# Patient Record
Sex: FEMALE | Employment: UNEMPLOYED | URBAN - METROPOLITAN AREA
[De-identification: names, ages, dates, MRNs, and addresses within clinical notes are randomized per-mention and may not be internally consistent; named-entity substitution may affect disease eponyms.]

---

## 2017-02-06 ENCOUNTER — OFFICE VISIT (OUTPATIENT)
Dept: FAMILY MEDICINE | Facility: CLINIC | Age: 62
End: 2017-02-06
Payer: COMMERCIAL

## 2017-02-06 VITALS
WEIGHT: 168 LBS | HEART RATE: 70 BPM | OXYGEN SATURATION: 98 % | TEMPERATURE: 97.6 F | DIASTOLIC BLOOD PRESSURE: 79 MMHG | BODY MASS INDEX: 29.77 KG/M2 | SYSTOLIC BLOOD PRESSURE: 137 MMHG | HEIGHT: 63 IN

## 2017-02-06 DIAGNOSIS — Z12.11 SCREEN FOR COLON CANCER: ICD-10-CM

## 2017-02-06 DIAGNOSIS — M17.0 PRIMARY OSTEOARTHRITIS OF BOTH KNEES: ICD-10-CM

## 2017-02-06 DIAGNOSIS — I10 ESSENTIAL HYPERTENSION WITH GOAL BLOOD PRESSURE LESS THAN 140/90: Primary | ICD-10-CM

## 2017-02-06 DIAGNOSIS — R53.83 FATIGUE, UNSPECIFIED TYPE: ICD-10-CM

## 2017-02-06 DIAGNOSIS — Z11.59 NEED FOR HEPATITIS C SCREENING TEST: ICD-10-CM

## 2017-02-06 LAB
ALBUMIN SERPL-MCNC: 4 G/DL (ref 3.4–5)
ALP SERPL-CCNC: 68 U/L (ref 40–150)
ALT SERPL W P-5'-P-CCNC: 30 U/L (ref 0–50)
ANION GAP SERPL CALCULATED.3IONS-SCNC: 11 MMOL/L (ref 3–14)
AST SERPL W P-5'-P-CCNC: 29 U/L (ref 0–45)
BASOPHILS # BLD AUTO: 0 10E9/L (ref 0–0.2)
BASOPHILS NFR BLD AUTO: 0.3 %
BILIRUB SERPL-MCNC: 0.5 MG/DL (ref 0.2–1.3)
BUN SERPL-MCNC: 12 MG/DL (ref 7–30)
CALCIUM SERPL-MCNC: 9.6 MG/DL (ref 8.5–10.1)
CHLORIDE SERPL-SCNC: 101 MMOL/L (ref 94–109)
CO2 SERPL-SCNC: 27 MMOL/L (ref 20–32)
CREAT SERPL-MCNC: 0.66 MG/DL (ref 0.52–1.04)
DIFFERENTIAL METHOD BLD: NORMAL
EOSINOPHIL # BLD AUTO: 0.2 10E9/L (ref 0–0.7)
EOSINOPHIL NFR BLD AUTO: 1.6 %
ERYTHROCYTE [DISTWIDTH] IN BLOOD BY AUTOMATED COUNT: 14.9 % (ref 10–15)
GFR SERPL CREATININE-BSD FRML MDRD: NORMAL ML/MIN/1.7M2
GLUCOSE SERPL-MCNC: 88 MG/DL (ref 70–99)
HBA1C MFR BLD: 5.7 % (ref 4.3–6)
HCT VFR BLD AUTO: 42 % (ref 35–47)
HGB BLD-MCNC: 13.8 G/DL (ref 11.7–15.7)
LYMPHOCYTES # BLD AUTO: 3.1 10E9/L (ref 0.8–5.3)
LYMPHOCYTES NFR BLD AUTO: 33.8 %
MCH RBC QN AUTO: 28.3 PG (ref 26.5–33)
MCHC RBC AUTO-ENTMCNC: 32.9 G/DL (ref 31.5–36.5)
MCV RBC AUTO: 86 FL (ref 78–100)
MONOCYTES # BLD AUTO: 0.9 10E9/L (ref 0–1.3)
MONOCYTES NFR BLD AUTO: 9.7 %
NEUTROPHILS # BLD AUTO: 5 10E9/L (ref 1.6–8.3)
NEUTROPHILS NFR BLD AUTO: 54.6 %
PLATELET # BLD AUTO: 360 10E9/L (ref 150–450)
POTASSIUM SERPL-SCNC: 3.5 MMOL/L (ref 3.4–5.3)
PROT SERPL-MCNC: 7.9 G/DL (ref 6.8–8.8)
RBC # BLD AUTO: 4.87 10E12/L (ref 3.8–5.2)
SODIUM SERPL-SCNC: 139 MMOL/L (ref 133–144)
TSH SERPL DL<=0.005 MIU/L-ACNC: 1.35 MU/L (ref 0.4–4)
WBC # BLD AUTO: 9.1 10E9/L (ref 4–11)

## 2017-02-06 PROCEDURE — 83036 HEMOGLOBIN GLYCOSYLATED A1C: CPT | Performed by: FAMILY MEDICINE

## 2017-02-06 PROCEDURE — 82306 VITAMIN D 25 HYDROXY: CPT | Performed by: FAMILY MEDICINE

## 2017-02-06 PROCEDURE — 86803 HEPATITIS C AB TEST: CPT | Performed by: FAMILY MEDICINE

## 2017-02-06 PROCEDURE — 36415 COLL VENOUS BLD VENIPUNCTURE: CPT | Performed by: FAMILY MEDICINE

## 2017-02-06 PROCEDURE — 80050 GENERAL HEALTH PANEL: CPT | Performed by: FAMILY MEDICINE

## 2017-02-06 PROCEDURE — 99214 OFFICE O/P EST MOD 30 MIN: CPT | Performed by: FAMILY MEDICINE

## 2017-02-06 RX ORDER — ACETAMINOPHEN 325 MG/1
325-650 TABLET ORAL EVERY 6 HOURS PRN
Qty: 100 TABLET | Refills: 3 | Status: SHIPPED | OUTPATIENT
Start: 2017-02-06 | End: 2017-11-10

## 2017-02-06 NOTE — Clinical Note
[facility]   [address] [place]  [address]  [phone]                                   February 8, 2017    Sanjuana Salamanca  [address]    [facility] [place] [address]        Dear Sanjuana,    Your recent labs look normal.     Results for orders placed or performed in visit on 02/06/17   Hepatitis C Screen Reflex to HCV RNA Quant and Genotype   Result Value Ref Range    Hepatitis C Antibody  NR     Nonreactive   Assay performance characteristics have not been established for newborns,   infants, and children     CBC with platelets and differential   Result Value Ref Range    WBC 9.1 4.0 - 11.0 10e9/L    RBC Count 4.87 3.8 - 5.2 10e12/L    Hemoglobin 13.8 11.7 - 15.7 g/dL    Hematocrit 42.0 35.0 - 47.0 %    MCV 86 78 - 100 fl    MCH 28.3 26.5 - 33.0 pg    MCHC 32.9 31.5 - 36.5 g/dL    RDW 14.9 10.0 - 15.0 %    Platelet Count 360 150 - 450 10e9/L    Diff Method Automated Method     % Neutrophils 54.6 %    % Lymphocytes 33.8 %    % Monocytes 9.7 %    % Eosinophils 1.6 %    % Basophils 0.3 %    Absolute Neutrophil 5.0 1.6 - 8.3 10e9/L    Absolute Lymphocytes 3.1 0.8 - 5.3 10e9/L    Absolute Monocytes 0.9 0.0 - 1.3 10e9/L    Absolute Eosinophils 0.2 0.0 - 0.7 10e9/L    Absolute Basophils 0.0 0.0 - 0.2 10e9/L   Comprehensive metabolic panel (BMP + Alb, Alk Phos, ALT, AST, Total. Bili, TP)   Result Value Ref Range    Sodium 139 133 - 144 mmol/L    Potassium 3.5 3.4 - 5.3 mmol/L    Chloride 101 94 - 109 mmol/L    Carbon Dioxide 27 20 - 32 mmol/L    Anion Gap 11 3 - 14 mmol/L    Glucose 88 70 - 99 mg/dL    Urea Nitrogen 12 7 - 30 mg/dL    Creatinine 0.66 0.52 - 1.04 mg/dL    GFR Estimate >90  Non  GFR Calc   >60 mL/min/1.7m2    GFR Estimate If Black >90   GFR Calc   >60 mL/min/1.7m2    Calcium 9.6 8.5 - 10.1 mg/dL    Bilirubin Total 0.5 0.2 - 1.3 mg/dL    Albumin 4.0 3.4 - 5.0 g/dL    Protein Total 7.9 6.8 - 8.8 g/dL    Alkaline Phosphatase 68  40 - 150 U/L    ALT 30 0 - 50 U/L    AST 29 0 - 45 U/L   TSH with free T4 reflex   Result Value Ref Range    TSH 1.35 0.40 - 4.00 mU/L   Hemoglobin A1c   Result Value Ref Range    Hemoglobin A1C 5.7 4.3 - 6.0 %   Vitamin D Deficiency   Result Value Ref Range    Vitamin D Deficiency screening 27 20 - 75 ug/L       If you have any questions please call the clinic at [phone]    Sincerely,    Sweetie Gao MD  bmd

## 2017-02-06 NOTE — NURSING NOTE
"Chief Complaint   Patient presents with     Hypertension     and potassium, and very tired        Initial /79 mmHg  Pulse 70  Temp(Src) 97.6  F (36.4  C) (Oral)  Ht 5' 3.25\" (1.607 m)  Wt 168 lb (76.204 kg)  BMI 29.51 kg/m2  SpO2 98% Estimated body mass index is 29.51 kg/(m^2) as calculated from the following:    Height as of this encounter: 5' 3.25\" (1.607 m).    Weight as of this encounter: 168 lb (76.204 kg).  Medication Reconciliation: complete  Fede Castellanos MA    "

## 2017-02-06 NOTE — PROGRESS NOTES
"  SUBJECTIVE:                                                    Sanjuana Salamanca is a 62 year old female who presents to clinic today for the following health issues:    Hypertension Follow-up      Outpatient blood pressures are being checked at home.     Low Salt Diet: no added salt       Amount of exercise or physical activity: None    Problems taking medications regularly: No    Medication side effects: none  Diet: low salt and low fat/cholesterol    Check potassium and patient is very tired.    She could not get her labs done during her last check.     Problem list and histories reviewed & adjusted, as indicated.  Additional history: as documented    BP Readings from Last 3 Encounters:   02/06/17 137/79   10/07/16 141/86   08/26/16 105/74    Wt Readings from Last 3 Encounters:   02/06/17 168 lb (76.204 kg)   10/07/16 169 lb (76.658 kg)   08/26/16 169 lb (76.658 kg)             Problem list, Medication list, Allergies, and Medical/Social/Surgical histories reviewed in EPIC and updated as appropriate.    ROS:  Constitutional, HEENT, cardiovascular, pulmonary, gi and gu systems are negative, except as otherwise noted.    OBJECTIVE:                                                    /79 mmHg  Pulse 70  Temp(Src) 97.6  F (36.4  C) (Oral)  Ht 5' 3.25\" (1.607 m)  Wt 168 lb (76.204 kg)  BMI 29.51 kg/m2  SpO2 98%  Body mass index is 29.51 kg/(m^2).  GENERAL: healthy, alert and no distress  HENT: ear canals and TM's normal, nose and mouth without ulcers or lesions  NECK: no adenopathy, no asymmetry, masses, or scars and thyroid normal to palpation  RESP: lungs clear to auscultation - no rales, rhonchi or wheezes  CV: regular rate and rhythm, normal S1 S2, no S3 or S4, no murmur, click or rub, no peripheral edema and peripheral pulses strong  ABDOMEN: soft, nontender, no hepatosplenomegaly, no masses and bowel sounds normal  MS: no gross musculoskeletal defects noted, no edema  NEURO: Normal strength and tone, " mentation intact and speech normal       ASSESSMENT/PLAN:                                                        ICD-10-CM    1. Essential hypertension with goal blood pressure less than 140/90 I10    2. Screen for colon cancer Z12.11 Fecal colorectal cancer screen (FIT)   3. Need for hepatitis C screening test Z11.59 Hepatitis C Screen Reflex to HCV RNA Quant and Genotype   4. Fatigue, unspecified type R53.83 CBC with platelets and differential     Comprehensive metabolic panel (BMP + Alb, Alk Phos, ALT, AST, Total. Bili, TP)     TSH with free T4 reflex     Hemoglobin A1c     Vitamin D Deficiency   5. Primary osteoarthritis of both knees M17.0 acetaminophen (TYLENOL) 325 MG tablet     Pt's exam is reassuring. Labs ordered for fatigue. Encouraged regular exercise, healthy diet and vitamin D daily.     Pt's BP is at goal today. Continue with same meds for HTN w/p change. F/u in 6 months for hypertension.       Sweetie Gao MD  [facility]

## 2017-02-07 LAB
DEPRECATED CALCIDIOL+CALCIFEROL SERPL-MC: 27 UG/L (ref 20–75)
HCV AB SERPL QL IA: NORMAL

## 2017-02-08 NOTE — PROGRESS NOTES
Quick Note:    Dear Sanjuana Salamanca ,     Your recent labs look normal.     Sweetie Gao MD.   Family Physician.  [facility].         ______

## 2017-02-10 DIAGNOSIS — Z12.11 SCREEN FOR COLON CANCER: ICD-10-CM

## 2017-02-10 PROCEDURE — 82274 ASSAY TEST FOR BLOOD FECAL: CPT | Performed by: FAMILY MEDICINE

## 2017-02-12 LAB — HEMOCCULT STL QL IA: NEGATIVE

## 2017-02-13 NOTE — PROGRESS NOTES
Dear Sanjuana Salamanca,     Your recent stool test is NEGATIVE.     Sweetie Gao MD.   Family Physician.  [facility].

## 2017-04-06 ENCOUNTER — TELEPHONE (OUTPATIENT)
Dept: FAMILY MEDICINE | Facility: CLINIC | Age: 62
End: 2017-04-06

## 2017-04-06 NOTE — TELEPHONE ENCOUNTER
Panel Management Review      Patient has the following on her problem list:     Hypertension   Last three blood pressure readings:  BP Readings from Last 3 Encounters:   02/06/17 137/79   10/07/16 141/86   08/26/16 105/74     Blood pressure: Passed    HTN Guidelines:  Age 18-59 BP range:  Less than 140/90  Age 60-85 with Diabetes:  Less than 140/90  Age 60-85 without Diabetes:  less than 150/90      Composite cancer screening  Chart review shows that this patient is due/due soon for the following Pap Smear and Mammogram  Summary:    Patient is due/failing the following:   MAMMOGRAM and PAP    Action needed:     PLAN:  Due for pap smear and mammogram,but declined   Follow up needed: Do not contact patient    Type of Follow up: none  Per PM on 10/1/15, 8/11/16 PM patient declined to complete pap    Type of outreach:    None    Questions for provider review:    None                                                                                                                                    Mari Allan [facility]        Chart routed to Care Team .

## 2017-04-28 ENCOUNTER — OFFICE VISIT (OUTPATIENT)
Dept: FAMILY MEDICINE | Facility: CLINIC | Age: 62
End: 2017-04-28
Payer: COMMERCIAL

## 2017-04-28 VITALS
DIASTOLIC BLOOD PRESSURE: 84 MMHG | SYSTOLIC BLOOD PRESSURE: 121 MMHG | BODY MASS INDEX: 30.4 KG/M2 | TEMPERATURE: 98.2 F | WEIGHT: 173 LBS | OXYGEN SATURATION: 98 % | HEART RATE: 58 BPM

## 2017-04-28 DIAGNOSIS — R05.9 COUGH: Primary | ICD-10-CM

## 2017-04-28 DIAGNOSIS — I10 ESSENTIAL HYPERTENSION WITH GOAL BLOOD PRESSURE LESS THAN 140/90: ICD-10-CM

## 2017-04-28 DIAGNOSIS — E87.6 HYPOKALEMIA: ICD-10-CM

## 2017-04-28 DIAGNOSIS — I10 HYPERTENSION GOAL BP (BLOOD PRESSURE) < 140/90: ICD-10-CM

## 2017-04-28 PROCEDURE — 99213 OFFICE O/P EST LOW 20 MIN: CPT | Performed by: FAMILY MEDICINE

## 2017-04-28 RX ORDER — METOPROLOL TARTRATE 25 MG/1
12.5 TABLET, FILM COATED ORAL 2 TIMES DAILY
Qty: 90 TABLET | Refills: 1 | Status: SHIPPED | OUTPATIENT
Start: 2017-04-28 | End: 2017-11-10

## 2017-04-28 RX ORDER — GUAIFENESIN/DEXTROMETHORPHAN 100-10MG/5
5 SYRUP ORAL EVERY 4 HOURS PRN
Qty: 560 ML | Refills: 0 | Status: SHIPPED | OUTPATIENT
Start: 2017-04-28 | End: 2017-05-17

## 2017-04-28 NOTE — MR AVS SNAPSHOT
"              After Visit Summary   2017    Sanjuana Salamanca    MRN: [id number]           Patient Information     Date Of Birth          1955        Visit Information        Provider Department      2017 1:20 PM Sweetie Gao MD [facility]        Today's Diagnoses     Cough    -  1    Essential hypertension with goal blood pressure less than 140/90        Hypokalemia        Hypertension goal BP (blood pressure) < 140/90           Follow-ups after your visit        Who to contact     If you have questions or need follow up information about today's clinic visit or your schedule please contact [facility] directly at [phone].  Normal or non-critical lab and imaging results will be communicated to you by MyChart, letter or phone within 4 business days after the clinic has received the results. If you do not hear from us within 7 days, please contact the clinic through MyChart or phone. If you have a critical or abnormal lab result, we will notify you by phone as soon as possible.  Submit refill requests through [employer] or call your pharmacy and they will forward the refill request to us. Please allow 3 business days for your refill to be completed.          Additional Information About Your Visit        MyChart Information     [employer] lets you send messages to your doctor, view your test results, renew your prescriptions, schedule appointments and more. To sign up, go to [email or web address] . Click on \"Log in\" on the left side of the screen, which will take you to the Welcome page. Then click on \"Sign up Now\" on the right side of the page.     You will be asked to enter the access code listed below, as well as some personal information. Please follow the directions to create your username and password.     Your access code is: [email or web address]  Expires: 2017  2:09 PM     Your access code will  in 90 days. If you need help or a new code, " please call your [place] clinic or [phone].        Care EveryWhere ID     This is your Care EveryWhere ID. This could be used by other organizations to access your [place] medical records  [id number]        Your Vitals Were     Pulse Temperature Pulse Oximetry BMI (Body Mass Index)          58 98.2  F (36.8  C) (Oral) 98% 30.4 kg/m2         Blood Pressure from Last 3 Encounters:   04/28/17 121/84   02/06/17 137/79   10/07/16 141/86    Weight from Last 3 Encounters:   04/28/17 173 lb (78.5 kg)   02/06/17 168 lb (76.2 kg)   10/07/16 169 lb (76.7 kg)              Today, you had the following     No orders found for display         Today's Medication Changes          These changes are accurate as of: 4/28/17  2:09 PM.  If you have any questions, ask your nurse or doctor.               Start taking these medicines.        Dose/Directions    guaiFENesin-dextromethorphan 100-10 MG/5ML syrup   Commonly known as:  ROBITUSSIN DM   Used for:  Cough   Started by:  Sweetie Gao MD        Dose:  5 mL   Take 5 mLs by mouth every 4 hours as needed for cough   Quantity:  560 mL   Refills:  0            Where to get your medicines      These medications were sent to [facility] [address] [facility] [address] AT [facility] [address]  [address] [facility] [address]     Phone:  [phone]     guaiFENesin-dextromethorphan 100-10 MG/5ML syrup    metoprolol 25 MG tablet                Primary Care Provider Office Phone # Fax #    Sweetie Gao [phone]       [facility] [address] [facility] [address]        Thank you!     Thank you for choosing [facility]  for your care. Our goal is always to provide you with excellent care. Hearing back from our patients is one way we can continue to improve our services. Please take a few minutes to complete the written survey that you may receive in the mail after  your visit with us. Thank you!             Your Updated Medication List - Protect others around you: Learn how to safely use, store and throw away your medicines at www.disposemymeds.org.          This list is accurate as of: 4/28/17  2:09 PM.  Always use your most recent med list.                   Brand Name Dispense Instructions for use    acetaminophen 325 MG tablet    TYLENOL    100 tablet    Take 1-2 tablets (325-650 mg) by mouth every 6 hours as needed for mild pain       diclofenac 50 MG EC tablet    VOLTAREN    90 tablet    Take 1 tablet (50 mg) by mouth 3 times daily as needed for moderate pain       guaiFENesin-dextromethorphan 100-10 MG/5ML syrup    ROBITUSSIN DM    560 mL    Take 5 mLs by mouth every 4 hours as needed for cough       metoprolol 25 MG tablet    LOPRESSOR    90 tablet    Take 0.5 tablets (12.5 mg) by mouth 2 times daily       omeprazole 20 MG tablet     180 tablet    Take 2 tablets (40 mg) by mouth daily Take 30-60 minutes before a meal.       polyethylene glycol powder    MIRALAX    510 g    Take 17 g (1 capful) by mouth daily

## 2017-04-28 NOTE — NURSING NOTE
"Chief Complaint   Patient presents with     Cough     x 2 weeks      Hypertension       Initial /84  Pulse 58  Temp 98.2  F (36.8  C) (Oral)  Wt 173 lb (78.5 kg)  SpO2 98%  BMI 30.4 kg/m2 Estimated body mass index is 30.4 kg/(m^2) as calculated from the following:    Height as of 2/6/17: 5' 3.25\" (1.607 m).    Weight as of this encounter: 173 lb (78.5 kg).  Medication Reconciliation: complete  Fede Castellanos MA    "

## 2017-04-28 NOTE — PROGRESS NOTES
SUBJECTIVE:                                                    Sanjuana Salamanca is a 62 year old female who presents to clinic today for the following health issues:     Hypertension Follow-up      Outpatient blood pressures are being checked at home     Low Salt Diet: no added salt       Amount of exercise or physical activity: None    Problems taking medications regularly: No    Medication side effects: none    Diet: low salt  Doing well.       ENT Symptoms             Symptoms: cc Present Absent Comment   Fever/Chills   x    Fatigue   x    Muscle Aches   x    Eye Irritation   x    Sneezing   x    Nasal Nicholas/Drg   x    Sinus Pressure/Pain   x    Loss of smell   x    Dental pain   x    Sore Throat   x    Swollen Glands   x    Ear Pain/Fullness   x    Cough  x     Wheeze   x    Chest Pain   x    Shortness of breath   x    Rash   x    Other         Symptom duration:  2 weeks    Symptom severity:  moderate    Treatments tried:  robitussin    Contacts:  none        She was visiting her mother in [place]. 2 weeks ago she developed cough . Had azithromycin prescription from her last office visit that she filled out and completed the antibiotic course. Cough improved. She took her daughter's cough syrup just once thathelped her sleep.     Her cough is much better todat. She wants to know if she needs stronger cough medicine or not.     Problem list and histories reviewed & adjusted, as indicated.  Additional history: as documented    Patient Active Problem List   Diagnosis     Avitaminosis D     Esophageal reflux     OA (osteoarthritis) of knee     Advanced directives, counseling/discussion     Hyperlipidemia LDL goal <160     Cataracts, both eyes     Dry eyes     Hypokalemia     Prediabetes     Essential hypertension with goal blood pressure less than 140/90     History reviewed. No pertinent surgical history.    Social History   Substance Use Topics     Smoking status: Never Smoker     Smokeless tobacco: Never Used      Alcohol use No     Family History   Problem Relation Age of Onset     Hypertension Mother      Thyroid Disease Mother      DIABETES Other      CEREBROVASCULAR DISEASE Other      Glaucoma No family hx of      Macular Degeneration No family hx of      CANCER No family hx of          BP Readings from Last 3 Encounters:   04/28/17 121/84   02/06/17 137/79   10/07/16 141/86    Wt Readings from Last 3 Encounters:   04/28/17 173 lb (78.5 kg)   02/06/17 168 lb (76.2 kg)   10/07/16 169 lb (76.7 kg)                    Reviewed and updated as needed this visit by clinical staff  Tobacco  Allergies  Meds  Med Hx  Surg Hx  Fam Hx  Soc Hx      Reviewed and updated as needed this visit by Provider         ROS:  Constitutional, HEENT, cardiovascular, pulmonary, gi and gu systems are negative, except as otherwise noted.    OBJECTIVE:                                                    /84  Pulse 58  Temp 98.2  F (36.8  C) (Oral)  Wt 173 lb (78.5 kg)  SpO2 98%  BMI 30.4 kg/m2  Body mass index is 30.4 kg/(m^2).  GENERAL: healthy, alert and no distress  HENT: ear canals and TM's normal, nose and mouth without ulcers or lesions  SINUSES: no sinus tenderness.   NECK: no adenopathy, no asymmetry, masses, or scars and thyroid normal to palpation  RESP: lungs clear to auscultation - no rales, rhonchi or wheezes  CV: regular rate and rhythm, normal S1 S2, no S3 or S4, no murmur, click or rub, no peripheral edema and peripheral pulses strong  MS: no gross musculoskeletal defects noted, no edema  NEURO: Normal strength and tone, mentation intact and speech normal     ASSESSMENT/PLAN:                                                        ICD-10-CM    1. Cough R05 guaiFENesin-dextromethorphan (ROBITUSSIN DM) 100-10 MG/5ML syrup   2. Essential hypertension with goal blood pressure less than 140/90 I10    3. Hypokalemia E87.6    4. Hypertension goal BP (blood pressure) < 140/90 I10 metoprolol (LOPRESSOR) 25 MG tablet     Pt had  robitussin with codeine from her daughter's prescription. She wanted cough medicine that she can take every 6 hrs w/o making her sleepy. Rx as above.   F/u as needed.     BP at goal, continue metoprolol.     Sweetie Gao MD  [facility]

## 2017-05-04 ENCOUNTER — TELEPHONE (OUTPATIENT)
Dept: FAMILY MEDICINE | Facility: CLINIC | Age: 62
End: 2017-05-04

## 2017-05-04 NOTE — TELEPHONE ENCOUNTER
Reason for Call:  Form, our goal is to have forms completed with 72 hours, however, some forms may require a visit or additional information.    Type of letter, form or note:  medical    Who is the form from?: Patient    Where did the form come from: Patient or family brought in       What clinic location was the form placed at?: [place] ()    Where the form was placed: 's Box    What number is listed as a contact on the form?: [phone]        Additional comments: Please fax to [phone]    Call taken on 5/4/2017 at 3:49 PM by Bettie Dougherty

## 2017-05-05 NOTE — TELEPHONE ENCOUNTER
Date forms received: 05/05/2017  Form completed as much as possible by Sybil Tong.  Forms placed: in providers folder Date placed: 05/05/2017  Sybil Tong

## 2017-05-11 ENCOUNTER — TELEPHONE (OUTPATIENT)
Dept: FAMILY MEDICINE | Facility: CLINIC | Age: 62
End: 2017-05-11

## 2017-05-11 NOTE — TELEPHONE ENCOUNTER
5/11/2017    Call Regarding Preventive Health Screening Colonoscopy    Attempt 1    Message with female    Comments:             Outreach   LYLA

## 2017-05-17 ENCOUNTER — OFFICE VISIT (OUTPATIENT)
Dept: FAMILY MEDICINE | Facility: CLINIC | Age: 62
End: 2017-05-17
Payer: COMMERCIAL

## 2017-05-17 VITALS
WEIGHT: 171 LBS | HEART RATE: 118 BPM | BODY MASS INDEX: 30.3 KG/M2 | TEMPERATURE: 99.4 F | DIASTOLIC BLOOD PRESSURE: 78 MMHG | HEIGHT: 63 IN | OXYGEN SATURATION: 96 % | SYSTOLIC BLOOD PRESSURE: 138 MMHG

## 2017-05-17 DIAGNOSIS — J98.01 ACUTE BRONCHOSPASM: Primary | ICD-10-CM

## 2017-05-17 PROCEDURE — 99213 OFFICE O/P EST LOW 20 MIN: CPT | Performed by: FAMILY MEDICINE

## 2017-05-17 RX ORDER — ALBUTEROL SULFATE 90 UG/1
AEROSOL, METERED RESPIRATORY (INHALATION)
Qty: 1 INHALER | Refills: 0 | Status: SHIPPED | OUTPATIENT
Start: 2017-05-17 | End: 2017-08-25

## 2017-05-17 NOTE — PATIENT INSTRUCTIONS
[facility]    If you have any questions regarding to your visit please contact your care team:       Team Purple:   Clinic Hours Telephone Number   [address] Dr[address]   7am-7pm  Monday - Thursday   7am-5pm  Fridays  [phone]  (Appointment scheduling available 24/7)    Questions about your Visit?   Team Line:  [phone]   Urgent Care - [place] and [facility] - 11am-9pm Monday-Friday Saturday-Sunday- 9am-5pm   [place] - 5pm-9pm Monday-Friday Saturday-Sunday- 9am-5pm  [phone] - [facility]  [phone] - [place]       What options do I have for visits at the clinic other than the traditional office visit?  To expand how we care for you, many of our providers are utilizing electronic visits (e-visits) and telephone visits, when medically appropriate, for interactions with their patients rather than a visit in the clinic.   We also offer nurse visits for many medical concerns. Just like any other service, we will bill your insurance company for this type of visit based on time spent on the phone with your provider. Not all insurance companies cover these visits. Please check with your medical insurance if this type of visit is covered. You will be responsible for any charges that are not paid by your insurance.      E-visits via [employer]:  generally incur a $35.00 fee.  Telephone visits:  Time spent on the phone: *charged based on time that is spent on the phone in increments of 10 minutes. Estimated cost:   5-10 mins $30.00   11-20 mins. $59.00   21-30 mins. $85.00     Use [employer] (secure email communication and access to your chart) to send your primary care provider a message or make an appointment. Ask someone on your Team how to sign up for [employer].  For a Price Quote for your services, please call our Consumer Price Line at [phone].  As always, Thank you for trusting us with your health care needs!

## 2017-05-17 NOTE — PROGRESS NOTES
SUBJECTIVE:                                                    Sanjuana Salamanca is a 62 year old female who presents to clinic today for the following health issues:      ENT Symptoms             Symptoms: cc Present Absent Comment   Fever/Chills   x    Fatigue  x     Muscle Aches   x    Eye Irritation   x    Sneezing   x    Nasal [place]colas/Drg   x    Sinus Pressure/Pain   x    Loss of smell   x    Dental pain   x    Sore Throat   x    Swollen Glands   x    Ear Pain/Fullness   x    Cough  x     Wheeze   x    Chest Pain   x    Shortness of breath   x    Rash       Other         Symptom duration:  1 month   Symptom severity:  severe   Treatments tried:  robutussin,    Contacts:  no              Problem list and histories reviewed & adjusted, as indicated.  Additional history: as documented    Patient Active Problem List   Diagnosis     Avitaminosis D     Esophageal reflux     OA (osteoarthritis) of knee     Advanced directives, counseling/discussion     Hyperlipidemia LDL goal <160     Cataracts, both eyes     Dry eyes     Hypokalemia     Prediabetes     Essential hypertension with goal blood pressure less than 140/90     History reviewed. No pertinent surgical history.    Social History   Substance Use Topics     Smoking status: Never Smoker     Smokeless tobacco: Never Used     Alcohol use No     Family History   Problem Relation Age of Onset     Hypertension Mother      Thyroid Disease Mother      DIABETES Other      CEREBROVASCULAR DISEASE Other      Glaucoma No family hx of      Macular Degeneration No family hx of      CANCER No family hx of          Current Outpatient Prescriptions   Medication Sig Dispense Refill     albuterol (PROAIR HFA/PROVENTIL HFA/VENTOLIN HFA) 108 (90 BASE) MCG/ACT Inhaler Inhale 2 puffs every 6 hours as needed for cough 1 Inhaler 0     metoprolol (LOPRESSOR) 25 MG tablet Take 0.5 tablets (12.5 mg) by mouth 2 times daily 90 tablet 1     acetaminophen (TYLENOL) 325 MG tablet Take 1-2 tablets  "(325-650 mg) by mouth every 6 hours as needed for mild pain 100 tablet 3     diclofenac (VOLTAREN) 50 MG EC tablet Take 1 tablet (50 mg) by mouth 3 times daily as needed for moderate pain 90 tablet 1     omeprazole 20 MG tablet Take 2 tablets (40 mg) by mouth daily Take 30-60 minutes before a meal. 180 tablet 1     polyethylene glycol (MIRALAX) powder Take 17 g (1 capful) by mouth daily 510 g 1     BP Readings from Last 3 Encounters:   05/17/17 138/78   04/28/17 121/84   02/06/17 137/79    Wt Readings from Last 3 Encounters:   05/17/17 171 lb (77.6 kg)   04/28/17 173 lb (78.5 kg)   02/06/17 168 lb (76.2 kg)                  Labs reviewed in EPIC    Reviewed and updated as needed this visit by clinical staff  Tobacco  Allergies  Meds  Med Hx  Surg Hx  Fam Hx  Soc Hx      Reviewed and updated as needed this visit by Provider         ROS:  This 62 year old female is here today because she has had a cough for over a month. She was visiting in [place] and had a left over z-pack so she took that mid April. She was seen in clinic 2/28/17 for the persistae cough. She had used her daughter's robitussin AC and it seemed to help so she was given her own prescription at that time. Now the robitussin AC is gone and she is still coughing. She has never smoked, but she admits to breathing in a lot of incense on a daily basis. She says her culture believes that is healthy for her body, skin and hair. All other review of systems are negative  Personal, family, and social history reviewed with patient and revised.         OBJECTIVE:                                                    /78 (BP Location: Right arm, Patient Position: Chair, Cuff Size: Adult Large)  Pulse 118  Temp 99.4  F (37.4  C)  Ht 5' 3.25\" (1.607 m)  Wt 171 lb (77.6 kg)  SpO2 96%  BMI 30.05 kg/m2  Body mass index is 30.05 kg/(m^2).  GENERAL: healthy, alert and no distress  NECK: no adenopathy, no asymmetry, masses, or scars and thyroid normal to " palpation  RESP: lungs clear to auscultation - no rales or rhonchi, but she does have end expiratory wheezes and an occasional dry wheezy cough.   CV: regular rate and rhythm, normal S1 S2, no S3 or S4, no murmur, click or rub, no peripheral edema   MS: no gross musculoskeletal defects noted, no edema    Diagnostic Test Results:  none      ASSESSMENT/PLAN:                                                             1. Acute bronchospasm  As above, she must stop exposing herself to the incense which is irritating to her lungs   - albuterol (PROAIR HFA/PROVENTIL HFA/VENTOLIN HFA) 108 (90 BASE) MCG/ACT Inhaler; Inhale 2 puffs every 6 hours as needed for cough  Dispense: 1 Inhaler; Refill: 0  No need for refill of robitussin AC  Return to clinic if no improvement     GINNY GRECO MD  [facility]

## 2017-05-17 NOTE — MR AVS SNAPSHOT
After Visit Summary   5/17/2017    Sanjuana Salamanca    MRN: [id number]           Patient Information     Date Of Birth          1955        Visit Information        Provider Department      5/17/2017 2:00 PM Ladonna Cohen MD [facility]        Today's Diagnoses     Acute bronchospasm    -  1      Care Instructions    [facility] ([facility]    If you have any questions regarding to your visit please contact your care team:       Team Purple:   Clinic Hours Telephone Number   [address] Dr.   7am-7pm  Monday - Thursday   7am-5pm  Fridays  [phone]  (Appointment scheduling available 24/7)    Questions about your Visit?   Team Line:  [phone]   Urgent Care - [place] [facility] - 11am-9pm Monday-Friday Saturday-Sunday- 9am-5pm   [place] - 5pm-9pm Monday-Friday Saturday-Sunday- 9am-5pm  [phone] - [place]   [phone] - [place]       What options do I have for visits at the clinic other than the traditional office visit?  To expand how we care for you, many of our providers are utilizing electronic visits (e-visits) and telephone visits, when medically appropriate, for interactions with their patients rather than a visit in the clinic.   We also offer nurse visits for many medical concerns. Just like any other service, we will bill your insurance company for this type of visit based on time spent on the phone with your provider. Not all insurance companies cover these visits. Please check with your medical insurance if this type of visit is covered. You will be responsible for any charges that are not paid by your insurance.      E-visits via [employer]:  generally incur a $35.00 fee.  Telephone visits:  Time spent on the phone: *charged based on time that is spent on the phone in increments of 10 minutes. Estimated cost:   5-10 mins $30.00   11-20 mins. $59.00   21-30 mins. $85.00  "    Use [employer] (secure email communication and access to your chart) to send your primary care provider a message or make an appointment. Ask someone on your Team how to sign up for [employer].  For a Price Quote for your services, please call our Consumer Price Line at [phone].  As always, Thank you for trusting us with your health care needs!            Follow-ups after your visit        Who to contact     If you have questions or need follow up information about today's clinic visit or your schedule please contact [facility] directly at [phone].  Normal or non-critical lab and imaging results will be communicated to you by MyChart, letter or phone within 4 business days after the clinic has received the results. If you do not hear from us within 7 days, please contact the clinic through [employer] or phone. If you have a critical or abnormal lab result, we will notify you by phone as soon as possible.  Submit refill requests through [employer] or call your pharmacy and they will forward the refill request to us. Please allow 3 business days for your refill to be completed.          Additional Information About Your Visit        MyChart Information     [employer] lets you send messages to your doctor, view your test results, renew your prescriptions, schedule appointments and more. To sign up, go to [email or web address] . Click on \"Log in\" on the left side of the screen, which will take you to the Welcome page. Then click on \"Sign up Now\" on the right side of the page.     You will be asked to enter the access code listed below, as well as some personal information. Please follow the directions to create your username and password.     Your access code is: [email or web address]  Expires: 2017  2:09 PM     Your access code will  in 90 days. If you need help or a new code, please call your [facility] or [phone].        Care EveryWhere ID     This is your Care EveryWhere ID. This could be " "used by other organizations to access your [facility] medical records  [id number]        Your Vitals Were     Pulse Temperature Height Pulse Oximetry BMI (Body Mass Index)       118 99.4  F (37.4  C) 5' 3.25\" (1.607 m) 96% 30.05 kg/m2        Blood Pressure from Last 3 Encounters:   05/17/17 138/78   04/28/17 121/84   02/06/17 137/79    Weight from Last 3 Encounters:   05/17/17 171 lb (77.6 kg)   04/28/17 173 lb (78.5 kg)   02/06/17 168 lb (76.2 kg)              Today, you had the following     No orders found for display         Today's Medication Changes          These changes are accurate as of: 5/17/17  2:19 PM.  If you have any questions, ask your nurse or doctor.               Start taking these medicines.        Dose/Directions    albuterol 108 (90 BASE) MCG/ACT Inhaler   Commonly known as:  PROAIR HFA/PROVENTIL HFA/VENTOLIN HFA   Used for:  Acute bronchospasm   Started by:  Ladonna Cohen MD        Inhale 2 puffs every 6 hours as needed for cough   Quantity:  1 Inhaler   Refills:  0            Where to get your medicines      These medications were sent to [facility] [address] [facility] [address] AT [facility] [address] [facility]  [address] [facility] [address]     Phone:  [phone]     albuterol 108 (90 BASE) MCG/ACT Inhaler                Primary Care Provider Office Phone # Fax #    Sweetie Brayden Gao [phone]       [facility] [address] [facility] [address]        Thank you!     Thank you for choosing [facility]  for your care. Our goal is always to provide you with excellent care. Hearing back from our patients is one way we can continue to improve our services. Please take a few minutes to complete the written survey that you may receive in the mail after your visit with us. Thank you!             Your Updated Medication List - Protect others around you: Learn how to safely use, store and throw away " your medicines at www.disposemymeds.org.          This list is accurate as of: 5/17/17  2:19 PM.  Always use your most recent med list.                   Brand Name Dispense Instructions for use    acetaminophen 325 MG tablet    TYLENOL    100 tablet    Take 1-2 tablets (325-650 mg) by mouth every 6 hours as needed for mild pain       albuterol 108 (90 BASE) MCG/ACT Inhaler    PROAIR HFA/PROVENTIL HFA/VENTOLIN HFA    1 Inhaler    Inhale 2 puffs every 6 hours as needed for cough       diclofenac 50 MG EC tablet    VOLTAREN    90 tablet    Take 1 tablet (50 mg) by mouth 3 times daily as needed for moderate pain       metoprolol 25 MG tablet    LOPRESSOR    90 tablet    Take 0.5 tablets (12.5 mg) by mouth 2 times daily       omeprazole 20 MG tablet     180 tablet    Take 2 tablets (40 mg) by mouth daily Take 30-60 minutes before a meal.       polyethylene glycol powder    MIRALAX    510 g    Take 17 g (1 capful) by mouth daily

## 2017-05-17 NOTE — NURSING NOTE
"Chief Complaint   Patient presents with     URI       Initial /78 (BP Location: Right arm, Patient Position: Chair, Cuff Size: Adult Large)  Pulse 118  Temp 99.4  F (37.4  C)  Ht 5' 3.25\" (1.607 m)  Wt 171 lb (77.6 kg)  SpO2 96%  BMI 30.05 kg/m2 Estimated body mass index is 30.05 kg/(m^2) as calculated from the following:    Height as of this encounter: 5' 3.25\" (1.607 m).    Weight as of this encounter: 171 lb (77.6 kg).  Medication Reconciliation: complete   Hyacinth Beyer MA      "

## 2017-07-01 DIAGNOSIS — E55.9 AVITAMINOSIS D: ICD-10-CM

## 2017-07-03 NOTE — TELEPHONE ENCOUNTER
Vitamin D (cholecalciferol) removed from med list at 4/28/17 office visit w/reason: therapy completed.  Phone call to patient to check if she is/is not taking this.  Left message for patient ([phone]) to call back to nurse line. Phone number provided.    Sunny Heard RN

## 2017-07-03 NOTE — TELEPHONE ENCOUNTER
cholecalciferol (VITAMIN D) 1000 UNIT tablet (Discontinued)      Last Written Prescription Date:  1/18/16  Last Fill Quantity: 100,   # refills: 3  Last Office Visit with [facility], [facility] or [facility] prescribing provider: 5/17/17  Future Office visit:       Routing refill request to provider for review/approval because:  Drug not active on patient's medication list

## 2017-07-06 NOTE — TELEPHONE ENCOUNTER
Called patient at [phone] (home) .Left message on voicemail to return phone call to triage.  Taylor Michaud RN C[facility] Triage.

## 2017-07-07 NOTE — TELEPHONE ENCOUNTER
Called patient at [phone] (home) .Left message to return phone call to triage.  Taylor Michaud RN CPC Triage.

## 2017-07-10 RX ORDER — CHOLECALCIFEROL (VITAMIN D3) 25 MCG
TABLET ORAL
Qty: 100 TABLET | Refills: 0 | Status: SHIPPED | OUTPATIENT
Start: 2017-07-10 | End: 2017-08-25

## 2017-07-10 NOTE — TELEPHONE ENCOUNTER
Recent vitamin D labs reviewed.   1000 IU of vitamin D daily is approved.     Sweetie Gao MD.   Family Physician.  [facility].

## 2017-07-10 NOTE — TELEPHONE ENCOUNTER
Routing refill request to provider for review/approval because:  Drug not active on patient's medication list  Has not responded to 3 attempts to reach her to ask if she is still taking this.     Eri Eden RN

## 2017-07-19 NOTE — TELEPHONE ENCOUNTER
7/19/2017    Call Regarding Preventive Health Screening Cervical/PAP    Attempt 2    Message on voicemail     Comments:       Outreach   wesley

## 2017-07-25 NOTE — TELEPHONE ENCOUNTER
7/25/2017    Call Regarding Preventive Health Screening Cervical/PAP    Attempt 3    Message on voicemail     Comments:       Outreach   TAINA

## 2017-08-01 DIAGNOSIS — M17.9 OSTEOARTHRITIS OF KNEE, UNSPECIFIED LATERALITY, UNSPECIFIED OSTEOARTHRITIS TYPE: ICD-10-CM

## 2017-08-01 NOTE — TELEPHONE ENCOUNTER
diclofenac (VOLTAREN) 50 MG EC tablet      Last Written Prescription Date: 12-13-16  Last Quantity: 90, # refills: 1  Last Office Visit with G, P or [facility] prescribing provider: 5-17-17       Creatinine   Date Value Ref Range Status   02/06/2017 0.66 0.52 - 1.04 mg/dL Final     Lab Results   Component Value Date    AST 29 02/06/2017     Lab Results   Component Value Date    ALT 30 02/06/2017     BP Readings from Last 3 Encounters:   05/17/17 138/78   04/28/17 121/84   02/06/17 137/79

## 2017-08-02 NOTE — TELEPHONE ENCOUNTER
Prescription approved per [facility] Refill Protocol.  Taylor Michaud, RN CPC Triage.

## 2017-08-14 ENCOUNTER — OFFICE VISIT (OUTPATIENT)
Dept: FAMILY MEDICINE | Facility: CLINIC | Age: 62
End: 2017-08-14
Payer: COMMERCIAL

## 2017-08-14 VITALS
DIASTOLIC BLOOD PRESSURE: 83 MMHG | WEIGHT: 169 LBS | BODY MASS INDEX: 29.7 KG/M2 | TEMPERATURE: 97.8 F | HEART RATE: 76 BPM | SYSTOLIC BLOOD PRESSURE: 137 MMHG

## 2017-08-14 DIAGNOSIS — I10 ESSENTIAL HYPERTENSION WITH GOAL BLOOD PRESSURE LESS THAN 140/90: Primary | ICD-10-CM

## 2017-08-14 DIAGNOSIS — K21.9 GASTROESOPHAGEAL REFLUX DISEASE, ESOPHAGITIS PRESENCE NOT SPECIFIED: ICD-10-CM

## 2017-08-14 DIAGNOSIS — E87.6 HYPOKALEMIA: ICD-10-CM

## 2017-08-14 DIAGNOSIS — R53.83 TIREDNESS: ICD-10-CM

## 2017-08-14 DIAGNOSIS — H54.7 REDUCED VISUAL ACUITY: ICD-10-CM

## 2017-08-14 DIAGNOSIS — E55.9 AVITAMINOSIS D: ICD-10-CM

## 2017-08-14 DIAGNOSIS — K59.09 OTHER CONSTIPATION: ICD-10-CM

## 2017-08-14 LAB
POTASSIUM SERPL-SCNC: 3.9 MMOL/L (ref 3.4–5.3)
VIT B12 SERPL-MCNC: 486 PG/ML (ref 193–986)

## 2017-08-14 PROCEDURE — 84132 ASSAY OF SERUM POTASSIUM: CPT | Performed by: FAMILY MEDICINE

## 2017-08-14 PROCEDURE — 99214 OFFICE O/P EST MOD 30 MIN: CPT | Performed by: FAMILY MEDICINE

## 2017-08-14 PROCEDURE — 82306 VITAMIN D 25 HYDROXY: CPT | Performed by: FAMILY MEDICINE

## 2017-08-14 PROCEDURE — 82607 VITAMIN B-12: CPT | Performed by: FAMILY MEDICINE

## 2017-08-14 PROCEDURE — 36415 COLL VENOUS BLD VENIPUNCTURE: CPT | Performed by: FAMILY MEDICINE

## 2017-08-14 RX ORDER — POLYETHYLENE GLYCOL 3350 17 G/17G
1 POWDER, FOR SOLUTION ORAL DAILY
Qty: 510 G | Refills: 1 | Status: SHIPPED | OUTPATIENT
Start: 2017-08-14 | End: 2017-08-25

## 2017-08-14 NOTE — PROGRESS NOTES
"  SUBJECTIVE:                                                    Sanjuana Salamanca is a 62 year old female who presents to clinic today for the following health issues:    Hypertension Follow-up    Outpatient blood pressures checking sometimes     Low Salt Diet: no added salt    Amount of exercise or physical activity: bike     Problems taking medications regularly: No    Medication side effects: none  Diet: low salt    She is not taking vitamin D for last few weeks.   Her diet is not as good, healthy.   Feels tired \" could not describe it well \"   \" I like to stay in bed, do not like to move.\"   Sometimes has HA.   Frontal HA. No double vision. No tingling, numbness or weakness in her hands and legs.     No chest pain, no cough, no SOB.   Has constipation. No n/v. No stomach pain.   Normal urination.   No joint pains, swelling , stiffness.     Exercise: very bad.   Mood symptoms: denies depression, feeling down.     Problem list and histories reviewed & adjusted, as indicated.  Additional history: as documented    Patient Active Problem List   Diagnosis     Avitaminosis D     Esophageal reflux     OA (osteoarthritis) of knee     Advanced directives, counseling/discussion     Hyperlipidemia LDL goal <160     Cataracts, both eyes     Dry eyes     Hypokalemia     Prediabetes     Essential hypertension with goal blood pressure less than 140/90     History reviewed. No pertinent surgical history.    Social History   Substance Use Topics     Smoking status: Never Smoker     Smokeless tobacco: Never Used     Alcohol use No     Family History   Problem Relation Age of Onset     Hypertension Mother      Thyroid Disease Mother      DIABETES Other      CEREBROVASCULAR DISEASE Other      Glaucoma No family hx of      Macular Degeneration No family hx of      CANCER No family hx of          Current Outpatient Prescriptions   Medication Sig Dispense Refill     diclofenac (VOLTAREN) 50 MG EC tablet TAKE 1 TABLET(50 MG) BY MOUTH THREE " TIMES DAILY AS NEEDED FOR MODERATE PAIN 90 tablet 0     albuterol (PROAIR HFA/PROVENTIL HFA/VENTOLIN HFA) 108 (90 BASE) MCG/ACT Inhaler Inhale 2 puffs every 6 hours as needed for cough 1 Inhaler 0     metoprolol (LOPRESSOR) 25 MG tablet Take 0.5 tablets (12.5 mg) by mouth 2 times daily 90 tablet 1     acetaminophen (TYLENOL) 325 MG tablet Take 1-2 tablets (325-650 mg) by mouth every 6 hours as needed for mild pain 100 tablet 3     omeprazole 20 MG tablet Take 2 tablets (40 mg) by mouth daily Take 30-60 minutes before a meal. 180 tablet 1     VITAMIN D3 1000 UNITS tablet TAKE 1 TABLET BY MOUTH EVERY DAY (Patient not taking: Reported on 8/14/2017) 100 tablet 0     polyethylene glycol (MIRALAX) powder Take 17 g (1 capful) by mouth daily (Patient not taking: Reported on 8/14/2017) 510 g 1     Recent Labs   Lab Test  02/06/17   1524  06/15/16   1447   01/15/16   1220   07/29/15   1231  09/19/14   1533  04/14/14   1555   A1C  5.7   --    --   6.0   --   6.2*   --    --    LDL   --    --    --    --    --   135*  138*  165*   HDL   --    --    --    --    --   32*  42*  34*   TRIG   --    --    --    --    --   220*  181*  137   ALT  30   --    --   32   --   28   --    --    CR  0.66   --    --   0.57   < >  0.48*  0.61   --    GFRESTIMATED  >90  Non  GFR Calc     --    --   >90  Non  GFR Calc     < >  >90  Non  GFR Calc    >90  Non  GFR Calc     --    GFRESTBLACK  >90   GFR Calc     --    --   >90   GFR Calc     < >  >90   GFR Calc    >90   GFR Calc     --    POTASSIUM  3.5  3.7   < >  3.7   < >  3.1*  3.7  3.6   TSH  1.35   --    --    --    --   1.72   --    --     < > = values in this interval not displayed.      BP Readings from Last 3 Encounters:   08/14/17 137/83   05/17/17 138/78   04/28/17 121/84    Wt Readings from Last 3 Encounters:   08/14/17 169 lb (76.7 kg)   05/17/17 171 lb (77.6  kg)   04/28/17 173 lb (78.5 kg)            Reviewed and updated as needed this visit by clinical staffTobacco  Allergies  Meds  Med Hx  Surg Hx  Fam Hx  Soc Hx      Reviewed and updated as needed this visit by Provider         ROS:  Constitutional, HEENT, cardiovascular, pulmonary, gi and gu systems are negative, except as otherwise noted.      OBJECTIVE:   /83  Pulse 76  Temp 97.8  F (36.6  C) (Oral)  Wt 169 lb (76.7 kg)  BMI 29.7 kg/m2  Body mass index is 29.7 kg/(m^2).  GENERAL: healthy, alert and no distress  NECK: no adenopathy, no asymmetry, masses, or scars and thyroid normal to palpation  RESP: lungs clear to auscultation - no rales, rhonchi or wheezes  CV: regular rate and rhythm, normal S1 S2, no S3 or S4, no murmur, click or rub, no peripheral edema and peripheral pulses strong  ABDOMEN: soft, nontender, no hepatosplenomegaly, no masses and bowel sounds normal  MS: no gross musculoskeletal defects noted, no edema      ASSESSMENT/PLAN:       ICD-10-CM    1. Essential hypertension with goal blood pressure less than 140/90 I10 order for DME   2. Hypokalemia E87.6 Potassium   3. Avitaminosis D E55.9 Vitamin D Deficiency   4. Tiredness R53.83 Vitamin B12   5. Gastroesophageal reflux disease, esophagitis presence not specified K21.9    6. Other constipation K59.09 polyethylene glycol (MIRALAX) powder   7. Reduced visual acuity H54.7 OPTOMETRY REFERRAL     BP at goal. Continue with the same medication. F/u in 6 months for recheck.   miralax for constipation, encouraged high fibre diet.     Unsure the exact etiology of her tiredness.  Her physical exam is reassuring. She denies depression / mood symptoms . Vitamin levels ordered. She was worried about low potassium level. explained several times that she is currently not on medication that can alter her potassium level, pt insisted to get it done.  Deconditioning is very likely. Encouraged regular exercise and healthy diet.         Sweetie Owen  MD [place]  [facility]

## 2017-08-14 NOTE — NURSING NOTE
"Chief Complaint   Patient presents with     Hypertension       Initial /83  Pulse 76  Temp 97.8  F (36.6  C) (Oral)  Wt 169 lb (76.7 kg)  BMI 29.7 kg/m2 Estimated body mass index is 29.7 kg/(m^2) as calculated from the following:    Height as of 5/17/17: 5' 3.25\" (1.607 m).    Weight as of this encounter: 169 lb (76.7 kg).  Medication Reconciliation: complete  Fede Castellanos MA    "

## 2017-08-14 NOTE — MR AVS SNAPSHOT
After Visit Summary   8/14/2017    Sanjuana Salamanca    MRN: [id number]           Patient Information     Date Of Birth          1955        Visit Information        Provider Department      8/14/2017 3:20 PM Sweetie Gao MD [facility]        Today's Diagnoses     Essential hypertension with goal blood pressure less than 140/90    -  1    Hypokalemia        Avitaminosis D        Tiredness        Gastroesophageal reflux disease, esophagitis presence not specified        Other constipation        Reduced visual acuity           Follow-ups after your visit        Additional Services     OPTOMETRY REFERRAL       Your provider has referred you to: [facility]: [facility] [phone]    [email or web address]    Please be aware that coverage of these services is subject to the terms and limitations of your health insurance plan.  Call member services at your health plan with any benefit or coverage questions.      Please bring the following with you to your appointment:    (1) Any X-Rays, CTs or MRIs which have been performed.  Contact the facility where they were done to arrange for  prior to your scheduled appointment.    (2) List of current medications  (3) This referral request   (4) Any documents/labs given to you for this referral                  Who to contact     If you have questions or need follow up information about today's clinic visit or your schedule please contact [facility] directly at [phone].  Normal or non-critical lab and imaging results will be communicated to you by MyChart, letter or phone within 4 business days after the clinic has received the results. If you do not hear from us within 7 days, please contact the clinic through MyChart or phone. If you have a critical or abnormal lab result, we will notify you by phone as soon as possible.  Submit refill requests through [employer]  "or call your pharmacy and they will forward the refill request to us. Please allow 3 business days for your refill to be completed.          Additional Information About Your Visit        MyChart Information     [employer] lets you send messages to your doctor, view your test results, renew your prescriptions, schedule appointments and more. To sign up, go to [email or web address] . Click on \"Log in\" on the left side of the screen, which will take you to the Welcome page. Then click on \"Sign up Now\" on the right side of the page.     You will be asked to enter the access code listed below, as well as some personal information. Please follow the directions to create your username and password.     Your access code is: [email or web address]  Expires: 2017  4:05 PM     Your access code will  in 90 days. If you need help or a new code, please call your [place] clinic or [phone].        Care EveryWhere ID     This is your Care EveryWhere ID. This could be used by other organizations to access your [place] medical records  [id number]        Your Vitals Were     Pulse Temperature BMI (Body Mass Index)             76 97.8  F (36.6  C) (Oral) 29.7 kg/m2          Blood Pressure from Last 3 Encounters:   17 137/83   17 138/78   17 121/84    Weight from Last 3 Encounters:   17 169 lb (76.7 kg)   17 171 lb (77.6 kg)   17 173 lb (78.5 kg)              We Performed the Following     OPTOMETRY REFERRAL     Potassium     Vitamin B12     Vitamin D Deficiency          Today's Medication Changes          These changes are accurate as of: 17  4:08 PM.  If you have any questions, ask your nurse or doctor.               Start taking these medicines.        Dose/Directions    order for DME   Used for:  Essential hypertension with goal blood pressure less than 140/90   Started by:  Sweetie Gao MD        Use as directed.   Quantity:  1 kit   Refills:  0         These medicines " have changed or have updated prescriptions.        Dose/Directions    * polyethylene glycol powder   Commonly known as:  MIRALAX   This may have changed:  Another medication with the same name was added. Make sure you understand how and when to take each.   Used for:  Constipation, unspecified constipation type   Changed by:  Sweetie Gao MD        Dose:  1 capful   Take 17 g (1 capful) by mouth daily   Quantity:  510 g   Refills:  1       * polyethylene glycol powder   Commonly known as:  MIRALAX   This may have changed:  You were already taking a medication with the same name, and this prescription was added. Make sure you understand how and when to take each.   Used for:  Other constipation   Changed by:  Sweetie Gao MD        Dose:  1 capful   Take 17 g (1 capful) by mouth daily   Quantity:  510 g   Refills:  1       * Notice:  This list has 2 medication(s) that are the same as other medications prescribed for you. Read the directions carefully, and ask your doctor or other care provider to review them with you.         Where to get your medicines      These medications were sent to [facility] [address] [place], [place] - [address] AT [facility] [address]  [address] [facility] [address]     Phone:  [phone]     polyethylene glycol powder         Some of these will need a paper prescription and others can be bought over the counter.  Ask your nurse if you have questions.     Bring a paper prescription for each of these medications     order for DME                Primary Care Provider Office Phone # Fax #    Sweetie Gao [phone] [address] [facility] [address]        Equal Access to Services     [facility]: [place], waaxda luqadaha, qaybta talisha vieyra. So [facility] [phone].    ATENCIÓN: susie Lawson  disposición servicios gratuitos de asistencia lingüística. Jeremiah mendoza [phone].    We comply with applicable federal civil rights laws and [place] laws. We do not discriminate on the basis of race, color, national origin, age, disability sex, sexual orientation or gender identity.            Thank you!     Thank you for choosing [facility]  for your care. Our goal is always to provide you with excellent care. Hearing back from our patients is one way we can continue to improve our services. Please take a few minutes to complete the written survey that you may receive in the mail after your visit with us. Thank you!             Your Updated Medication List - Protect others around you: Learn how to safely use, store and throw away your medicines at www.disposemymeds.org.          This list is accurate as of: 8/14/17  4:08 PM.  Always use your most recent med list.                   Brand Name Dispense Instructions for use Diagnosis    acetaminophen 325 MG tablet    TYLENOL    100 tablet    Take 1-2 tablets (325-650 mg) by mouth every 6 hours as needed for mild pain    Primary osteoarthritis of both knees       albuterol 108 (90 BASE) MCG/ACT Inhaler    PROAIR HFA/PROVENTIL HFA/VENTOLIN HFA    1 Inhaler    Inhale 2 puffs every 6 hours as needed for cough    Acute bronchospasm       cholecalciferol 1000 UNIT tablet    vitamin D    100 tablet    TAKE 1 TABLET BY MOUTH EVERY DAY    Avitaminosis D       diclofenac 50 MG EC tablet    VOLTAREN    90 tablet    TAKE 1 TABLET(50 MG) BY MOUTH THREE TIMES DAILY AS NEEDED FOR MODERATE PAIN    Osteoarthritis of knee, unspecified laterality, unspecified osteoarthritis type       metoprolol 25 MG tablet    LOPRESSOR    90 tablet    Take 0.5 tablets (12.5 mg) by mouth 2 times daily    Hypertension goal BP (blood pressure) < 140/90       omeprazole 20 MG tablet     180 tablet    Take 2 tablets (40 mg) by mouth daily Take 30-60 minutes before a meal.     Gastroesophageal reflux disease, esophagitis presence not specified       order for DME     1 kit    Use as directed.    Essential hypertension with goal blood pressure less than 140/90       * polyethylene glycol powder    MIRALAX    510 g    Take 17 g (1 capful) by mouth daily    Constipation, unspecified constipation type       * polyethylene glycol powder    MIRALAX    510 g    Take 17 g (1 capful) by mouth daily    Other constipation       * Notice:  This list has 2 medication(s) that are the same as other medications prescribed for you. Read the directions carefully, and ask your doctor or other care provider to review them with you.

## 2017-08-14 NOTE — LETTER
[facility]   [address] [place]  [address]  [phone]                                   August 21, 2017    Sanjuana Salamanca  [address]    [place] [address]        Dear Sanjuana,    Your recent vitamin D level is NORMAL.     Continue with vitamin D 1000 IU daily.     Results for orders placed or performed in visit on 08/14/17   Potassium   Result Value Ref Range    Potassium 3.9 3.4 - 5.3 mmol/L   Vitamin D Deficiency   Result Value Ref Range    Vitamin D Deficiency screening 32 20 - 75 ug/L   Vitamin B12   Result Value Ref Range    Vitamin B12 486 193 - 986 pg/mL       If you have any questions please call the clinic at [phone]    Sincerely,    Sweetie Gao MD  bmd

## 2017-08-14 NOTE — LETTER
[facility]   [address] [place]  [address]  [phone]                                   August 21, 2017    Sanjuana Salamanca  [address]    [place] [address]        Dear Sanjuana,    Your recent labs look good. Potassium is normal.     Results for orders placed or performed in visit on 08/14/17   Potassium   Result Value Ref Range    Potassium 3.9 3.4 - 5.3 mmol/L   Vitamin B12   Result Value Ref Range    Vitamin B12 486 193 - 986 pg/mL       If you have any questions please call the clinic at [phone]    Sincerely,    Sweetie Gao MD  bmd

## 2017-08-21 LAB — DEPRECATED CALCIDIOL+CALCIFEROL SERPL-MC: 32 UG/L (ref 20–75)

## 2017-08-21 NOTE — PROGRESS NOTES
Dear Sanjuana Salamanca,     Your recent vitamin D level is NORMAL.     Continue with vitamin D 1000 IU daily.     Sweetie Gao MD.   Family Physician.  [facility].

## 2017-08-21 NOTE — PROGRESS NOTES
Dear Sanjuana Salamanca,     Your recent labs look good. Potassium is normal.     Sweetie Gao MD.   Family Physician.  [facility].

## 2017-08-22 ENCOUNTER — TELEPHONE (OUTPATIENT)
Dept: FAMILY MEDICINE | Facility: CLINIC | Age: 62
End: 2017-08-22

## 2017-08-22 NOTE — LETTER
August 22, 2017    Sanjuana Salamanca  [address]    [place] [address]    Dear Sanjuana    We care about your health and have reviewed your health plan. We have reviewed your medical conditions, medication list, and lab results and are making recommendations based on this review, to better manage your health.    You are in particular need of attention regarding:  - Scheduling a Physical with a Cervical Cancer Screening (Pap Smear) age 64 and younger [phone]      Here is a list of Health Maintenance topics that are due now or due soon:  Health Maintenance Due   Topic Date Due     EYE EXAM Q1 YEAR  03/19/2016     PAP SCREENING Q3 YR (SYSTEM ASSIGNED)  05/15/2017     We will be calling you in the next couple of weeks to help you schedule any appointments that are needed.  Please call us at [phone] (or use [employer]) to address the above recommendations.     Thank you for trusting [facility] and we appreciate the opportunity to serve you.  We look forward to supporting your healthcare needs in the future.    Healthy Regards,    Dr. Gao/shital

## 2017-08-22 NOTE — LETTER
September 12, 2017    Sanjuana Salamanca  [address]    [place] [address]      Dear Sanjuana Salamanca,     We have tried to contact you about your health, but have been unable to reach you.  Please call us as soon as possible so we can provide you with the best care possible.  We will continue to check in with you throughout the year to complete these items of care, if you are not able to complete these items at this time.  If you would like to complete the missing items for your care, please contact us at [phone].    We recommend the following:  -schedule a PAP SMEAR EXAM which is due.  Please disregard this reminder if you have had this exam elsewhere within the last year.  It would be helpful for us to have a copy of your recent pap smear report in our file so that we can best coordinate your care.  -schedule a PHYSICAL with your provider.    Sincerely,     Your Care Team at [facility]

## 2017-08-22 NOTE — TELEPHONE ENCOUNTER
Panel Management Review      Patient has the following on her problem list:     Hypertension   Last three blood pressure readings:  BP Readings from Last 3 Encounters:   08/14/17 137/83   05/17/17 138/78   04/28/17 121/84     Blood pressure: Passed    HTN Guidelines:  Age 18-59 BP range:  Less than 140/90  Age 60-85 with Diabetes:  Less than 140/90  Age 60-85 without Diabetes:  less than 150/90        Composite cancer screening  Chart review shows that this patient is due/due soon for the following Pap Smear  Summary:    Patient is due/failing the following:   PAP and PHYSICAL    Action needed:   Patient needs office visit for physical, pap.    Type of outreach:    Sent letter. 8/22/17    Questions for provider review:    None                                                                                                                                    Mari Allan [facility]        Chart routed to Care Team .

## 2017-08-25 ENCOUNTER — OFFICE VISIT (OUTPATIENT)
Dept: FAMILY MEDICINE | Facility: CLINIC | Age: 62
End: 2017-08-25
Payer: COMMERCIAL

## 2017-08-25 VITALS
BODY MASS INDEX: 29.88 KG/M2 | WEIGHT: 170 LBS | OXYGEN SATURATION: 97 % | DIASTOLIC BLOOD PRESSURE: 77 MMHG | TEMPERATURE: 98 F | HEART RATE: 91 BPM | SYSTOLIC BLOOD PRESSURE: 130 MMHG

## 2017-08-25 DIAGNOSIS — J20.8 ACUTE BRONCHITIS DUE TO OTHER SPECIFIED ORGANISMS: Primary | ICD-10-CM

## 2017-08-25 DIAGNOSIS — E55.9 AVITAMINOSIS D: ICD-10-CM

## 2017-08-25 PROCEDURE — 99213 OFFICE O/P EST LOW 20 MIN: CPT | Performed by: FAMILY MEDICINE

## 2017-08-25 RX ORDER — ALBUTEROL SULFATE 90 UG/1
2 AEROSOL, METERED RESPIRATORY (INHALATION) EVERY 6 HOURS PRN
Qty: 1 INHALER | Refills: 0 | Status: SHIPPED | OUTPATIENT
Start: 2017-08-25 | End: 2017-10-16

## 2017-08-25 RX ORDER — AZITHROMYCIN 250 MG/1
TABLET, FILM COATED ORAL
Qty: 6 TABLET | Refills: 0 | Status: SHIPPED | OUTPATIENT
Start: 2017-08-25 | End: 2017-09-08

## 2017-08-25 RX ORDER — GUAIFENESIN/DEXTROMETHORPHAN 100-10MG/5
5 SYRUP ORAL EVERY 4 HOURS PRN
Qty: 560 ML | Refills: 0 | Status: SHIPPED | OUTPATIENT
Start: 2017-08-25 | End: 2017-10-11

## 2017-08-25 NOTE — PROGRESS NOTES
SUBJECTIVE:   Sanjuana Salamanca is a 62 year old female who presents to clinic today for the following health issues:    ENT Symptoms             Symptoms: cc Present Absent Comment   Fever/Chills  x  Chills    Fatigue  x     Muscle Aches   x    Eye Irritation   x    Sneezing  x     Nasal Nicholas/Drg  x     Sinus Pressure/Pain   x    Loss of smell   x    Dental pain   x    Sore Throat  x  Mild    Swollen Glands   x    Ear Pain/Fullness   x    Cough  x  Dry cough.    Wheeze  x  Sometimes, she had similar wheezing in the past and albuterol inhaler helped.    Chest Pain   x    Shortness of breath   x    Rash   x    Other         Symptom duration:  1 week - 10 days.    Symptom severity:  moderate   Treatments tried:  cold med OTC    Contacts:  NONE        Problem list and histories reviewed & adjusted, as indicated.  Additional history: as documented    Patient Active Problem List   Diagnosis     Avitaminosis D     Esophageal reflux     OA (osteoarthritis) of knee     Advanced directives, counseling/discussion     Hyperlipidemia LDL goal <160     Cataracts, both eyes     Dry eyes     Hypokalemia     Prediabetes     Essential hypertension with goal blood pressure less than 140/90     History reviewed. No pertinent surgical history.    Social History   Substance Use Topics     Smoking status: Never Smoker     Smokeless tobacco: Never Used     Alcohol use No     Family History   Problem Relation Age of Onset     Hypertension Mother      Thyroid Disease Mother      DIABETES Other      CEREBROVASCULAR DISEASE Other      Glaucoma No family hx of      Macular Degeneration No family hx of      CANCER No family hx of          Current Outpatient Prescriptions   Medication Sig Dispense Refill     order for DME Use as directed. 1 kit 0     diclofenac (VOLTAREN) 50 MG EC tablet TAKE 1 TABLET(50 MG) BY MOUTH THREE TIMES DAILY AS NEEDED FOR MODERATE PAIN 90 tablet 0     metoprolol (LOPRESSOR) 25 MG tablet Take 0.5 tablets (12.5 mg) by mouth  2 times daily 90 tablet 1     acetaminophen (TYLENOL) 325 MG tablet Take 1-2 tablets (325-650 mg) by mouth every 6 hours as needed for mild pain 100 tablet 3     omeprazole 20 MG tablet Take 2 tablets (40 mg) by mouth daily Take 30-60 minutes before a meal. 180 tablet 1     polyethylene glycol (MIRALAX) powder Take 17 g (1 capful) by mouth daily 510 g 1     BP Readings from Last 3 Encounters:   08/25/17 130/77   08/14/17 137/83   05/17/17 138/78    Wt Readings from Last 3 Encounters:   08/25/17 170 lb (77.1 kg)   08/14/17 169 lb (76.7 kg)   05/17/17 171 lb (77.6 kg)            Reviewed and updated as needed this visit by clinical staffTobacco  Allergies  Meds  Med Hx  Surg Hx  Fam Hx  Soc Hx      Reviewed and updated as needed this visit by Provider         ROS:  Constitutional, HEENT, cardiovascular, pulmonary, gi and gu systems are negative, except as otherwise noted.      OBJECTIVE:   /77  Pulse 91  Temp 98  F (36.7  C) (Oral)  Wt 170 lb (77.1 kg)  SpO2 97%  BMI 29.88 kg/m2  Body mass index is 29.88 kg/(m^2).  GENERAL: healthy, alert and no distress  HENT: ear canals and TM's normal, nose: clear nasal discharge and mouth without ulcers or lesions  NECK: no adenopathy, no asymmetry, masses, or scars and thyroid normal to palpation  RESP: lungs : bilateral minimal expiratory wheezing.   CV: regular rate and rhythm, normal S1 S2, no S3 or S4, no murmur, click or rub, no peripheral edema and peripheral pulses strong  MS: no gross musculoskeletal defects noted, no edema      ASSESSMENT/PLAN:       ICD-10-CM    1. Acute bronchitis due to other specified organisms J20.8 azithromycin (ZITHROMAX) 250 MG tablet     guaiFENesin-dextromethorphan (ROBITUSSIN DM) 100-10 MG/5ML syrup     albuterol (PROAIR HFA/PROVENTIL HFA/VENTOLIN HFA) 108 (90 BASE) MCG/ACT Inhaler   2. Avitaminosis D E55.9 cholecalciferol (VITAMIN D3) 1000 UNIT tablet     F/u if symptoms do not improve.       Sweetie Gao,  MD  [facility]

## 2017-08-25 NOTE — MR AVS SNAPSHOT
"              After Visit Summary   8/25/2017    Sanjuana Salamanca    MRN: [id number]           Patient Information     Date Of Birth          1955        Visit Information        Provider Department      8/25/2017 8:20 AM Sweetie Gao MD [facility]        Today's Diagnoses     Acute bronchitis due to other specified organisms    -  1    Avitaminosis D           Follow-ups after your visit        Your next 10 appointments already scheduled     Sep 14, 2017 11:00 AM CDT   New Visit with Monique Dale OD   [facility] ([facility]    [address] [place] [address] [phone]              Who to contact     If you have questions or need follow up information about today's clinic visit or your schedule please contact [facility] directly at [phone].  Normal or non-critical lab and imaging results will be communicated to you by MyChart, letter or phone within 4 business days after the clinic has received the results. If you do not hear from us within 7 days, please contact the clinic through MyChart or phone. If you have a critical or abnormal lab result, we will notify you by phone as soon as possible.  Submit refill requests through Erydel or call your pharmacy and they will forward the refill request to us. Please allow 3 business days for your refill to be completed.          Additional Information About Your Visit        MyChart Information     Erydel lets you send messages to your doctor, view your test results, renew your prescriptions, schedule appointments and more. To sign up, go to [email or web address] . Click on \"Log in\" on the left side of the screen, which will take you to the Welcome page. Then click on \"Sign up Now\" on the right side of the page.     You will be asked to enter the access code listed below, as well as some personal information. Please follow the directions to create your " username and password.     Your access code is: [email or web address]  Expires: 2017  4:05 PM     Your access code will  in 90 days. If you need help or a new code, please call your [facility] clinic or [phone].        Care EveryWhere ID     This is your Care EveryWhere ID. This could be used by other organizations to access your [facility] medical records  [id number]        Your Vitals Were     Pulse Temperature Pulse Oximetry BMI (Body Mass Index)          91 98  F (36.7  C) (Oral) 97% 29.88 kg/m2         Blood Pressure from Last 3 Encounters:   17 130/77   17 137/83   17 138/78    Weight from Last 3 Encounters:   17 170 lb (77.1 kg)   17 169 lb (76.7 kg)   17 171 lb (77.6 kg)              Today, you had the following     No orders found for display         Today's Medication Changes          These changes are accurate as of: 17  9:06 AM.  If you have any questions, ask your nurse or doctor.               Start taking these medicines.        Dose/Directions    albuterol 108 (90 BASE) MCG/ACT Inhaler   Commonly known as:  PROAIR HFA/PROVENTIL HFA/VENTOLIN HFA   Used for:  Acute bronchitis due to other specified organisms   Started by:  Sweetie Gao MD        Dose:  2 puff   Inhale 2 puffs into the lungs every 6 hours as needed for shortness of breath / dyspnea or wheezing   Quantity:  1 Inhaler   Refills:  0       azithromycin 250 MG tablet   Commonly known as:  ZITHROMAX   Used for:  Acute bronchitis due to other specified organisms   Started by:  Sweetie Gao MD        Two tablets first day, then one tablet daily for four days.   Quantity:  6 tablet   Refills:  0       guaiFENesin-dextromethorphan 100-10 MG/5ML syrup   Commonly known as:  ROBITUSSIN DM   Used for:  Acute bronchitis due to other specified organisms   Started by:  Sweetie Goa MD        Dose:  5 mL   Take 5 mLs by mouth every 4 hours as needed for cough   Quantity:   560 mL   Refills:  0         These medicines have changed or have updated prescriptions.        Dose/Directions    cholecalciferol 1000 UNIT tablet   Commonly known as:  vitamin D   This may have changed:  See the new instructions.   Used for:  Avitaminosis D   Changed by:  Sweetie Gao MD        Dose:  1000 Units   Take 1 tablet (1,000 Units) by mouth daily   Quantity:  100 tablet   Refills:  3            Where to get your medicines      These medications were sent to [facility] [address] [address] [place] [address] AT [facility] [address]  [address] [facility] [address]     Phone:  [phone]     albuterol 108 (90 BASE) MCG/ACT Inhaler    azithromycin 250 MG tablet    cholecalciferol 1000 UNIT tablet    guaiFENesin-dextromethorphan 100-10 MG/5ML syrup                Primary Care Provider Office Phone # Fax #    Sweetie Gao [phone] [address] [facility] [address]        Equal Access to Services     ED [facility] HospitalCECIL[facility] AH: Hadii aad ku hadasho Soomaali, waaxda luqadaha, qaybta kaalmada adeegyada, waxay idiin haynicoletten charan ley . So [facility] [phone].    ATENCIÓN: Si habla español, tiene a ross disposición servicios gratuitos de asistencia lingüística. Llame al [phone].    We comply with applicable federal civil rights laws and [place] laws. We do not discriminate on the basis of race, color, national origin, age, disability sex, sexual orientation or gender identity.            Thank you!     Thank you for choosing [facility]  for your care. Our goal is always to provide you with excellent care. Hearing back from our patients is one way we can continue to improve our services. Please take a few minutes to complete the written survey that you may receive in the mail after your visit with us. Thank you!             Your Updated Medication List - Protect others around you: Learn how to  safely use, store and throw away your medicines at www.disposemymeds.org.          This list is accurate as of: 8/25/17  9:06 AM.  Always use your most recent med list.                   Brand Name Dispense Instructions for use Diagnosis    acetaminophen 325 MG tablet    TYLENOL    100 tablet    Take 1-2 tablets (325-650 mg) by mouth every 6 hours as needed for mild pain    Primary osteoarthritis of both knees       albuterol 108 (90 BASE) MCG/ACT Inhaler    PROAIR HFA/PROVENTIL HFA/VENTOLIN HFA    1 Inhaler    Inhale 2 puffs into the lungs every 6 hours as needed for shortness of breath / dyspnea or wheezing    Acute bronchitis due to other specified organisms       azithromycin 250 MG tablet    ZITHROMAX    6 tablet    Two tablets first day, then one tablet daily for four days.    Acute bronchitis due to other specified organisms       cholecalciferol 1000 UNIT tablet    vitamin D    100 tablet    Take 1 tablet (1,000 Units) by mouth daily    Avitaminosis D       diclofenac 50 MG EC tablet    VOLTAREN    90 tablet    TAKE 1 TABLET(50 MG) BY MOUTH THREE TIMES DAILY AS NEEDED FOR MODERATE PAIN    Osteoarthritis of knee, unspecified laterality, unspecified osteoarthritis type       guaiFENesin-dextromethorphan 100-10 MG/5ML syrup    ROBITUSSIN DM    560 mL    Take 5 mLs by mouth every 4 hours as needed for cough    Acute bronchitis due to other specified organisms       metoprolol 25 MG tablet    LOPRESSOR    90 tablet    Take 0.5 tablets (12.5 mg) by mouth 2 times daily    Hypertension goal BP (blood pressure) < 140/90       omeprazole 20 MG tablet     180 tablet    Take 2 tablets (40 mg) by mouth daily Take 30-60 minutes before a meal.    Gastroesophageal reflux disease, esophagitis presence not specified       order for DME     1 kit    Use as directed.    Essential hypertension with goal blood pressure less than 140/90       polyethylene glycol powder    MIRALAX    510 g    Take 17 g (1 capful) by mouth daily     Constipation, unspecified constipation type

## 2017-08-25 NOTE — NURSING NOTE
"Chief Complaint   Patient presents with     Cough       Initial /77  Pulse 91  Temp 98  F (36.7  C) (Oral)  Wt 170 lb (77.1 kg)  SpO2 97%  BMI 29.88 kg/m2 Estimated body mass index is 29.88 kg/(m^2) as calculated from the following:    Height as of 5/17/17: 5' 3.25\" (1.607 m).    Weight as of this encounter: 170 lb (77.1 kg).  Medication Reconciliation: complete  Fede Castellanos MA    "

## 2017-09-05 NOTE — TELEPHONE ENCOUNTER
Called patient and left a message to return call and schedule an appointment for health maintenance items that are due.  Mari Allan CMA

## 2017-09-08 ENCOUNTER — RADIANT APPOINTMENT (OUTPATIENT)
Dept: GENERAL RADIOLOGY | Facility: CLINIC | Age: 62
End: 2017-09-08
Attending: FAMILY MEDICINE
Payer: COMMERCIAL

## 2017-09-08 ENCOUNTER — OFFICE VISIT (OUTPATIENT)
Dept: FAMILY MEDICINE | Facility: CLINIC | Age: 62
End: 2017-09-08
Payer: COMMERCIAL

## 2017-09-08 VITALS
DIASTOLIC BLOOD PRESSURE: 84 MMHG | TEMPERATURE: 98 F | SYSTOLIC BLOOD PRESSURE: 125 MMHG | OXYGEN SATURATION: 97 % | HEART RATE: 93 BPM | BODY MASS INDEX: 29.53 KG/M2 | WEIGHT: 168 LBS

## 2017-09-08 DIAGNOSIS — L81.9 HYPERPIGMENTED SKIN LESION: ICD-10-CM

## 2017-09-08 DIAGNOSIS — B36.0 TINEA VERSICOLOR: ICD-10-CM

## 2017-09-08 DIAGNOSIS — R05.9 COUGH: Primary | ICD-10-CM

## 2017-09-08 DIAGNOSIS — R05.9 COUGH: ICD-10-CM

## 2017-09-08 LAB
ERYTHROCYTE [DISTWIDTH] IN BLOOD BY AUTOMATED COUNT: 15 % (ref 10–15)
HCT VFR BLD AUTO: 41.9 % (ref 35–47)
HGB BLD-MCNC: 13.8 G/DL (ref 11.7–15.7)
KOH PREP SPEC: ABNORMAL
MCH RBC QN AUTO: 28.5 PG (ref 26.5–33)
MCHC RBC AUTO-ENTMCNC: 32.9 G/DL (ref 31.5–36.5)
MCV RBC AUTO: 86 FL (ref 78–100)
PLATELET # BLD AUTO: 361 10E9/L (ref 150–450)
RBC # BLD AUTO: 4.85 10E12/L (ref 3.8–5.2)
SPECIMEN SOURCE: ABNORMAL
WBC # BLD AUTO: 9.6 10E9/L (ref 4–11)

## 2017-09-08 PROCEDURE — 99214 OFFICE O/P EST MOD 30 MIN: CPT | Performed by: FAMILY MEDICINE

## 2017-09-08 PROCEDURE — 87220 TISSUE EXAM FOR FUNGI: CPT | Performed by: FAMILY MEDICINE

## 2017-09-08 PROCEDURE — 85027 COMPLETE CBC AUTOMATED: CPT | Performed by: FAMILY MEDICINE

## 2017-09-08 PROCEDURE — 36415 COLL VENOUS BLD VENIPUNCTURE: CPT | Performed by: FAMILY MEDICINE

## 2017-09-08 PROCEDURE — 71020 XR CHEST 2 VW: CPT

## 2017-09-08 RX ORDER — SELENIUM SULFIDE 2.5 MG/100ML
LOTION TOPICAL
Qty: 118 ML | Refills: 1 | Status: SHIPPED | OUTPATIENT
Start: 2017-09-08 | End: 2018-03-30

## 2017-09-08 RX ORDER — BENZONATATE 100 MG/1
100 CAPSULE ORAL 2 TIMES DAILY PRN
Qty: 42 CAPSULE | Refills: 0 | Status: SHIPPED | OUTPATIENT
Start: 2017-09-08 | End: 2017-10-11

## 2017-09-08 NOTE — NURSING NOTE
"Chief Complaint   Patient presents with     Cough     x 2 weeks        Initial /84  Pulse 93  Temp 98  F (36.7  C) (Oral)  Wt 168 lb (76.2 kg)  SpO2 97%  BMI 29.53 kg/m2 Estimated body mass index is 29.53 kg/(m^2) as calculated from the following:    Height as of 5/17/17: 5' 3.25\" (1.607 m).    Weight as of this encounter: 168 lb (76.2 kg).  Medication Reconciliation: complete  Fede Castellanos MA    "

## 2017-09-08 NOTE — MR AVS SNAPSHOT
After Visit Summary   9/8/2017    Sanjuana Salamanca    MRN: [id number]           Patient Information     Date Of Birth          1955        Visit Information        Provider Department      9/8/2017 1:40 PM Sweetie Gao MD [facility]        Today's Diagnoses     Cough    -  1    Hyperpigmented skin lesion        Tinea versicolor           Follow-ups after your visit        Additional Services     ALLERGY/ASTHMA ADULT REFERRAL       Your provider has referred you to: [facility]: [facility] [phone]  [email or web address]    Please be aware that coverage of these services is subject to the terms and limitations of your health insurance plan.  Call member services at your health plan with any benefit or coverage questions.      Please bring the following with you to your appointment:    (1) Any X-Rays, CTs or MRIs which have been performed.  Contact the facility where they were done to arrange for  prior to your scheduled appointment.    (2) List of current medications  (3) This referral request   (4) Any documents/labs given to you for this referral            OTOLARYNGOLOGY REFERRAL       Your provider has referred you to: [facility]: [facility] [phone]   [email or web address]    Please be aware that coverage of these services is subject to the terms and limitations of your health insurance plan.  Call member services at your health plan with any benefit or coverage questions.      Please bring the following with you to your appointment:    (1) Any X-Rays, CTs or MRIs which have been performed.  Contact the facility where they were done to arrange for  prior to your scheduled appointment.   (2) List of current medications  (3) This referral request   (4) Any documents/labs given to you for this referral                  Your next 10 appointments already scheduled     Sep 14,  " 11:20 AM CDT   New Visit with Monique Dale OD   [facility]    [address] [facility] [address] [phone]              Who to contact     If you have questions or need follow up information about today's clinic visit or your schedule please contact [facility] directly at [phone].  Normal or non-critical lab and imaging results will be communicated to you by SmartHabitathart, letter or phone within 4 business days after the clinic has received the results. If you do not hear from us within 7 days, please contact the clinic through SmartHabitathart or phone. If you have a critical or abnormal lab result, we will notify you by phone as soon as possible.  Submit refill requests through S[employer]X or call your pharmacy and they will forward the refill request to us. Please allow 3 business days for your refill to be completed.          Additional Information About Your Visit        SmartHabitatharDoorman Information     SustainX lets you send messages to your doctor, view your test results, renew your prescriptions, schedule appointments and more. To sign up, go to [email or web address] . Click on \"Log in\" on the left side of the screen, which will take you to the Welcome page. Then click on \"Sign up Now\" on the right side of the page.     You will be asked to enter the access code listed below, as well as some personal information. Please follow the directions to create your username and password.     Your access code is: [email or web address]  Expires: 2017  4:05 PM     Your access code will  in 90 days. If you need help or a new code, please call your [facility] or [phone].        Care EveryWhere ID     This is your Care EveryWhere ID. This could be used by other organizations to access your [facility] medical records  [id number]        Your Vitals Were     Pulse Temperature Pulse Oximetry BMI (Body Mass Index)          93 98  F (36.7  C) " (Oral) 97% 29.53 kg/m2         Blood Pressure from Last 3 Encounters:   09/08/17 125/84   08/25/17 130/77   08/14/17 137/83    Weight from Last 3 Encounters:   09/08/17 168 lb (76.2 kg)   08/25/17 170 lb (77.1 kg)   08/14/17 169 lb (76.7 kg)              We Performed the Following     ALLERGY/ASTHMA ADULT REFERRAL     CBC with platelets     KOH prep (skin, hair or nails only)     OTOLARYNGOLOGY REFERRAL          Today's Medication Changes          These changes are accurate as of: 9/8/17  2:54 PM.  If you have any questions, ask your nurse or doctor.               Start taking these medicines.        Dose/Directions    benzonatate 100 MG capsule   Commonly known as:  TESSALON   Used for:  Cough   Started by:  Sweetie Gao MD        Dose:  100 mg   Take 1 capsule (100 mg) by mouth 2 times daily as needed for cough   Quantity:  42 capsule   Refills:  0       selenium sulfide 2.5 % lotion   Commonly known as:  SELSUN   Used for:  Tinea versicolor   Started by:  Sweetie Gao MD        Apply to affected area and lather with small amounts of water; leave on skin for 10 minutes, then rinse thoroughly; repeat once every day for 7 days for 8 weeks.   Quantity:  118 mL   Refills:  1            Where to get your medicines      These medications were sent to [facility] [address] [place], [place] - [address] AT [facility] [address]  [address] [facility] [address]     Phone:  [phone]     benzonatate 100 MG capsule         Some of these will need a paper prescription and others can be bought over the counter.  Ask your nurse if you have questions.     Bring a paper prescription for each of these medications     selenium sulfide 2.5 % lotion                Primary Care Provider Office Phone # Fax #    Sweetie Gao [phone] [address] [facility] [address]        Equal Access to Services     ROSA SINCLAIR AH: [place]  yovana Lea, waorlandoda luqadaha, qaybta kaalmada ana maria, talisha boucher daríodenver garcia lamikaylalogan trini. So [facility] [phone].    ATENCIÓN: Si cynthiala lupe, tiene a ross disposición servicios gratuitos de asistencia lingüística. Jeremiah al [phone].    We comply with applicable federal civil rights laws and [place] laws. We do not discriminate on the basis of race, color, national origin, age, disability sex, sexual orientation or gender identity.            Thank you!     Thank you for choosing [facility]  for your care. Our goal is always to provide you with excellent care. Hearing back from our patients is one way we can continue to improve our services. Please take a few minutes to complete the written survey that you may receive in the mail after your visit with us. Thank you!             Your Updated Medication List - Protect others around you: Learn how to safely use, store and throw away your medicines at www.disposemymeds.org.          This list is accurate as of: 9/8/17  2:54 PM.  Always use your most recent med list.                   Brand Name Dispense Instructions for use Diagnosis    acetaminophen 325 MG tablet    TYLENOL    100 tablet    Take 1-2 tablets (325-650 mg) by mouth every 6 hours as needed for mild pain    Primary osteoarthritis of both knees       albuterol 108 (90 BASE) MCG/ACT Inhaler    PROAIR HFA/PROVENTIL HFA/VENTOLIN HFA    1 Inhaler    Inhale 2 puffs into the lungs every 6 hours as needed for shortness of breath / dyspnea or wheezing    Acute bronchitis due to other specified organisms       benzonatate 100 MG capsule    TESSALON    42 capsule    Take 1 capsule (100 mg) by mouth 2 times daily as needed for cough    Cough       cholecalciferol 1000 UNIT tablet    vitamin D    100 tablet    Take 1 tablet (1,000 Units) by mouth daily    Avitaminosis D       diclofenac 50 MG EC tablet    VOLTAREN    90 tablet    TAKE 1 TABLET(50 MG) BY MOUTH THREE TIMES DAILY  AS NEEDED FOR MODERATE PAIN    Osteoarthritis of knee, unspecified laterality, unspecified osteoarthritis type       guaiFENesin-dextromethorphan 100-10 MG/5ML syrup    ROBITUSSIN DM    560 mL    Take 5 mLs by mouth every 4 hours as needed for cough    Acute bronchitis due to other specified organisms       metoprolol 25 MG tablet    LOPRESSOR    90 tablet    Take 0.5 tablets (12.5 mg) by mouth 2 times daily    Hypertension goal BP (blood pressure) < 140/90       omeprazole 20 MG tablet     180 tablet    Take 2 tablets (40 mg) by mouth daily Take 30-60 minutes before a meal.    Gastroesophageal reflux disease, esophagitis presence not specified       order for DME     1 kit    Use as directed.    Essential hypertension with goal blood pressure less than 140/90       polyethylene glycol powder    MIRALAX    510 g    Take 17 g (1 capful) by mouth daily    Constipation, unspecified constipation type       selenium sulfide 2.5 % lotion    SELSUN    118 mL    Apply to affected area and lather with small amounts of water; leave on skin for 10 minutes, then rinse thoroughly; repeat once every day for 7 days for 8 weeks.    Tinea versicolor

## 2017-09-08 NOTE — LETTER
[facility]   [address] [place]  [address]  [phone]                                   September 11, 2017    Sanjuana Salamanca  [address]    [facility] [address]        Dear Sanjuana,    Your recent chest x ray is read by Radiologist and it is normal.     Results for orders placed or performed in visit on 09/08/17   XR Chest 2 Views    Narrative    XR CHEST 2 VW 9/8/2017 2:38 PM    COMPARISON: 3/16/2016    HISTORY: Cough      Impression    IMPRESSION: Cardiac silhouette and pulmonary vasculature are within  normal limits. No focal airspace disease, pleural effusion or  pneumothorax.    BATOOL CHRISTIANSON MD       If you have any questions please call the clinic at [phone]    Sincerely,    Sweetie Gao MD.   Family Physician.  [facility].   bmd

## 2017-09-08 NOTE — PROGRESS NOTES
Dear Sanjuana Salamanca,     Your recent chest x ray is read by Radiologist and it is normal.     Sweetie Gao MD.   Family Physician.  [facility].

## 2017-09-08 NOTE — PROGRESS NOTES
SUBJECTIVE:   Sanjuana Salamanca is a 62 year old female who presents to clinic today for the following health issues:    ENT Symptoms             Symptoms: cc Present Absent Comment   Fever/Chills   x    Fatigue   x    Muscle Aches   x    Eye Irritation   x    Sneezing   x    Nasal Nicholas/Drg   x    Sinus Pressure/Pain   x    Loss of smell   x    Dental pain   x    Sore Throat   x    Swollen Glands   x    Ear Pain/Fullness   x    Cough  x     Wheeze   x    Chest Pain   x    Shortness of breath   x    Rash   x    Other         Symptom duration:  2 weeks    Symptom severity:  moderate   Treatments tried:  cough syrup and finished Zithromax    Contacts:  none      She was prescribed z pack and albuterol.   She has completed antibiotic course.   Albuterol use: using it every 6 hrs.   She has dry cough. Cough is not worse at night. No fever. No body ache.   She used to have cough in the winter only, since last few yrs it has been more often.   She has some discomfort in her lower throat , upper chest area. Clears her throat often.   Her GERD is under good control.     # dark colored rash on her upper back, little itching is present.     Problem list and histories reviewed & adjusted, as indicated.  Additional history: as documented    Patient Active Problem List   Diagnosis     Avitaminosis D     Esophageal reflux     OA (osteoarthritis) of knee     Advanced directives, counseling/discussion     Hyperlipidemia LDL goal <160     Cataracts, both eyes     Dry eyes     Hypokalemia     Prediabetes     Essential hypertension with goal blood pressure less than 140/90     History reviewed. No pertinent surgical history.    Social History   Substance Use Topics     Smoking status: Never Smoker     Smokeless tobacco: Never Used     Alcohol use No     Family History   Problem Relation Age of Onset     Hypertension Mother      Thyroid Disease Mother      DIABETES Other      CEREBROVASCULAR DISEASE Other      Glaucoma No family hx of       Macular Degeneration No family hx of      CANCER No family hx of          Current Outpatient Prescriptions   Medication Sig Dispense Refill     guaiFENesin-dextromethorphan (ROBITUSSIN DM) 100-10 MG/5ML syrup Take 5 mLs by mouth every 4 hours as needed for cough 560 mL 0     albuterol (PROAIR HFA/PROVENTIL HFA/VENTOLIN HFA) 108 (90 BASE) MCG/ACT Inhaler Inhale 2 puffs into the lungs every 6 hours as needed for shortness of breath / dyspnea or wheezing 1 Inhaler 0     cholecalciferol (VITAMIN D3) 1000 UNIT tablet Take 1 tablet (1,000 Units) by mouth daily 100 tablet 3     order for DME Use as directed. 1 kit 0     diclofenac (VOLTAREN) 50 MG EC tablet TAKE 1 TABLET(50 MG) BY MOUTH THREE TIMES DAILY AS NEEDED FOR MODERATE PAIN 90 tablet 0     metoprolol (LOPRESSOR) 25 MG tablet Take 0.5 tablets (12.5 mg) by mouth 2 times daily 90 tablet 1     acetaminophen (TYLENOL) 325 MG tablet Take 1-2 tablets (325-650 mg) by mouth every 6 hours as needed for mild pain 100 tablet 3     omeprazole 20 MG tablet Take 2 tablets (40 mg) by mouth daily Take 30-60 minutes before a meal. 180 tablet 1     polyethylene glycol (MIRALAX) powder Take 17 g (1 capful) by mouth daily 510 g 1     BP Readings from Last 3 Encounters:   09/08/17 125/84   08/25/17 130/77   08/14/17 137/83    Wt Readings from Last 3 Encounters:   09/08/17 168 lb (76.2 kg)   08/25/17 170 lb (77.1 kg)   08/14/17 169 lb (76.7 kg)             Labs reviewed in EPIC    Reviewed and updated as needed this visit by clinical staffTobacco  Allergies  Meds  Med Hx  Surg Hx  Fam Hx  Soc Hx      Reviewed and updated as needed this visit by Provider         ROS:  Constitutional, HEENT, cardiovascular, pulmonary, gi and gu systems are negative, except as otherwise noted.      OBJECTIVE:   /84  Pulse 93  Temp 98  F (36.7  C) (Oral)  Wt 168 lb (76.2 kg)  SpO2 97%  BMI 29.53 kg/m2  Body mass index is 29.53 kg/(m^2).  GENERAL: healthy, alert and no distress  HENT: ear  canals and TM's : left side: scarred. , nose and mouth without ulcers or lesions  NECK: no adenopathy  RESP: lungs clear to auscultation - no rales, rhonchi or wheezes  CV: regular rate and rhythm, normal S1 S2, no S3 or S4, no murmur, click or rub, no peripheral edema and peripheral pulses strong  ABDOMEN: soft, nontender, no hepatosplenomegaly, no masses and bowel sounds normal  SKIN: hyperpigmented macules, flat patch on the upper mid back area.   She has hyperpigmented macules on her face as well. Facial lesions do not itch though.     Results for orders placed or performed in visit on 09/08/17   CBC with platelets   Result Value Ref Range    WBC 9.6 4.0 - 11.0 10e9/L    RBC Count 4.85 3.8 - 5.2 10e12/L    Hemoglobin 13.8 11.7 - 15.7 g/dL    Hematocrit 41.9 35.0 - 47.0 %    MCV 86 78 - 100 fl    MCH 28.5 26.5 - 33.0 pg    MCHC 32.9 31.5 - 36.5 g/dL    RDW 15.0 10.0 - 15.0 %    Platelet Count 361 150 - 450 10e9/L   KOH prep (skin, hair or nails only)   Result Value Ref Range    Specimen Description Skin     KOH Skin Hair Nails Test Fungal elements seen (A)        ASSESSMENT/PLAN:       ICD-10-CM    1. Cough R05 XR Chest 2 Views     CBC with platelets     OTOLARYNGOLOGY REFERRAL     ALLERGY/ASTHMA ADULT REFERRAL     benzonatate (TESSALON) 100 MG capsule   2. Hyperpigmented skin lesion L81.9 KOH prep (skin, hair or nails only)   3. Tinea versicolor B36.0 selenium sulfide (SELSUN) 2.5 % lotion     This pt has bene having on and off cough for a while now.   Her lungs were clear on today's exam, she used albuterol approx 3-4 hrs ago.   Cbc reassuring, chest x ray reviewed and not concerning, official read by Radiologist is still pending.     As she has discomfort in her lower throat area with change in voice and throat clearing, ENT referral for laryngeal exam.   She needs to see allergy and asthma specialist as well as her symptoms could very well likely from allergies or asthma.   Tessalon pearls for symptomatic Rx.      ALTA result discussed. Rx as above.     F/u prn.     Sweetie Gao MD  [facility]   yes

## 2017-09-08 NOTE — PROGRESS NOTES
Results discussed with patient during the clinic visit.     .Sweetie Gao MD.   Family Physician.  [facility].

## 2017-09-09 DIAGNOSIS — M17.9 OSTEOARTHRITIS OF KNEE, UNSPECIFIED LATERALITY, UNSPECIFIED OSTEOARTHRITIS TYPE: ICD-10-CM

## 2017-09-11 NOTE — TELEPHONE ENCOUNTER
diclofenac (VOLTAREN) 50 MG EC tablet      Last Written Prescription Date: 8/2/17  Last Quantity: 90, # refills: 0  Last Office Visit with [facility] prescribing provider: 9/8/17       Creatinine   Date Value Ref Range Status   02/06/2017 0.66 0.52 - 1.04 mg/dL Final     Lab Results   Component Value Date    AST 29 02/06/2017     Lab Results   Component Value Date    ALT 30 02/06/2017     BP Readings from Last 3 Encounters:   09/08/17 125/84   08/25/17 130/77   08/14/17 137/83

## 2017-09-12 NOTE — TELEPHONE ENCOUNTER
Called patient and left a message to return call and schedule an appointment for health maintenance items that are due. Final PM letter mailed.  Mari Allan CMA

## 2017-09-14 ENCOUNTER — APPOINTMENT (OUTPATIENT)
Dept: OPTOMETRY | Facility: CLINIC | Age: 62
End: 2017-09-14
Payer: COMMERCIAL

## 2017-09-14 ENCOUNTER — OFFICE VISIT (OUTPATIENT)
Dept: OPTOMETRY | Facility: CLINIC | Age: 62
End: 2017-09-14
Payer: COMMERCIAL

## 2017-09-14 DIAGNOSIS — H04.123 DRY EYES: ICD-10-CM

## 2017-09-14 DIAGNOSIS — H52.03 HYPEROPIA OF BOTH EYES WITH ASTIGMATISM AND PRESBYOPIA: ICD-10-CM

## 2017-09-14 DIAGNOSIS — H25.813 COMBINED FORMS OF AGE-RELATED CATARACT OF BOTH EYES: ICD-10-CM

## 2017-09-14 DIAGNOSIS — H52.4 HYPEROPIA OF BOTH EYES WITH ASTIGMATISM AND PRESBYOPIA: ICD-10-CM

## 2017-09-14 DIAGNOSIS — Z01.00 EXAMINATION OF EYES AND VISION: Primary | ICD-10-CM

## 2017-09-14 DIAGNOSIS — H52.203 HYPEROPIA OF BOTH EYES WITH ASTIGMATISM AND PRESBYOPIA: ICD-10-CM

## 2017-09-14 PROCEDURE — 92015 DETERMINE REFRACTIVE STATE: CPT | Performed by: OPTOMETRIST

## 2017-09-14 PROCEDURE — 92014 COMPRE OPH EXAM EST PT 1/>: CPT | Performed by: OPTOMETRIST

## 2017-09-14 PROCEDURE — 92341 FIT SPECTACLES BIFOCAL: CPT | Performed by: OPTOMETRIST

## 2017-09-14 ASSESSMENT — VISUAL ACUITY
CORRECTION_TYPE: GLASSES
OD_SC: 20/40
OS_CC: 20/40
OD_CC+: -1
OD_CC: 20/50
METHOD: SNELLEN - LINEAR
OS_SC: 20/25
OD_SC: 20/80
OD_CC: 20/40
OS_CC: 20/40
OS_SC: 20/80 -1

## 2017-09-14 ASSESSMENT — REFRACTION_MANIFEST
OD_ADD: +2.00
OS_ADD: +2.00
OS_CYLINDER: +0.25
OS_AXIS: 050
OD_CYLINDER: +0.25
OS_SPHERE: +1.00
OD_AXIS: 005
OD_SPHERE: +0.50

## 2017-09-14 ASSESSMENT — CONF VISUAL FIELD
OD_NORMAL: 1
OS_NORMAL: 1

## 2017-09-14 ASSESSMENT — REFRACTION_WEARINGRX
OD_AXIS: 170
SPECS_TYPE: SVL
OD_CYLINDER: +0.50
OS_CYLINDER: +0.75
OS_AXIS: 170
OS_SPHERE: +2.00
OD_SPHERE: +2.75

## 2017-09-14 ASSESSMENT — TONOMETRY
OS_IOP_MMHG: 16
IOP_METHOD: APPLANATION
OD_IOP_MMHG: 16

## 2017-09-14 ASSESSMENT — CUP TO DISC RATIO
OS_RATIO: 0.2
OD_RATIO: 0.2

## 2017-09-14 ASSESSMENT — EXTERNAL EXAM - RIGHT EYE: OD_EXAM: NORMAL

## 2017-09-14 ASSESSMENT — EXTERNAL EXAM - LEFT EYE: OS_EXAM: NORMAL

## 2017-09-14 ASSESSMENT — SLIT LAMP EXAM - LIDS
COMMENTS: NORMAL
COMMENTS: NORMAL

## 2017-09-14 NOTE — PROGRESS NOTES
Chief Complaint   Patient presents with     COMPREHENSIVE EYE EXAM      Accompanied by self  Last Eye Exam: 2.5 years ago  Dilated Previously: Yes    What are you currently using to see?  Glasses, lost newest pair        Distance Vision Acuity: Noticed gradual change in both eyes    Near Vision Acuity: Satisfied with vision while reading      Eye Comfort: good  Do you use eye drops? : No  Occupation or Hobbies: unemployed    Alisha Medrano, Optometric Tech          Medical, surgical and family histories reviewed and updated 9/14/2017.       OBJECTIVE: See Ophthalmology exam    ASSESSMENT:    ICD-10-CM    1. Examination of eyes and vision Z01.00 EYE EXAM (SIMPLE-NONBILLABLE)     REFRACTION   2. Hyperopia of both eyes with astigmatism and presbyopia H52.03 EYE EXAM (SIMPLE-NONBILLABLE)    H52.203 REFRACTION    H52.4    3. Combined forms of age-related cataract of both eyes H25.813 EYE EXAM (SIMPLE-NONBILLABLE)   4. Dry eyes H04.123 EYE EXAM (SIMPLE-NONBILLABLE)     polyethylene glycol 0.4%- propylene glycol 0.3% (SYSTANE ULTRA) 0.4-0.3 % SOLN ophthalmic solution      PLAN:   A final glasses prescription was given.  Allow time for adaptation to lenses.  You have the option of wearing reading glasses that would allow you to see up close (anytime you look far away, you have to take them off) or you can get a progressive lens or a lined bifocal, these would allow you to see clearly in the distance and close up.    Systane Ultra Lubricating Drops, 1 drop in each eye 2-4 times daily    Monitor cataracts by having yearly exams.  Wear sunglasses when outside.    Return to clinic in 1 year for your next eye exam.      Monique Dale O.D  [address]. [facility] [place]  [address]    [phone]

## 2017-09-14 NOTE — MR AVS SNAPSHOT
After Visit Summary   9/14/2017    Sanjuana Salamanca    MRN: [id number]           Patient Information     Date Of Birth          1955        Visit Information        Provider Department      9/14/2017 11:20 AM Monique Dale OD [facility]        Today's Diagnoses     Examination of eyes and vision    -  1    Hyperopia of both eyes with astigmatism and presbyopia        Combined forms of age-related cataract of both eyes        Dry eyes          Care Instructions      A final glasses prescription was given.  Allow time for adaptation to lenses.  You have the option of wearing reading glasses that would allow you to see up close (anytime you look far away, you have to take them off) or you can get a progressive lens or a lined bifocal, these would allow you to see clearly in the distance and close up.    Systane Ultra Lubricating Drops, 1 drop in each eye 2-4 times daily    Monitor cataracts by having yearly exams.  Wear sunglasses when outside.    Return to clinic in 1 year for your next eye exam.      Monique Dale O.D  [address] [place]  [address]    [phone]                               Follow-ups after your visit        Follow-up notes from your care team     Return in about 1 year (around 9/14/2018) for Eye Exam.      Who to contact     If you have questions or need follow up information about today's clinic visit or your schedule please contact [facility] directly at [phone].  Normal or non-critical lab and imaging results will be communicated to you by MyChart, letter or phone within 4 business days after the clinic has received the results. If you do not hear from us within 7 days, please contact the clinic through MyChart or phone. If you have a critical or abnormal lab result, we will notify you by phone as soon as possible.  Submit refill requests through [employer] or call your pharmacy and they will forward  "the refill request to us. Please allow 3 business days for your refill to be completed.          Additional Information About Your Visit        [employer] Information     [employer] lets you send messages to your doctor, view your test results, renew your prescriptions, schedule appointments and more. To sign up, go to [email or web address] . Click on \"Log in\" on the left side of the screen, which will take you to the Welcome page. Then click on \"Sign up Now\" on the right side of the page.     You will be asked to enter the access code listed below, as well as some personal information. Please follow the directions to create your username and password.     Your access code is: [email or web address]  Expires: 2017  4:05 PM     Your access code will  in 90 days. If you need help or a new code, please call your [facility] clinic or [phone].        Care EveryWhere ID     This is your Care EveryWhere ID. This could be used by other organizations to access your [facility] medical records  [id number]         Blood Pressure from Last 3 Encounters:   17 125/84   17 130/77   17 137/83    Weight from Last 3 Encounters:   17 76.2 kg (168 lb)   17 77.1 kg (170 lb)   17 76.7 kg (169 lb)              We Performed the Following     EYE EXAM (SIMPLE-NONBILLABLE)     REFRACTION          Today's Medication Changes          These changes are accurate as of: 17 12:35 PM.  If you have any questions, ask your nurse or doctor.               Start taking these medicines.        Dose/Directions    polyethylene glycol 0.4%- propylene glycol 0.3% 0.4-0.3 % Soln ophthalmic solution   Commonly known as:  SYSTANE ULTRA   Used for:  Dry eyes   Started by:  Monique Dale, OD        Dose:  1 drop   Place 1 drop into both eyes 4 times daily   Quantity:  1 Bottle   Refills:  12            Where to get your medicines      These medications were sent to [email or web address] [address] - [place], [place] - [address] AT [facility] [address]  [address], [facility] [address]     Phone:  [phone]     polyethylene glycol 0.4%- propylene glycol 0.3% 0.4-0.3 % Soln ophthalmic solution                Primary Care Provider Office Phone # Fax #    Sweetie Brayden Gao [phone] [address] [place], [place]. \[facility] Outlook.\"" [id number]        Equal Access to Services     ROSA SINCLAIR : Hadii aad ku hadasho Soomaali, waaxda luqadaha, qaybta kaalmada adeegyada, waxay idiin hayaan adeeg coltenaralalitha lajoon feliz. So [facility] [place] [facility] [phone].    ATENCIÓN: Si habla español, tiene a ross disposición servicios gratuitos de asistencia lingüística. [facility] [phone].    We comply with applicable federal civil rights laws and [place] laws. We do not discriminate on the basis of race, color, national origin, age, disability sex, sexual orientation or gender identity.            Thank you!     Thank you for choosing [facility]  for your care. Our goal is always to provide you with excellent care. Hearing back from our patients is one way we can continue to improve our services. Please take a few minutes to complete the written survey that you may receive in the mail after your visit with us. Thank you!             Your Updated Medication List - Protect others around you: Learn how to safely use, store and throw away your medicines at www.disposemymeds.org.          This list is accurate as of: 9/14/17 12:35 PM.  Always use your most recent med list.                   Brand Name Dispense Instructions for use Diagnosis    acetaminophen 325 MG tablet    TYLENOL    100 tablet    Take 1-2 tablets (325-650 mg) by mouth every 6 hours as needed for mild pain    Primary osteoarthritis of both knees       albuterol 108 (90 BASE) MCG/ACT Inhaler    PROAIR HFA/PROVENTIL HFA/VENTOLIN HFA    1 Inhaler    Inhale 2 puffs into the lungs every 6 hours as needed for shortness of breath / dyspnea or wheezing    Acute  bronchitis due to other specified organisms       benzonatate 100 MG capsule    TESSALON    42 capsule    Take 1 capsule (100 mg) by mouth 2 times daily as needed for cough    Cough       cholecalciferol 1000 UNIT tablet    vitamin D    100 tablet    Take 1 tablet (1,000 Units) by mouth daily    Avitaminosis D       diclofenac 50 MG EC tablet    VOLTAREN    90 tablet    TAKE 1 TABLET(50 MG) BY MOUTH THREE TIMES DAILY AS NEEDED FOR MODERATE PAIN    Osteoarthritis of knee, unspecified laterality, unspecified osteoarthritis type       guaiFENesin-dextromethorphan 100-10 MG/5ML syrup    ROBITUSSIN DM    560 mL    Take 5 mLs by mouth every 4 hours as needed for cough    Acute bronchitis due to other specified organisms       metoprolol 25 MG tablet    LOPRESSOR    90 tablet    Take 0.5 tablets (12.5 mg) by mouth 2 times daily    Hypertension goal BP (blood pressure) < 140/90       omeprazole 20 MG tablet     180 tablet    Take 2 tablets (40 mg) by mouth daily Take 30-60 minutes before a meal.    Gastroesophageal reflux disease, esophagitis presence not specified       order for DME     1 kit    Use as directed.    Essential hypertension with goal blood pressure less than 140/90       polyethylene glycol 0.4%- propylene glycol 0.3% 0.4-0.3 % Soln ophthalmic solution    SYSTANE ULTRA    1 Bottle    Place 1 drop into both eyes 4 times daily    Dry eyes       polyethylene glycol powder    MIRALAX    510 g    Take 17 g (1 capful) by mouth daily    Constipation, unspecified constipation type       selenium sulfide 2.5 % lotion    SELSUN    118 mL    Apply to affected area and lather with small amounts of water; leave on skin for 10 minutes, then rinse thoroughly; repeat once every day for 7 days for 8 weeks.    Tinea versicolor

## 2017-09-14 NOTE — PATIENT INSTRUCTIONS
A final glasses prescription was given.  Allow time for adaptation to lenses.  You have the option of wearing reading glasses that would allow you to see up close (anytime you look far away, you have to take them off) or you can get a progressive lens or a lined bifocal, these would allow you to see clearly in the distance and close up.    Systane Ultra Lubricating Drops, 1 drop in each eye 2-4 times daily    Monitor cataracts by having yearly exams.  Wear sunglasses when outside.    Return to clinic in 1 year for your next eye exam.      Monique Dale O.D  [address]  [place]  [address]    [phone]

## 2017-10-10 DIAGNOSIS — M17.9 OSTEOARTHRITIS OF KNEE, UNSPECIFIED LATERALITY, UNSPECIFIED OSTEOARTHRITIS TYPE: ICD-10-CM

## 2017-10-11 ENCOUNTER — OFFICE VISIT (OUTPATIENT)
Dept: FAMILY MEDICINE | Facility: CLINIC | Age: 62
End: 2017-10-11
Payer: COMMERCIAL

## 2017-10-11 VITALS
TEMPERATURE: 98.1 F | WEIGHT: 169 LBS | DIASTOLIC BLOOD PRESSURE: 79 MMHG | HEART RATE: 83 BPM | BODY MASS INDEX: 29.7 KG/M2 | OXYGEN SATURATION: 99 % | SYSTOLIC BLOOD PRESSURE: 124 MMHG

## 2017-10-11 DIAGNOSIS — R05.9 COUGH: Primary | ICD-10-CM

## 2017-10-11 PROCEDURE — 99213 OFFICE O/P EST LOW 20 MIN: CPT | Performed by: FAMILY MEDICINE

## 2017-10-11 RX ORDER — MONTELUKAST SODIUM 10 MG/1
10 TABLET ORAL AT BEDTIME
Qty: 90 TABLET | Refills: 0 | Status: SHIPPED | OUTPATIENT
Start: 2017-10-11 | End: 2017-12-15

## 2017-10-11 RX ORDER — CODEINE PHOSPHATE AND GUAIFENESIN 10; 100 MG/5ML; MG/5ML
1 SOLUTION ORAL EVERY 8 HOURS PRN
Qty: 120 ML | Refills: 0 | Status: SHIPPED | OUTPATIENT
Start: 2017-10-11 | End: 2017-11-10

## 2017-10-11 NOTE — MR AVS SNAPSHOT
After Visit Summary   10/11/2017    Sanjuana Salamanca    MRN: [id number]           Patient Information     Date Of Birth          1955        Visit Information        Provider Department      10/11/2017 3:00 PM Sweetie Gao MD [facility]        Today's Diagnoses     Cough    -  1       Follow-ups after your visit        Additional Services     ALLERGY/ASTHMA ADULT REFERRAL       Your provider has referred you to: [facility]: [facility] [phone]  [email or web address]    Please be aware that coverage of these services is subject to the terms and limitations of your health insurance plan.  Call member services at your health plan with any benefit or coverage questions.      Please bring the following with you to your appointment:    (1) Any X-Rays, CTs or MRIs which have been performed.  Contact the facility where they were done to arrange for  prior to your scheduled appointment.    (2) List of current medications  (3) This referral request   (4) Any documents/labs given to you for this referral            OTOLARYNGOLOGY REFERRAL       Your provider has referred you to: [facility]: [facility] [phone]   [email or web address]    Please be aware that coverage of these services is subject to the terms and limitations of your health insurance plan.  Call member services at your health plan with any benefit or coverage questions.      Please bring the following with you to your appointment:    (1) Any X-Rays, CTs or MRIs which have been performed.  Contact the facility where they were done to arrange for  prior to your scheduled appointment.   (2) List of current medications  (3) This referral request   (4) Any documents/labs given to you for this referral                  Who to contact     If you have questions or need follow up information about today's clinic visit or your  "schedule please contact [facility] directly at [phone].  Normal or non-critical lab and imaging results will be communicated to you by MyChart, letter or phone within 4 business days after the clinic has received the results. If you do not hear from us within 7 days, please contact the clinic through MyChart or phone. If you have a critical or abnormal lab result, we will notify you by phone as soon as possible.  Submit refill requests through avox or call your pharmacy and they will forward the refill request to us. Please allow 3 business days for your refill to be completed.          Additional Information About Your Visit        [employer] Information     avox lets you send messages to your doctor, view your test results, renew your prescriptions, schedule appointments and more. To sign up, go to [email or web address] . Click on \"Log in\" on the left side of the screen, which will take you to the Welcome page. Then click on \"Sign up Now\" on the right side of the page.     You will be asked to enter the access code listed below, as well as some personal information. Please follow the directions to create your username and password.     Your access code is: [email or web address]  Expires: 2017  4:05 PM     Your access code will  in 90 days. If you need help or a new code, please call your [place] clinic or [phone].        Care EveryWhere ID     This is your Care EveryWhere ID. This could be used by other organizations to access your [place] medical records  [id number]        Your Vitals Were     Pulse Temperature Pulse Oximetry BMI (Body Mass Index)          83 98.1  F (36.7  C) (Oral) 99% 29.7 kg/m2         Blood Pressure from Last 3 Encounters:   10/11/17 124/79   17 125/84   17 130/77    Weight from Last 3 Encounters:   10/11/17 169 lb (76.7 kg)   17 168 lb (76.2 kg)   17 170 lb (77.1 kg)              We Performed the Following     " ALLERGY/ASTHMA ADULT REFERRAL     OTOLARYNGOLOGY REFERRAL          Today's Medication Changes          These changes are accurate as of: 10/11/17  3:42 PM.  If you have any questions, ask your nurse or doctor.               Start taking these medicines.        Dose/Directions    guaiFENesin-codeine 100-10 MG/5ML Soln solution   Commonly known as:  ROBITUSSIN AC   Used for:  Cough   Started by:  Sweetie Gao MD        Dose:  1 tsp.   Take 5 mLs by mouth every 8 hours as needed for cough   Quantity:  120 mL   Refills:  0       montelukast 10 MG tablet   Commonly known as:  SINGULAIR   Used for:  Cough   Started by:  Sweetie Gao MD        Dose:  10 mg   Take 1 tablet (10 mg) by mouth At Bedtime   Quantity:  90 tablet   Refills:  0            Where to get your medicines      These medications were sent to [facility] [address] [place], [place] - [address] [facility] [address] [facility]  [address] [facility] [address]     Phone:  [phone]     montelukast 10 MG tablet         Some of these will need a paper prescription and others can be bought over the counter.  Ask your nurse if you have questions.     Bring a paper prescription for each of these medications     guaiFENesin-codeine 100-10 MG/5ML Soln solution                Primary Care Provider Office Phone # Fax #    Sweetie aGo [phone] [address] [facility] [address]        Equal Access to Services     ROSA SINCLAIR AH: Hadii sravanthi ku hadasho Soomaali, waaxda luqadaha, qaybta kaalmada adeegyada, waxay abida boucher adedenver feliz. So [facility] [phone].    ATENCIÓN: Si habla español, tiene a ross disposición servicios gratuitos de asistencia lingüística. Llame al [phone].    We comply with applicable federal civil rights laws and [place] laws. We do not discriminate on the basis of race, color, national origin, age, disability, sex, sexual orientation,  or gender identity.            Thank you!     Thank you for choosing [facility]  for your care. Our goal is always to provide you with excellent care. Hearing back from our patients is one way we can continue to improve our services. Please take a few minutes to complete the written survey that you may receive in the mail after your visit with us. Thank you!             Your Updated Medication List - Protect others around you: Learn how to safely use, store and throw away your medicines at www.disposemymeds.org.          This list is accurate as of: 10/11/17  3:42 PM.  Always use your most recent med list.                   Brand Name Dispense Instructions for use Diagnosis    acetaminophen 325 MG tablet    TYLENOL    100 tablet    Take 1-2 tablets (325-650 mg) by mouth every 6 hours as needed for mild pain    Primary osteoarthritis of both knees       albuterol 108 (90 BASE) MCG/ACT Inhaler    PROAIR HFA/PROVENTIL HFA/VENTOLIN HFA    1 Inhaler    Inhale 2 puffs into the lungs every 6 hours as needed for shortness of breath / dyspnea or wheezing    Acute bronchitis due to other specified organisms       cholecalciferol 1000 UNIT tablet    vitamin D    100 tablet    Take 1 tablet (1,000 Units) by mouth daily    Avitaminosis D       diclofenac 50 MG EC tablet    VOLTAREN    90 tablet    TAKE 1 TABLET(50 MG) BY MOUTH THREE TIMES DAILY AS NEEDED FOR MODERATE PAIN    Osteoarthritis of knee, unspecified laterality, unspecified osteoarthritis type       guaiFENesin-codeine 100-10 MG/5ML Soln solution    ROBITUSSIN AC    120 mL    Take 5 mLs by mouth every 8 hours as needed for cough    Cough       metoprolol 25 MG tablet    LOPRESSOR    90 tablet    Take 0.5 tablets (12.5 mg) by mouth 2 times daily    Hypertension goal BP (blood pressure) < 140/90       montelukast 10 MG tablet    SINGULAIR    90 tablet    Take 1 tablet (10 mg) by mouth At Bedtime    Cough       MULTI COMPLETE PO           omeprazole  20 MG tablet     180 tablet    Take 2 tablets (40 mg) by mouth daily Take 30-60 minutes before a meal.    Gastroesophageal reflux disease, esophagitis presence not specified       order for DME     1 kit    Use as directed.    Essential hypertension with goal blood pressure less than 140/90       polyethylene glycol powder    MIRALAX    510 g    Take 17 g (1 capful) by mouth daily    Constipation, unspecified constipation type       selenium sulfide 2.5 % lotion    SELSUN    118 mL    Apply to affected area and lather with small amounts of water; leave on skin for 10 minutes, then rinse thoroughly; repeat once every day for 7 days for 8 weeks.    Tinea versicolor

## 2017-10-11 NOTE — PROGRESS NOTES
SUBJECTIVE:   Sanjuana Salamanca is a 62 year old female who presents to clinic today for the following health issues:    ENT Symptoms             Symptoms: cc Present Absent Comment   Fever/Chills   x    Fatigue   x    Muscle Aches   x    Eye Irritation   x    Sneezing  x     Nasal Nicholas/Drg   x    Sinus Pressure/Pain   x    Loss of smell   x    Dental pain   x    Sore Throat  x     Swollen Glands   x    Ear Pain/Fullness   x    Cough   x    Wheeze   x    Chest Pain   x    Shortness of breath   x    Rash   x    Other         Symptom duration:   X long time    Symptom severity:  moderate   Treatments tried:  none   Contacts:  none     She was referred to ENT and allergy specialist. She lost the [employer] papers.   She took tessalon pearls that did not help.   She was on [place] for 6 days, she was doing better however when she came back to [place] her cough started again.     At this point she is not taking any OTC medications for cough.     Problem list and histories reviewed & adjusted, as indicated.  Additional history: as documented    Patient Active Problem List   Diagnosis     Avitaminosis D     Esophageal reflux     OA (osteoarthritis) of knee     Advanced directives, counseling/discussion     Hyperlipidemia LDL goal <160     Cataracts, both eyes     Dry eyes     Hypokalemia     Prediabetes     Essential hypertension with goal blood pressure less than 140/90     History reviewed. No pertinent surgical history.    Social History   Substance Use Topics     Smoking status: Never Smoker     Smokeless tobacco: Never Used     Alcohol use No     Family History   Problem Relation Age of Onset     Hypertension Mother      Thyroid Disease Mother      DIABETES Other      CEREBROVASCULAR DISEASE Other      Glaucoma No family hx of      Macular Degeneration No family hx of      CANCER No family hx of          Current Outpatient Prescriptions   Medication Sig Dispense Refill     Multiple Vitamins-Minerals (MULTI COMPLETE PO)         diclofenac (VOLTAREN) 50 MG EC tablet TAKE 1 TABLET(50 MG) BY MOUTH THREE TIMES DAILY AS NEEDED FOR MODERATE PAIN 90 tablet 0     selenium sulfide (SELSUN) 2.5 % lotion Apply to affected area and lather with small amounts of water; leave on skin for 10 minutes, then rinse thoroughly; repeat once every day for 7 days for 8 weeks. 118 mL 1     albuterol (PROAIR HFA/PROVENTIL HFA/VENTOLIN HFA) 108 (90 BASE) MCG/ACT Inhaler Inhale 2 puffs into the lungs every 6 hours as needed for shortness of breath / dyspnea or wheezing 1 Inhaler 0     cholecalciferol (VITAMIN D3) 1000 UNIT tablet Take 1 tablet (1,000 Units) by mouth daily 100 tablet 3     order for DME Use as directed. 1 kit 0     metoprolol (LOPRESSOR) 25 MG tablet Take 0.5 tablets (12.5 mg) by mouth 2 times daily 90 tablet 1     acetaminophen (TYLENOL) 325 MG tablet Take 1-2 tablets (325-650 mg) by mouth every 6 hours as needed for mild pain 100 tablet 3     omeprazole 20 MG tablet Take 2 tablets (40 mg) by mouth daily Take 30-60 minutes before a meal. 180 tablet 1     polyethylene glycol (MIRALAX) powder Take 17 g (1 capful) by mouth daily 510 g 1     BP Readings from Last 3 Encounters:   10/11/17 124/79   09/08/17 125/84   08/25/17 130/77    Wt Readings from Last 3 Encounters:   10/11/17 169 lb (76.7 kg)   09/08/17 168 lb (76.2 kg)   08/25/17 170 lb (77.1 kg)                  Labs reviewed in EPIC        Reviewed and updated as needed this visit by clinical staffTobacco  Allergies  Meds  Med Hx  Surg Hx  Fam Hx  Soc Hx      Reviewed and updated as needed this visit by Provider         ROS:  Constitutional, HEENT, cardiovascular, pulmonary, gi and gu systems are negative, except as otherwise noted.      OBJECTIVE:   /79 (BP Location: Right arm, Patient Position: Sitting, Cuff Size: Adult Regular)  Pulse 83  Temp 98.1  F (36.7  C) (Oral)  Wt 169 lb (76.7 kg)  SpO2 99%  BMI 29.7 kg/m2  Body mass index is 29.7 kg/(m^2).  GENERAL: healthy, alert and  no distress  HENT: ear canals and TM's normal, nose and mouth without ulcers or lesions  NECK: no adenopathy, no asymmetry, masses, or scars and thyroid normal to palpation  RESP: lungs clear to auscultation - no rales, rhonchi or wheezes  CV: regular rate and rhythm, normal S1 S2, no S3 or S4, no murmur, click or rub, no peripheral edema and peripheral pulses strong    ASSESSMENT/PLAN:       ICD-10-CM    1. Cough R05 montelukast (SINGULAIR) 10 MG tablet     guaiFENesin-codeine (ROBITUSSIN AC) 100-10 MG/5ML SOLN solution     ALLERGY/ASTHMA ADULT REFERRAL     OTOLARYNGOLOGY REFERRAL     Her ENT exam with mild nasal congestion. Lungs are clear.   As above, strongly encouraged to get seen by ENT and allergy medicine as referred.       Sweetie Gao MD  [facility]

## 2017-10-11 NOTE — NURSING NOTE
"Chief Complaint   Patient presents with     Cough     x long time        Initial /79 (BP Location: Right arm, Patient Position: Sitting, Cuff Size: Adult Regular)  Pulse 83  Temp 98.1  F (36.7  C) (Oral)  Wt 169 lb (76.7 kg)  SpO2 99%  BMI 29.7 kg/m2 Estimated body mass index is 29.7 kg/(m^2) as calculated from the following:    Height as of 5/17/17: 5' 3.25\" (1.607 m).    Weight as of this encounter: 169 lb (76.7 kg).  Medication Reconciliation: complete  Fede Castellanos MA    "

## 2017-10-11 NOTE — TELEPHONE ENCOUNTER
diclofenac (VOLTAREN) 50 MG EC tablet      Last Written Prescription Date: 9-11-17  Last Quantity: 90, # refills: 0  Last Office Visit with G, P or [facility] prescribing provider: 9-8-17  Next 5 appointments (look out 90 days)     Oct 11, 2017  3:00 PM CDT   SHORT with Sweetie Gao MD   [facility] ([facility])    [address] [place] [id number] [phone]                   Creatinine   Date Value Ref Range Status   02/06/2017 0.66 0.52 - 1.04 mg/dL Final     Lab Results   Component Value Date    AST 29 02/06/2017     Lab Results   Component Value Date    ALT 30 02/06/2017     BP Readings from Last 3 Encounters:   09/08/17 125/84   08/25/17 130/77   08/14/17 137/83

## 2017-10-12 NOTE — TELEPHONE ENCOUNTER
Prescription approved per [facility] Refill Protocol.  Taylor Michaud, RN CPC Triage.

## 2017-10-16 ENCOUNTER — OFFICE VISIT (OUTPATIENT)
Dept: ALLERGY | Facility: CLINIC | Age: 62
End: 2017-10-16
Payer: COMMERCIAL

## 2017-10-16 VITALS
SYSTOLIC BLOOD PRESSURE: 179 MMHG | HEART RATE: 92 BPM | WEIGHT: 170.2 LBS | BODY MASS INDEX: 29.91 KG/M2 | OXYGEN SATURATION: 96 % | DIASTOLIC BLOOD PRESSURE: 100 MMHG

## 2017-10-16 DIAGNOSIS — R05.9 COUGH: Primary | ICD-10-CM

## 2017-10-16 LAB
FEF 25/75: NORMAL
FEV-1: NORMAL
FEV1/FVC: NORMAL
FVC: NORMAL

## 2017-10-16 PROCEDURE — 94060 EVALUATION OF WHEEZING: CPT | Performed by: ALLERGY & IMMUNOLOGY

## 2017-10-16 PROCEDURE — 99244 OFF/OP CNSLTJ NEW/EST MOD 40: CPT | Mod: 25 | Performed by: ALLERGY & IMMUNOLOGY

## 2017-10-16 RX ORDER — ALBUTEROL SULFATE 90 UG/1
2 AEROSOL, METERED RESPIRATORY (INHALATION) EVERY 4 HOURS PRN
Qty: 1 INHALER | Refills: 1 | Status: SHIPPED | OUTPATIENT
Start: 2017-10-16 | End: 2018-01-05

## 2017-10-16 RX ORDER — FLUTICASONE PROPIONATE 110 UG/1
2 AEROSOL, METERED RESPIRATORY (INHALATION) 2 TIMES DAILY
Qty: 1 INHALER | Refills: 1 | Status: SHIPPED | OUTPATIENT
Start: 2017-10-16 | End: 2017-12-21

## 2017-10-16 RX ORDER — PREDNISONE 20 MG/1
20 TABLET ORAL 2 TIMES DAILY
Qty: 10 TABLET | Refills: 0 | Status: SHIPPED | OUTPATIENT
Start: 2017-10-16 | End: 2017-11-10

## 2017-10-16 NOTE — PROGRESS NOTES
"Dear Sweetie Gao MD,    Thank you for referring your patient Sanjuana Salamanca to the Allergy/Immunology Clinic. Sanjuana Salamanca was seen in the Allergy Clinic at [facility]. The following are my recommendations regarding her Cough    1. Begin prednisone 20mg twice daily x 5 days  2. Begin flovent 110mcg 2 puffs twice daily  3. Continue albuterol HFA, 2-4 puffs every 4 hours as needed  4. Optichamber given in clinic, appropriate inhaler and spacer technique reviewed  5. Follow-up in 1 month      Sanjuana Salamanca is a 62 year old female being seen today in consultation for cough. She states she has had the cough for several years. Her symptoms wax and wane in severity. Sanjuana reports that this year she has had 2 prolonged periods of time with frequent coughing. Typically she states she gets a \"bad episode\" once per year but it does not consistently occur in the same season. Sanjuana has sought evaluation for her symptoms and has been treated with various medications which often help. Her current symptoms have been ongoing for the past 3 months and some days are better than others. Sanjuana does occasionally wake up from sleep due to the cough but generally her symptoms occur during the day. The cough is dry and she does not have any sputum production. Sanjuana denies fevers, weight loss, or change in appetite. When the cough began she did have a cold with associated symptoms of sneezing and rhinorrhea but these symptoms have since resolved. Sanjuana reports the cough began 2 or 3 days after she got the cold. She has associated wheezing at times but denies shortness of breath. Sanjuana feels the cough may be triggered by something being stuck or irritating her upper airway. Over the last few months Sanjuana has taken azithromycin, albuterol, cough syrup and has been taking montelukast for the past week. She has not been prescribed prednisone for her current symptoms.    Sanjuana reports having a headache today. She " did not take her blood pressure medications before leaving home today and attributes her headache to her elevated blood pressure. She denies chest elvie/pressure or shortness of breath.      Past Medical History:   Diagnosis Date     Arthritis      Cataracts, both eyes 3/19/2015     Hypertension      Family History   Problem Relation Age of Onset     Hypertension Mother      Thyroid Disease Mother      DIABETES Other      CEREBROVASCULAR DISEASE Other      Glaucoma No family hx of      Macular Degeneration No family hx of      CANCER No family hx of      History reviewed. No pertinent surgical history.    ENVIRONMENTAL HISTORY: [place] The home is heated with a . They does have central air conditioning. The patient's bedroom is furnished with carpeting in bedroom and fabric window coverings.  Pets inside the house include None. There is not history of cockroach or mice infestation. There is/are 0 smokers in the house.  The house does not have a damp basement.     SOCIAL HISTORY:   Sanjuana is not employed right now. She lives with her daughter.     REVIEW OF SYSTEMS:  General: negative for weight gain. negative for weight loss. negative for changes in sleep.   Eyes: positive  for itching. positive  for redness. positive  for tearing/watering.  Ears: negative for fullness. negative for hearing loss. negative for dizziness.   Nose: negative for snoring.negative for changes in smell. negative for drainage.   Throat: negative for hoarseness. negative for sore throat. negative for trouble swallowing.   Lungs: negative for shortness of breath.negative for wheezing. negative for sputum production.   Cardiovascular: negative for chest pain. negative for swelling of ankles. negative for fast or irregular heartbeat.   Gastrointestinal: negative for nausea. negative for heartburn. negative for acid reflux.   Musculoskeletal: negative for joint pain. negative for joint stiffness.  negative for joint swelling.   Neurologic: negative for seizures. negative for fainting. negative for weakness.   Psychiatric: negative for changes in mood. negative for anxiety.   Endocrine: negative for cold intolerance. negative for heat intolerance. negative for tremors.   Hematologic: negative for easy bruising. negative for easy bleeding.  Integumentary: negative for rash. negative for scaling. negative for nail changes.       Current Outpatient Prescriptions:      diclofenac (VOLTAREN) 50 MG EC tablet, TAKE 1 TABLET(50 MG) BY MOUTH THREE TIMES DAILY AS NEEDED FOR MODERATE PAIN, Disp: 90 tablet, Rfl: 0     Multiple Vitamins-Minerals (MULTI COMPLETE PO), , Disp: , Rfl:      montelukast (SINGULAIR) 10 MG tablet, Take 1 tablet (10 mg) by mouth At Bedtime, Disp: 90 tablet, Rfl: 0     guaiFENesin-codeine (ROBITUSSIN AC) 100-10 MG/5ML SOLN solution, Take 5 mLs by mouth every 8 hours as needed for cough, Disp: 120 mL, Rfl: 0     selenium sulfide (SELSUN) 2.5 % lotion, Apply to affected area and lather with small amounts of water; leave on skin for 10 minutes, then rinse thoroughly; repeat once every day for 7 days for 8 weeks., Disp: 118 mL, Rfl: 1     albuterol (PROAIR HFA/PROVENTIL HFA/VENTOLIN HFA) 108 (90 BASE) MCG/ACT Inhaler, Inhale 2 puffs into the lungs every 6 hours as needed for shortness of breath / dyspnea or wheezing, Disp: 1 Inhaler, Rfl: 0     cholecalciferol (VITAMIN D3) 1000 UNIT tablet, Take 1 tablet (1,000 Units) by mouth daily, Disp: 100 tablet, Rfl: 3     order for DME, Use as directed., Disp: 1 kit, Rfl: 0     metoprolol (LOPRESSOR) 25 MG tablet, Take 0.5 tablets (12.5 mg) by mouth 2 times daily, Disp: 90 tablet, Rfl: 1     acetaminophen (TYLENOL) 325 MG tablet, Take 1-2 tablets (325-650 mg) by mouth every 6 hours as needed for mild pain, Disp: 100 tablet, Rfl: 3     omeprazole 20 MG tablet, Take 2 tablets (40 mg) by mouth daily Take 30-60 minutes before a meal., Disp: 180 tablet, Rfl: 1      polyethylene glycol (MIRALAX) powder, Take 17 g (1 capful) by mouth daily, Disp: 510 g, Rfl: 1    There is no immunization history on file for this patient.  Allergies   Allergen Reactions     Valsartan Cough         EXAM:   BP (!) 179/100  Pulse 92  Wt 77.2 kg (170 lb 3.2 oz)  SpO2 96%  BMI 29.91 kg/m2  GENERAL APPEARANCE: alert, cooperative and not in distress  SKIN: no rashes, no lesions  HEAD: atraumatic, normocephalic  EYES: lids and lashes normal, conjunctivae and sclerae clear, pupils equal, round, reactive to light, EOM full and intact  ENT: no scars or lesions, nasal exam showed no discharge, swelling or lesions noted, otoscopy showed external auditory canals clear, tympanic membranes normal, tongue midline and normal, soft palate, uvula, and tonsils normal  NECK: no asymmetry, masses, or scars, supple without significant adenopathy  LUNGS: unlabored respirations, no intercostal retractions or accessory muscle use, few wheezes heard throughout all lung fields, coughing during exam  HEART: regular rate and rhythm without murmurs and normal S1 and S2  MUSCULOSKELETAL: no musculoskeletal defects are noted  NEURO: no focal deficits noted  PSYCH: does not appear depressed or anxious    WORKUP: Spirometry  SPIROMETRY  initial FVC 1.51L (60% of predicted); after bronchodilator 1.80L (20% change)   initial FEV1 1.36L (69% of predicted); after bronchodilator 1.63L (20% change)  initial FEV1/FVC 90%; after bronchodilator 91%  initial FEF 25%-75% 2.36L/s (122% of predicted); after bronchodilator 2.66L/s (13% change)    These values are consistent with mixed airflow obstruction and restriction. There was significant improvement in FVC and FEV1 after bronchodilator administration.    ASSESSMENT/PLAN:  Sanjuana Salamanca is a 62 year old female here for evaluation of chronic cough. Her current symptoms have been ongoing for the past 3 months but she has had recurrent cough symptoms over the past several years. Her symptoms  and spirometry improved after bronchodilator administration in clinic. Given the pattern and type of symptoms we discussed that she may have asthma and will need long-term management to prevent recurrent symptoms.    1. Begin prednisone 20mg twice daily x 5 days  2. Begin flovent 110mcg 2 puffs twice daily  3. Continue albuterol HFA, 2-4 puffs every 4 hours as needed  4. Optichamber given in clinic, appropriate inhaler and spacer technique reviewed  5. Follow-up in 1 month      Juani Guerrero MD  Allergy/Immunology  [facility] and [place], [place]      Chart documentation done in part with Dragon Voice Recognition Software. Although reviewed after completion, some word and grammatical errors may remain.

## 2017-10-16 NOTE — PATIENT INSTRUCTIONS
If you have any questions regarding your allergies, asthma, or what we discussed during your visit today please call the allergy clinic or contact us via [employer].    [facility]: [phone]      Start taking the flovent (orange) inhaler every day - 2 puffs in the morning and 2 puffs again in the evening. Use this inhaler with the spacer.    Use the albuterol/ventolin (blue) inhaler every 4 hours as needed for cough, shortness of breath, or wheezing    Take the prednisone tablets twice a day for 5 days    Follow-up in 1 month        Using an Inhaler with a Spacer  To control asthma, you need to use your medicines the right way. Some medicines are inhaled using a device called a metered-dose inhaler (MDI). Metered-dose inhalers deliver medicine with a fine spray. You may be asked to use a spacer (holding tube) with your inhaler. The spacer helps make sure all the medicine you need goes into your lungs.   Steps for using an inhaler with a spacer  Step 1:    Remove the caps from the inhaler and spacer.    Shake the inhaler well and attach the spacer. If the inhaler is being used for the first time or has not been used for a while, prime it as directed by the product maker.  Step 2:    Breathe out normally.    Put the spacer between your teeth. Close your lips tightly around it.    Keep your chin up.  Step 3:    Spray 1 puff into the spacer by pressing down on the inhaler.    Then breathe in through your mouth as slowly and deeply as you can. This should take about 5-10 seconds. If you breathe too quickly, you may hear a whistling sound in certain spacers.  Step 4:    Take the spacer out of your mouth.    Hold your breath for a count of 10.    Then hold your lips together and slowly breathe out through your mouth.          If you re prescribed more than 1 puff of medicine at a time, wait at least 30 seconds between puffs. This number may be different for different medicines. Shake the inhaler again. Then  repeat steps 2 to 4.   Date Last Reviewed: 10/1/2016    2882-1444 [employer]. [address] [place] [address], [place] [address]. All rights reserved. This information is not intended as a substitute for professional medical care. Always follow your healthcare professional's instructions.

## 2017-10-16 NOTE — MR AVS SNAPSHOT
After Visit Summary   10/16/2017    Sanjuana Salamanca    MRN: [id number]           Patient Information     Date Of Birth          1955        Visit Information        Provider Department      10/16/2017 1:00 PM Juani Guerrero MD [facility]        Today's Diagnoses     Cough    -  1      Care Instructions    If you have any questions regarding your allergies, asthma, or what we discussed during your visit today please call the allergy clinic or contact us via [employer].    [facility]: [phone]      Start taking the flovent (orange) inhaler every day - 2 puffs in the morning and 2 puffs again in the evening. Use this inhaler with the spacer.    Use the albuterol/ventolin (blue) inhaler every 4 hours as needed for cough, shortness of breath, or wheezing    Take the prednisone tablets twice a day for 5 days    Follow-up in 1 month        Using an Inhaler with a Spacer  To control asthma, you need to use your medicines the right way. Some medicines are inhaled using a device called a metered-dose inhaler (MDI). Metered-dose inhalers deliver medicine with a fine spray. You may be asked to use a spacer (holding tube) with your inhaler. The spacer helps make sure all the medicine you need goes into your lungs.   Steps for using an inhaler with a spacer  Step 1:    Remove the caps from the inhaler and spacer.    Shake the inhaler well and attach the spacer. If the inhaler is being used for the first time or has not been used for a while, prime it as directed by the product maker.  Step 2:    Breathe out normally.    Put the spacer between your teeth. Close your lips tightly around it.    Keep your chin up.  Step 3:    Spray 1 puff into the spacer by pressing down on the inhaler.    Then breathe in through your mouth as slowly and deeply as you can. This should take about 5-10 seconds. If you breathe too quickly, you may hear a whistling sound in certain spacers.  Step 4:    Take the  "spacer out of your mouth.    Hold your breath for a count of 10.    Then hold your lips together and slowly breathe out through your mouth.          If you re prescribed more than 1 puff of medicine at a time, wait at least 30 seconds between puffs. This number may be different for different medicines. Shake the inhaler again. Then repeat steps 2 to 4.   Date Last Reviewed: 10/1/2016    9511-0652 [employer]. [address] [place] [address], [place] [address]. All rights reserved. This information is not intended as a substitute for professional medical care. Always follow your healthcare professional's instructions.          Follow-ups after your visit        Who to contact     If you have questions or need follow up information about today's clinic visit or your schedule please contact [facility] directly at [phone].  Normal or non-critical lab and imaging results will be communicated to you by [employer] Inchart, letter or phone within 4 business days after the clinic has received the results. If you do not hear from us within 7 days, please contact the clinic through [employer] Inchart or phone. If you have a critical or abnormal lab result, we will notify you by phone as soon as possible.  Submit refill requests through [employer] or call your pharmacy and they will forward the refill request to us. Please allow 3 business days for your refill to be completed.          Additional Information About Your Visit        [employer] Information     [employer] lets you send messages to your doctor, view your test results, renew your prescriptions, schedule appointments and more. To sign up, go to [email or web address] . Click on \"Log in\" on the left side of the screen, which will take you to the Welcome page. Then click on \"Sign up Now\" on the right side of the page.     You will be asked to enter the access code listed below, as well as some personal information. Please follow the directions to create your username and " password.     Your access code is: [email or web address]  Expires: 2017  4:05 PM     Your access code will  in 90 days. If you need help or a new code, please call your [place] clinic or [phone].        Care EveryWhere ID     This is your Care EveryWhere ID. This could be used by other organizations to access your [place] medical records  [id number]        Your Vitals Were     Pulse Pulse Oximetry BMI (Body Mass Index)             92 96% 29.91 kg/m2          Blood Pressure from Last 3 Encounters:   10/16/17 (!) 179/100   10/11/17 124/79   17 125/84    Weight from Last 3 Encounters:   10/16/17 77.2 kg (170 lb 3.2 oz)   10/11/17 76.7 kg (169 lb)   17 76.2 kg (168 lb)              We Performed the Following     Spirometry, Breathing Capacity          Today's Medication Changes          These changes are accurate as of: 10/16/17  2:47 PM.  If you have any questions, ask your nurse or doctor.               Start taking these medicines.        Dose/Directions    fluticasone 110 MCG/ACT Inhaler   Commonly known as:  FLOVENT HFA   Used for:  Cough   Started by:  Juani Guerrero MD        Dose:  2 puff   Inhale 2 puffs into the lungs 2 times daily   Quantity:  1 Inhaler   Refills:  1       predniSONE 20 MG tablet   Commonly known as:  DELTASONE   Used for:  Cough   Started by:  Juani Guerrero MD        Dose:  20 mg   Take 1 tablet (20 mg) by mouth 2 times daily   Quantity:  10 tablet   Refills:  0         These medicines have changed or have updated prescriptions.        Dose/Directions    albuterol 108 (90 BASE) MCG/ACT Inhaler   Commonly known as:  PROAIR HFA/PROVENTIL HFA/VENTOLIN HFA   This may have changed:    - when to take this  - reasons to take this   Used for:  Cough   Changed by:  Juani Guerrero MD        Dose:  2 puff   Inhale 2 puffs into the lungs every 4 hours as needed   Quantity:  1 Inhaler   Refills:  1            Where to get your medicines      These medications were sent to [facility]  D[facility] [address] [facility] [address] [facility] [address]  [address] [facility] [address]     Phone:  [phone]     albuterol 108 (90 BASE) MCG/ACT Inhaler    fluticasone 110 MCG/ACT Inhaler    predniSONE 20 MG tablet                Primary Care Provider Office Phone # Fax #    Sweetie rBayden Gao [phone] [address] [facility] [address]        Equal Access to Services     ED [facility] : Hadii aad ku hadasho Soomaali, waaxda luqadaha, qaybta kaalmada adeegyada, waxay joãoin haysammy ley . So [facility] [phone].    ATENCIÓN: Si habla español, tiene a ross disposición servicios gratuitos de asistencia lingüística. [facility] [phone].    We comply with applicable federal civil rights laws and [place] laws. We do not discriminate on the basis of race, color, national origin, age, disability, sex, sexual orientation, or gender identity.            Thank you!     Thank you for choosing [facility]  for your care. Our goal is always to provide you with excellent care. Hearing back from our patients is one way we can continue to improve our services. Please take a few minutes to complete the written survey that you may receive in the mail after your visit with us. Thank you!             Your Updated Medication List - Protect others around you: Learn how to safely use, store and throw away your medicines at www.disposemymeds.org.          This list is accurate as of: 10/16/17  2:47 PM.  Always use your most recent med list.                   Brand Name Dispense Instructions for use Diagnosis    acetaminophen 325 MG tablet    TYLENOL    100 tablet    Take 1-2 tablets (325-650 mg) by mouth every 6 hours as needed for mild pain    Primary osteoarthritis of both knees       albuterol 108 (90 BASE) MCG/ACT Inhaler    PROAIR HFA/PROVENTIL HFA/VENTOLIN HFA    1 Inhaler    Inhale 2 puffs into the lungs every 4 hours  as needed    Cough       cholecalciferol 1000 UNIT tablet    vitamin D    100 tablet    Take 1 tablet (1,000 Units) by mouth daily    Avitaminosis D       diclofenac 50 MG EC tablet    VOLTAREN    90 tablet    TAKE 1 TABLET(50 MG) BY MOUTH THREE TIMES DAILY AS NEEDED FOR MODERATE PAIN    Osteoarthritis of knee, unspecified laterality, unspecified osteoarthritis type       fluticasone 110 MCG/ACT Inhaler    FLOVENT HFA    1 Inhaler    Inhale 2 puffs into the lungs 2 times daily    Cough       guaiFENesin-codeine 100-10 MG/5ML Soln solution    ROBITUSSIN AC    120 mL    Take 5 mLs by mouth every 8 hours as needed for cough    Cough       metoprolol 25 MG tablet    LOPRESSOR    90 tablet    Take 0.5 tablets (12.5 mg) by mouth 2 times daily    Hypertension goal BP (blood pressure) < 140/90       montelukast 10 MG tablet    SINGULAIR    90 tablet    Take 1 tablet (10 mg) by mouth At Bedtime    Cough       MULTI COMPLETE PO           omeprazole 20 MG tablet     180 tablet    Take 2 tablets (40 mg) by mouth daily Take 30-60 minutes before a meal.    Gastroesophageal reflux disease, esophagitis presence not specified       order for DME     1 kit    Use as directed.    Essential hypertension with goal blood pressure less than 140/90       polyethylene glycol powder    MIRALAX    510 g    Take 17 g (1 capful) by mouth daily    Constipation, unspecified constipation type       predniSONE 20 MG tablet    DELTASONE    10 tablet    Take 1 tablet (20 mg) by mouth 2 times daily    Cough       selenium sulfide 2.5 % lotion    SELSUN    118 mL    Apply to affected area and lather with small amounts of water; leave on skin for 10 minutes, then rinse thoroughly; repeat once every day for 7 days for 8 weeks.    Tinea versicolor

## 2017-10-16 NOTE — NURSING NOTE
The following nebulizer treatment was given:     MEDICATION: Albuterol Sulfate 2.5 mg  : [place]  LOT #: [id number]  EXPIRATION DATE:  03/31/2019  NDC # 0550-1666-34    Ambreen Tan MA.... 2:07 PM....10/16/2017

## 2017-10-16 NOTE — NURSING NOTE
"Chief Complaint   Patient presents with     Consult     coughing       Initial BP (!) 179/100  Pulse 92  Wt 77.2 kg (170 lb 3.2 oz)  SpO2 96%  BMI 29.91 kg/m2 Estimated body mass index is 29.91 kg/(m^2) as calculated from the following:    Height as of 5/17/17: 1.607 m (5' 3.25\").    Weight as of this encounter: 77.2 kg (170 lb 3.2 oz).  Medication Reconciliation: complete   Ambreen Tan MA.... 1:10 PM....10/16/2017      "

## 2017-10-22 ENCOUNTER — HEALTH MAINTENANCE LETTER (OUTPATIENT)
Age: 62
End: 2017-10-22

## 2017-11-08 ENCOUNTER — TELEPHONE (OUTPATIENT)
Dept: FAMILY MEDICINE | Facility: CLINIC | Age: 62
End: 2017-11-08

## 2017-11-08 NOTE — TELEPHONE ENCOUNTER
Reason for Call:  Other appointment    Detailed comments: Patient calling in stating she would like to see Dr. Gao before she leaves the country on Saturday.   She states she has a bad cough.  Please call her to let her know if Dr. Gao can squeeze her in on Friday.    Phone Number Patient can be reached at: Cell number on file:    Telephone Information:   Mobile [phone]       Best Time: anytime    Can we leave a detailed message on this number? YES    Call taken on 11/8/2017 at 3:01 PM by Indira Roach

## 2017-11-08 NOTE — TELEPHONE ENCOUNTER
"Provider's schedule is 100% full and she is out of office.  She has an allergy appt on 11/16?   She is leaving the country on Saturday?    I called patient and strongly encouraged her to schedule with someone else as Dr. Gao is full on Friday and I am not sure if she will check messages before she is back.   I also advised her of her appt on 11/16.   She asked me to cancel that as she will be out of the country for 2 months, leaving this Saturday.      She is talking easily, mild language barrier but notes indicate no  needed.    She indicates her cough is chronic and that Dr. Gao has told her in the past that her BP is elevated due to her chronic cough.   She wants to see Dr. Gao only so she can get cough medicine to last her through her trip.   She says Dr. Gao has told her she would \"always\" be able to add her on to be seen if needed so is confident Dr. Gao will see her Friday and she will wait for us to call her back to tell her what time.    Routed to Dr. Gao to advise on adding patient on to schedule for chronic cough on Friday, 11/10.    Kim Reardon RN  [facility]      "

## 2017-11-09 NOTE — TELEPHONE ENCOUNTER
Okay, I will see her at 2:40 pm.     Sweetie Gao MD.   Family Physician.  [facility].

## 2017-11-09 NOTE — TELEPHONE ENCOUNTER
Ok to work in at 240 on Friday with PCP.      Called patient at [phone] (home) .Left message on voicemail to return phone call to triage.  Taylor Michaud RN [facility] Triage.

## 2017-11-10 ENCOUNTER — OFFICE VISIT (OUTPATIENT)
Dept: FAMILY MEDICINE | Facility: CLINIC | Age: 62
End: 2017-11-10
Payer: COMMERCIAL

## 2017-11-10 VITALS
SYSTOLIC BLOOD PRESSURE: 156 MMHG | WEIGHT: 171 LBS | DIASTOLIC BLOOD PRESSURE: 96 MMHG | BODY MASS INDEX: 30.05 KG/M2 | OXYGEN SATURATION: 98 % | TEMPERATURE: 97.7 F | HEART RATE: 71 BPM

## 2017-11-10 DIAGNOSIS — E63.9 NUTRITIONAL DEFICIENCY: ICD-10-CM

## 2017-11-10 DIAGNOSIS — I10 HYPERTENSION GOAL BP (BLOOD PRESSURE) < 140/90: Primary | ICD-10-CM

## 2017-11-10 DIAGNOSIS — M17.9 OSTEOARTHRITIS OF KNEE, UNSPECIFIED LATERALITY, UNSPECIFIED OSTEOARTHRITIS TYPE: ICD-10-CM

## 2017-11-10 DIAGNOSIS — K21.9 GASTROESOPHAGEAL REFLUX DISEASE, ESOPHAGITIS PRESENCE NOT SPECIFIED: ICD-10-CM

## 2017-11-10 DIAGNOSIS — M17.0 PRIMARY OSTEOARTHRITIS OF BOTH KNEES: ICD-10-CM

## 2017-11-10 PROCEDURE — 99214 OFFICE O/P EST MOD 30 MIN: CPT | Performed by: FAMILY MEDICINE

## 2017-11-10 RX ORDER — ACETAMINOPHEN 325 MG/1
325-650 TABLET ORAL EVERY 6 HOURS PRN
Qty: 100 TABLET | Refills: 3 | Status: SHIPPED | OUTPATIENT
Start: 2017-11-10 | End: 2018-08-27

## 2017-11-10 RX ORDER — METOPROLOL TARTRATE 25 MG/1
25 TABLET, FILM COATED ORAL 2 TIMES DAILY
Qty: 180 TABLET | Refills: 0 | Status: SHIPPED | OUTPATIENT
Start: 2017-11-10 | End: 2018-05-23

## 2017-11-10 ASSESSMENT — ANXIETY QUESTIONNAIRES
7. FEELING AFRAID AS IF SOMETHING AWFUL MIGHT HAPPEN: NOT AT ALL
3. WORRYING TOO MUCH ABOUT DIFFERENT THINGS: NOT AT ALL
5. BEING SO RESTLESS THAT IT IS HARD TO SIT STILL: NOT AT ALL
IF YOU CHECKED OFF ANY PROBLEMS ON THIS QUESTIONNAIRE, HOW DIFFICULT HAVE THESE PROBLEMS MADE IT FOR YOU TO DO YOUR WORK, TAKE CARE OF THINGS AT HOME, OR GET ALONG WITH OTHER PEOPLE: NOT DIFFICULT AT ALL
GAD7 TOTAL SCORE: 0
2. NOT BEING ABLE TO STOP OR CONTROL WORRYING: NOT AT ALL
1. FEELING NERVOUS, ANXIOUS, OR ON EDGE: NOT AT ALL
6. BECOMING EASILY ANNOYED OR IRRITABLE: NOT AT ALL

## 2017-11-10 ASSESSMENT — PATIENT HEALTH QUESTIONNAIRE - PHQ9: 5. POOR APPETITE OR OVEREATING: NOT AT ALL

## 2017-11-10 NOTE — MR AVS SNAPSHOT
"              After Visit Summary   11/10/2017    Sanjuana Salamanca    MRN: [id number]           Patient Information     Date Of Birth          1955        Visit Information        Provider Department      11/10/2017 2:40 PM Sweetie Goa MD [facility]        Today's Diagnoses     Gastroesophageal reflux disease, esophagitis presence not specified    -  1    Primary osteoarthritis of both knees        Osteoarthritis of knee, unspecified laterality, unspecified osteoarthritis type        Nutritional deficiency        Hypertension goal BP (blood pressure) < 140/90          Care Instructions    - take 1 pill of metoprolol 25 mg twice daily.   - follow up on Monday before you travel to [place].           Follow-ups after your visit        Who to contact     If you have questions or need follow up information about today's clinic visit or your schedule please contact [facility] directly at [phone].  Normal or non-critical lab and imaging results will be communicated to you by Be[employer], letter or phone within 4 business days after the clinic has received the results. If you do not hear from us within 7 days, please contact the clinic through [employer] or phone. If you have a critical or abnormal lab result, we will notify you by phone as soon as possible.  Submit refill requests through [employer] or call your pharmacy and they will forward the refill request to us. Please allow 3 business days for your refill to be completed.          Additional Information About Your Visit        MyChart Information     [employer] lets you send messages to your doctor, view your test results, renew your prescriptions, schedule appointments and more. To sign up, go to [email or web address] . Click on \"Log in\" on the left side of the screen, which will take you to the Welcome page. Then click on \"Sign up Now\" on the right side of the page.     You will be asked to enter the access code " listed below, as well as some personal information. Please follow the directions to create your username and password.     Your access code is: [email or web address]  Expires: 2017  3:05 PM     Your access code will  in 90 days. If you need help or a new code, please call your [place] clinic or [phone].        Care EveryWhere ID     This is your Care EveryWhere ID. This could be used by other organizations to access your [place] medical records  [id number]        Your Vitals Were     Pulse Temperature Pulse Oximetry BMI (Body Mass Index)          71 97.7  F (36.5  C) (Oral) 98% 30.05 kg/m2         Blood Pressure from Last 3 Encounters:   11/10/17 (!) 156/96   10/16/17 (!) 179/100   10/11/17 124/79    Weight from Last 3 Encounters:   11/10/17 171 lb (77.6 kg)   10/16/17 170 lb 3.2 oz (77.2 kg)   10/11/17 169 lb (76.7 kg)              Today, you had the following     No orders found for display         Today's Medication Changes          These changes are accurate as of: 11/10/17  3:53 PM.  If you have any questions, ask your nurse or doctor.               Start taking these medicines.        Dose/Directions    ranitidine 150 MG tablet   Commonly known as:  ZANTAC   Used for:  Gastroesophageal reflux disease, esophagitis presence not specified   Started by:  Sweetie Gao MD        Dose:  150 mg   Take 1 tablet (150 mg) by mouth 2 times daily   Quantity:  180 tablet   Refills:  1         These medicines have changed or have updated prescriptions.        Dose/Directions    diclofenac 50 MG EC tablet   Commonly known as:  VOLTAREN   This may have changed:  See the new instructions.   Used for:  Osteoarthritis of knee, unspecified laterality, unspecified osteoarthritis type   Changed by:  Sweetie Gao MD        TAKE 1 TABLET(50 MG) BY MOUTH THREE TIMES DAILY AS NEEDED FOR MODERATE PAIN   Quantity:  90 tablet   Refills:  0       metoprolol 25 MG tablet   Commonly known as:  LOPRESSOR    This may have changed:  how much to take   Used for:  Hypertension goal BP (blood pressure) < 140/90   Changed by:  Sweetie Gao MD        Dose:  25 mg   Take 1 tablet (25 mg) by mouth 2 times daily   Quantity:  180 tablet   Refills:  0       MULTI COMPLETE Caps   This may have changed:    - how much to take  - when to take this   Used for:  Nutritional deficiency   Changed by:  Sweetie Gao MD        Dose:  1 capsule   Take 1 capsule by mouth daily   Quantity:  100 capsule   Refills:  3         Stop taking these medicines if you haven't already. Please contact your care team if you have questions.     omeprazole 20 MG tablet   Stopped by:  Sweetie Gao MD                Where to get your medicines      These medications were sent to [facility] [address] [facility] [address] [facility] [address]  [address] [facility] [address]     Phone:  [phone]     acetaminophen 325 MG tablet    diclofenac 50 MG EC tablet    metoprolol 25 MG tablet    MULTI COMPLETE Caps    ranitidine 150 MG tablet                Primary Care Provider Office Phone # Fax #    Sweetie Gao [phone] [address] [facility] [address]        Equal Access to Services     ROSA SINCLAIR AH: [place] Soomaali, waaxda luqadaha, qaybta kaalmada adeegyada, waxay joãoin sander feliz. So [facility] [phone].    ATENCIÓN: Si habla español, tiene a ross disposición servicios gratuitos de asistencia lingüística. Llame al [phone].    We comply with applicable federal civil rights laws and [place] laws. We do not discriminate on the basis of race, color, national origin, age, disability, sex, sexual orientation, or gender identity.            Thank you!     Thank you for choosing [facility]  for your care. Our goal is always to provide you with excellent care. Hearing back from  our patients is one way we can continue to improve our services. Please take a few minutes to complete the written survey that you may receive in the mail after your visit with us. Thank you!             Your Updated Medication List - Protect others around you: Learn how to safely use, store and throw away your medicines at www.disposemymeds.org.          This list is accurate as of: 11/10/17  3:53 PM.  Always use your most recent med list.                   Brand Name Dispense Instructions for use Diagnosis    acetaminophen 325 MG tablet    TYLENOL    100 tablet    Take 1-2 tablets (325-650 mg) by mouth every 6 hours as needed for mild pain    Primary osteoarthritis of both knees       albuterol 108 (90 BASE) MCG/ACT Inhaler    PROAIR HFA/PROVENTIL HFA/VENTOLIN HFA    1 Inhaler    Inhale 2 puffs into the lungs every 4 hours as needed    Cough       cholecalciferol 1000 UNIT tablet    vitamin D3    100 tablet    Take 1 tablet (1,000 Units) by mouth daily    Avitaminosis D       diclofenac 50 MG EC tablet    VOLTAREN    90 tablet    TAKE 1 TABLET(50 MG) BY MOUTH THREE TIMES DAILY AS NEEDED FOR MODERATE PAIN    Osteoarthritis of knee, unspecified laterality, unspecified osteoarthritis type       fluticasone 110 MCG/ACT Inhaler    FLOVENT HFA    1 Inhaler    Inhale 2 puffs into the lungs 2 times daily    Cough       metoprolol 25 MG tablet    LOPRESSOR    180 tablet    Take 1 tablet (25 mg) by mouth 2 times daily    Hypertension goal BP (blood pressure) < 140/90       montelukast 10 MG tablet    SINGULAIR    90 tablet    Take 1 tablet (10 mg) by mouth At Bedtime    Cough       MULTI COMPLETE Caps     100 capsule    Take 1 capsule by mouth daily    Nutritional deficiency       order for DME     1 kit    Use as directed.    Essential hypertension with goal blood pressure less than 140/90       polyethylene glycol powder    MIRALAX    510 g    Take 17 g (1 capful) by mouth daily    Constipation, unspecified constipation  type       ranitidine 150 MG tablet    ZANTAC    180 tablet    Take 1 tablet (150 mg) by mouth 2 times daily    Gastroesophageal reflux disease, esophagitis presence not specified       selenium sulfide 2.5 % lotion    SELSUN    118 mL    Apply to affected area and lather with small amounts of water; leave on skin for 10 minutes, then rinse thoroughly; repeat once every day for 7 days for 8 weeks.    Tinea versicolor

## 2017-11-10 NOTE — PROGRESS NOTES
SUBJECTIVE:   Sanjunaa Salamanca is a 62 year old female who presents to clinic today for the following health issues:    Hypertension Follow-up    Outpatient blood pressures are being checked at home.      Low Salt Diet: no added salt    Amount of exercise or physical activity: None    Problems taking medications regularly: No    Medication side effects: none    Diet: low salt    Cough follow up     She is traveling to [place] for 2-3 months to visit her mother. She needs refill on all her medications.   She has seen allergy specialist and was prescribed inhalers. It helped with her cough.     Joint pains and body aches under good control with diclofenac and tylenol as needed.     Problem list and histories reviewed & adjusted, as indicated.  Additional history: as documented    Patient Active Problem List   Diagnosis     Avitaminosis D     Esophageal reflux     OA (osteoarthritis) of knee     Advanced directives, counseling/discussion     Hyperlipidemia LDL goal <160     Cataracts, both eyes     Dry eyes     Hypokalemia     Prediabetes     Essential hypertension with goal blood pressure less than 140/90     History reviewed. No pertinent surgical history.    Social History   Substance Use Topics     Smoking status: Never Smoker     Smokeless tobacco: Never Used     Alcohol use No     Family History   Problem Relation Age of Onset     Hypertension Mother      Thyroid Disease Mother      DIABETES Other      CEREBROVASCULAR DISEASE Other      Glaucoma No family hx of      Macular Degeneration No family hx of      CANCER No family hx of          Current Outpatient Prescriptions   Medication Sig Dispense Refill     ranitidine (ZANTAC) 150 MG tablet Take 1 tablet (150 mg) by mouth 2 times daily 180 tablet 1     acetaminophen (TYLENOL) 325 MG tablet Take 1-2 tablets (325-650 mg) by mouth every 6 hours as needed for mild pain 100 tablet 3     diclofenac (VOLTAREN) 50 MG EC tablet TAKE 1 TABLET(50 MG) BY MOUTH THREE TIMES DAILY  AS NEEDED FOR MODERATE PAIN 90 tablet 0     Multiple Vitamins-Minerals (MULTI COMPLETE) CAPS Take 1 capsule by mouth daily 100 capsule 3     metoprolol (LOPRESSOR) 25 MG tablet Take 1 tablet (25 mg) by mouth 2 times daily 180 tablet 0     fluticasone (FLOVENT HFA) 110 MCG/ACT Inhaler Inhale 2 puffs into the lungs 2 times daily 1 Inhaler 1     albuterol (PROAIR HFA/PROVENTIL HFA/VENTOLIN HFA) 108 (90 BASE) MCG/ACT Inhaler Inhale 2 puffs into the lungs every 4 hours as needed 1 Inhaler 1     montelukast (SINGULAIR) 10 MG tablet Take 1 tablet (10 mg) by mouth At Bedtime 90 tablet 0     selenium sulfide (SELSUN) 2.5 % lotion Apply to affected area and lather with small amounts of water; leave on skin for 10 minutes, then rinse thoroughly; repeat once every day for 7 days for 8 weeks. 118 mL 1     cholecalciferol (VITAMIN D3) 1000 UNIT tablet Take 1 tablet (1,000 Units) by mouth daily 100 tablet 3     order for DME Use as directed. 1 kit 0     polyethylene glycol (MIRALAX) powder Take 17 g (1 capful) by mouth daily 510 g 1     BP Readings from Last 3 Encounters:   11/10/17 (!) 156/96   10/16/17 (!) 179/100   10/11/17 124/79    Wt Readings from Last 3 Encounters:   11/10/17 171 lb (77.6 kg)   10/16/17 170 lb 3.2 oz (77.2 kg)   10/11/17 169 lb (76.7 kg)                  Labs reviewed in EPIC        Reviewed and updated as needed this visit by clinical staffTobacco  Allergies  Meds  Med Hx  Surg Hx  Fam Hx  Soc Hx      Reviewed and updated as needed this visit by Provider         ROS:  Constitutional, HEENT, cardiovascular, pulmonary, gi and gu systems are negative, except as otherwise noted.      OBJECTIVE:   BP (!) 156/96 (BP Location: Right arm, Patient Position: Sitting, Cuff Size: Adult Regular)  Pulse 71  Temp 97.7  F (36.5  C) (Oral)  Wt 171 lb (77.6 kg)  SpO2 98%  BMI 30.05 kg/m2  Body mass index is 30.05 kg/(m^2).  GENERAL: healthy, alert and no distress  NECK: no adenopathy, no asymmetry, masses, or  scars and thyroid normal to palpation  RESP: lungs clear to auscultation - no rales, rhonchi or wheezes  CV: regular rate and rhythm, normal S1 S2, no S3 or S4, no murmur, click or rub, no peripheral edema and peripheral pulses strong  ABDOMEN: soft, nontender, no hepatosplenomegaly, no masses and bowel sounds normal  MS: no gross musculoskeletal defects noted, no edema    ASSESSMENT/PLAN:       ICD-10-CM    1. Hypertension goal BP (blood pressure) < 140/90 I10 metoprolol (LOPRESSOR) 25 MG tablet   2. Gastroesophageal reflux disease, esophagitis presence not specified K21.9 ranitidine (ZANTAC) 150 MG tablet   3. Primary osteoarthritis of both knees M17.0 acetaminophen (TYLENOL) 325 MG tablet   4. Osteoarthritis of knee, unspecified laterality, unspecified osteoarthritis type M17.10 diclofenac (VOLTAREN) 50 MG EC tablet   5. Nutritional deficiency E63.9 Multiple Vitamins-Minerals (MULTI COMPLETE) CAPS     Pt could not tell me what medications she is taking . She could not tell me how much metoprolol she is on.   Also she could not tell about her inhalers.     Advised to take 1 pill of metoprolol instead of 1/2 twice daily. F/u on Monday, bring all the medications with you.     Sweetie Gao MD  [facility]

## 2017-11-10 NOTE — PATIENT INSTRUCTIONS
- take 1 pill of metoprolol 25 mg twice daily.   - follow up on Monday before you travel to [place].

## 2017-11-11 ASSESSMENT — ANXIETY QUESTIONNAIRES: GAD7 TOTAL SCORE: 0

## 2017-11-13 ENCOUNTER — OFFICE VISIT (OUTPATIENT)
Dept: FAMILY MEDICINE | Facility: CLINIC | Age: 62
End: 2017-11-13
Payer: COMMERCIAL

## 2017-11-13 VITALS
BODY MASS INDEX: 29.88 KG/M2 | WEIGHT: 170 LBS | TEMPERATURE: 97.8 F | SYSTOLIC BLOOD PRESSURE: 132 MMHG | HEART RATE: 76 BPM | DIASTOLIC BLOOD PRESSURE: 87 MMHG

## 2017-11-13 DIAGNOSIS — I10 ESSENTIAL HYPERTENSION WITH GOAL BLOOD PRESSURE LESS THAN 140/90: Primary | ICD-10-CM

## 2017-11-13 PROCEDURE — 99212 OFFICE O/P EST SF 10 MIN: CPT | Performed by: FAMILY MEDICINE

## 2017-11-13 NOTE — NURSING NOTE
"Chief Complaint   Patient presents with     Hypertension       Initial /90 (BP Location: Left arm, Patient Position: Sitting, Cuff Size: Adult Large)  Pulse 77  Temp 97.8  F (36.6  C) (Oral)  Wt 170 lb (77.1 kg)  BMI 29.88 kg/m2 Estimated body mass index is 29.88 kg/(m^2) as calculated from the following:    Height as of 5/17/17: 5' 3.25\" (1.607 m).    Weight as of this encounter: 170 lb (77.1 kg).  Medication Reconciliation: complete  Fede Castellanos MA    "

## 2017-11-13 NOTE — MR AVS SNAPSHOT
"              After Visit Summary   2017    Sanjuana Salamanca    MRN: [id number]           Patient Information     Date Of Birth          1955        Visit Information        Provider Department      2017 2:00 PM Sweetie Gao MD [facility]        Today's Diagnoses     Essential hypertension with goal blood pressure less than 140/90    -  1       Follow-ups after your visit        Who to contact     If you have questions or need follow up information about today's clinic visit or your schedule please contact [facility] directly at [phone].  Normal or non-critical lab and imaging results will be communicated to you by [employer], letter or phone within 4 business days after the clinic has received the results. If you do not hear from us within 7 days, please contact the clinic through [employer] or phone. If you have a critical or abnormal lab result, we will notify you by phone as soon as possible.  Submit refill requests through [employer] or call your pharmacy and they will forward the refill request to us. Please allow 3 business days for your refill to be completed.          Additional Information About Your Visit        MyChart Information     [employer] lets you send messages to your doctor, view your test results, renew your prescriptions, schedule appointments and more. To sign up, go to [email or web address] . Click on \"Log in\" on the left side of the screen, which will take you to the Welcome page. Then click on \"Sign up Now\" on the right side of the page.     You will be asked to enter the access code listed below, as well as some personal information. Please follow the directions to create your username and password.     Your access code is: [email or web address]  Expires: 2018  2:29 PM     Your access code will  in 90 days. If you need help or a new code, please call your [facility] or [phone].        Care EveryWhere ID     This " is your Care EveryWhere ID. This could be used by other organizations to access your [facility] medical records  [id number]        Your Vitals Were     Pulse Temperature BMI (Body Mass Index)             76 97.8  F (36.6  C) (Oral) 29.88 kg/m2          Blood Pressure from Last 3 Encounters:   11/13/17 132/87   11/10/17 (!) 156/96   10/16/17 (!) 179/100    Weight from Last 3 Encounters:   11/13/17 170 lb (77.1 kg)   11/10/17 171 lb (77.6 kg)   10/16/17 170 lb 3.2 oz (77.2 kg)              Today, you had the following     No orders found for display       Primary Care Provider Office Phone # Fax #    Sweetie Brayden Gao [phone]       [address] [facility] [address]        Equal Access to Services     [facility]: Hadii aad ku hadasho Soarchana, waaxda luqadaha, qaybta kaalmada adeegyada, talisha ley . So [facility] [phone].    ATENCIÓN: Si habla español, tiene a ross disposición servicios gratuitos de asistencia lingüística. Llame al [phone].    We comply with applicable federal civil rights laws and [place] laws. We do not discriminate on the basis of race, color, national origin, age, disability, sex, sexual orientation, or gender identity.            Thank you!     Thank you for choosing [facility]  for your care. Our goal is always to provide you with excellent care. Hearing back from our patients is one way we can continue to improve our services. Please take a few minutes to complete the written survey that you may receive in the mail after your visit with us. Thank you!             Your Updated Medication List - Protect others around you: Learn how to safely use, store and throw away your medicines at www.disposemymeds.org.          This list is accurate as of: 11/13/17  2:29 PM.  Always use your most recent med list.                   Brand Name Dispense Instructions for use Diagnosis    acetaminophen 325 MG tablet     TYLENOL    100 tablet    Take 1-2 tablets (325-650 mg) by mouth every 6 hours as needed for mild pain    Primary osteoarthritis of both knees       albuterol 108 (90 BASE) MCG/ACT Inhaler    PROAIR HFA/PROVENTIL HFA/VENTOLIN HFA    1 Inhaler    Inhale 2 puffs into the lungs every 4 hours as needed    Cough       cholecalciferol 1000 UNIT tablet    vitamin D3    100 tablet    Take 1 tablet (1,000 Units) by mouth daily    Avitaminosis D       diclofenac 50 MG EC tablet    VOLTAREN    90 tablet    TAKE 1 TABLET(50 MG) BY MOUTH THREE TIMES DAILY AS NEEDED FOR MODERATE PAIN    Osteoarthritis of knee, unspecified laterality, unspecified osteoarthritis type       fluticasone 110 MCG/ACT Inhaler    FLOVENT HFA    1 Inhaler    Inhale 2 puffs into the lungs 2 times daily    Cough       metoprolol 25 MG tablet    LOPRESSOR    180 tablet    Take 1 tablet (25 mg) by mouth 2 times daily    Hypertension goal BP (blood pressure) < 140/90       montelukast 10 MG tablet    SINGULAIR    90 tablet    Take 1 tablet (10 mg) by mouth At Bedtime    Cough       MULTI COMPLETE Caps     100 capsule    Take 1 capsule by mouth daily    Nutritional deficiency       order for DME     1 kit    Use as directed.    Essential hypertension with goal blood pressure less than 140/90       polyethylene glycol powder    MIRALAX    510 g    Take 17 g (1 capful) by mouth daily    Constipation, unspecified constipation type       ranitidine 150 MG tablet    ZANTAC    180 tablet    Take 1 tablet (150 mg) by mouth 2 times daily    Gastroesophageal reflux disease, esophagitis presence not specified       selenium sulfide 2.5 % lotion    SELSUN    118 mL    Apply to affected area and lather with small amounts of water; leave on skin for 10 minutes, then rinse thoroughly; repeat once every day for 7 days for 8 weeks.    Tinea versicolor

## 2017-11-13 NOTE — PROGRESS NOTES
SUBJECTIVE:   Sanjuana Salamanca is a 62 year old female who presents to clinic today for the following health issues:      Hypertension Follow-up    Outpatient blood pressures are being checked     Low Salt Diet: no added salt    Amount of exercise or physical activity: walks     Problems taking medications regularly: No    Medication side effects: none    Diet: low salt    She was seen last week, her BP was high, metoprolol was increased from 1/2 pill to 1 pill daily. She is taking it daily.     She has her inhalers with her however did not bring her medications with her.     Problem list and histories reviewed & adjusted, as indicated.  Additional history: as documented    Patient Active Problem List   Diagnosis     Avitaminosis D     Esophageal reflux     OA (osteoarthritis) of knee     Advanced directives, counseling/discussion     Hyperlipidemia LDL goal <160     Cataracts, both eyes     Dry eyes     Hypokalemia     Prediabetes     Essential hypertension with goal blood pressure less than 140/90     History reviewed. No pertinent surgical history.    Social History   Substance Use Topics     Smoking status: Never Smoker     Smokeless tobacco: Never Used     Alcohol use No     Family History   Problem Relation Age of Onset     Hypertension Mother      Thyroid Disease Mother      DIABETES Other      CEREBROVASCULAR DISEASE Other      Glaucoma No family hx of      Macular Degeneration No family hx of      CANCER No family hx of          Current Outpatient Prescriptions   Medication Sig Dispense Refill     ranitidine (ZANTAC) 150 MG tablet Take 1 tablet (150 mg) by mouth 2 times daily 180 tablet 1     acetaminophen (TYLENOL) 325 MG tablet Take 1-2 tablets (325-650 mg) by mouth every 6 hours as needed for mild pain 100 tablet 3     diclofenac (VOLTAREN) 50 MG EC tablet TAKE 1 TABLET(50 MG) BY MOUTH THREE TIMES DAILY AS NEEDED FOR MODERATE PAIN 90 tablet 0     Multiple Vitamins-Minerals (MULTI COMPLETE) CAPS Take 1  capsule by mouth daily 100 capsule 3     metoprolol (LOPRESSOR) 25 MG tablet Take 1 tablet (25 mg) by mouth 2 times daily 180 tablet 0     fluticasone (FLOVENT HFA) 110 MCG/ACT Inhaler Inhale 2 puffs into the lungs 2 times daily 1 Inhaler 1     albuterol (PROAIR HFA/PROVENTIL HFA/VENTOLIN HFA) 108 (90 BASE) MCG/ACT Inhaler Inhale 2 puffs into the lungs every 4 hours as needed 1 Inhaler 1     montelukast (SINGULAIR) 10 MG tablet Take 1 tablet (10 mg) by mouth At Bedtime 90 tablet 0     selenium sulfide (SELSUN) 2.5 % lotion Apply to affected area and lather with small amounts of water; leave on skin for 10 minutes, then rinse thoroughly; repeat once every day for 7 days for 8 weeks. 118 mL 1     cholecalciferol (VITAMIN D3) 1000 UNIT tablet Take 1 tablet (1,000 Units) by mouth daily 100 tablet 3     order for DME Use as directed. 1 kit 0     polyethylene glycol (MIRALAX) powder Take 17 g (1 capful) by mouth daily 510 g 1     BP Readings from Last 3 Encounters:   11/13/17 132/87   11/10/17 (!) 156/96   10/16/17 (!) 179/100    Wt Readings from Last 3 Encounters:   11/13/17 170 lb (77.1 kg)   11/10/17 171 lb (77.6 kg)   10/16/17 170 lb 3.2 oz (77.2 kg)            Labs reviewed in EPIC    Reviewed and updated as needed this [facility]  Meds       Reviewed and updated as needed this visit by Provider         ROS:  Constitutional, HEENT, cardiovascular, pulmonary, gi and gu systems are negative, except as otherwise noted.      OBJECTIVE:   /87 (BP Location: Right arm, Patient Position: Sitting, Cuff Size: Adult Large)  Pulse 76  Temp 97.8  F (36.6  C) (Oral)  Wt 170 lb (77.1 kg)  BMI 29.88 kg/m2  Body mass index is 29.88 kg/(m^2).  GENERAL: healthy, alert and no distress    ASSESSMENT/PLAN:       ICD-10-CM    1. Essential hypertension with goal blood pressure less than 140/90 I10      Continue with metoprolol 25 mg BID.   Continue to check BP at home. F/u in 3 months , sooner if needed.    Limit use of diclofenac, take tylenol for pain.     Inhaler use discussed, flovent twice daily and albuterol as needed for SOB.     Sweetie Gao MD  [facility]

## 2017-12-13 DIAGNOSIS — M17.9 OSTEOARTHRITIS OF KNEE, UNSPECIFIED LATERALITY, UNSPECIFIED OSTEOARTHRITIS TYPE: ICD-10-CM

## 2017-12-13 NOTE — TELEPHONE ENCOUNTER
Requested Prescriptions   Pending Prescriptions Disp Refills     diclofenac (VOLTAREN) 50 MG EC tablet [Pharmacy Med Name: DICLOFENAC SODIUM 50MG DR TABLETS] 90 tablet 0     Sig: TAKE 1 TABLET(50 MG) BY MOUTH THREE TIMES DAILY AS NEEDED FOR MODERATE PAIN    NSAID Medications Passed    12/13/2017 11:01 AM       Passed - Blood pressure under 140/90    BP Readings from Last 3 Encounters:   11/13/17 132/87   11/10/17 (!) 156/96   10/16/17 (!) 179/100                Passed - Normal ALT on file in past 12 months    Recent Labs   Lab Test  02/06/17   1524   ALT  30            Passed - Normal AST on file in past 12 months    Recent Labs   Lab Test  02/06/17   1524   AST  29            Passed - Recent or future visit with authorizing provider's specialty    Patient had office visit in the last year or has a visit in the next 30 days with authorizing provider.  See chart review.              Passed - Patient is age 6-64 years       Passed - Normal CBC on file in past 12 months    Recent Labs   Lab Test  09/08/17   1425   WBC  9.6   RBC  4.85   HGB  13.8   HCT  41.9   PLT  361            Passed - No active pregnancy on record       Passed - Normal serum creatinine on file in past 12 months    Recent Labs   Lab Test  02/06/17   1524   CR  0.66            Passed - No positive pregnancy test in past 12 months

## 2017-12-13 NOTE — TELEPHONE ENCOUNTER
Routing refill request to provider for review/approval because:  Patient needs to be seen because it has been more than 1 year since last office visit.  11/13/17 OV notes to have patient limit use of Voltaren - has used 90 tabs since 11/10/17      Kym Gerber RN  [facility]

## 2017-12-14 NOTE — TELEPHONE ENCOUNTER
Pt has appointment with me tomorrow. I will refill her medications if appropriate during her visit with me.     Sweetie Gao MD.   Family Physician.  [facility].

## 2017-12-15 ENCOUNTER — OFFICE VISIT (OUTPATIENT)
Dept: FAMILY MEDICINE | Facility: CLINIC | Age: 62
End: 2017-12-15
Payer: COMMERCIAL

## 2017-12-15 VITALS
SYSTOLIC BLOOD PRESSURE: 135 MMHG | HEART RATE: 66 BPM | DIASTOLIC BLOOD PRESSURE: 82 MMHG | OXYGEN SATURATION: 95 % | TEMPERATURE: 98.2 F | WEIGHT: 170 LBS | BODY MASS INDEX: 29.88 KG/M2

## 2017-12-15 DIAGNOSIS — R05.9 COUGH: Primary | ICD-10-CM

## 2017-12-15 DIAGNOSIS — J45.901 REACTIVE AIRWAY DISEASE WITH ACUTE EXACERBATION, UNSPECIFIED ASTHMA SEVERITY, UNSPECIFIED WHETHER PERSISTENT: ICD-10-CM

## 2017-12-15 PROCEDURE — 99213 OFFICE O/P EST LOW 20 MIN: CPT | Performed by: FAMILY MEDICINE

## 2017-12-15 RX ORDER — AZITHROMYCIN 250 MG/1
TABLET, FILM COATED ORAL
Qty: 6 TABLET | Refills: 0 | Status: SHIPPED | OUTPATIENT
Start: 2017-12-15 | End: 2017-12-21

## 2017-12-15 RX ORDER — MONTELUKAST SODIUM 10 MG/1
10 TABLET ORAL AT BEDTIME
Qty: 90 TABLET | Refills: 0 | Status: SHIPPED | OUTPATIENT
Start: 2017-12-15 | End: 2017-12-21

## 2017-12-15 RX ORDER — CODEINE PHOSPHATE AND GUAIFENESIN 10; 100 MG/5ML; MG/5ML
1 SOLUTION ORAL
Qty: 120 ML | Refills: 0 | Status: SHIPPED | OUTPATIENT
Start: 2017-12-15 | End: 2018-01-05

## 2017-12-15 NOTE — NURSING NOTE
"Chief Complaint   Patient presents with     Cough     runny nose        Initial /82  Pulse 66  Temp 98.2  F (36.8  C) (Oral)  Wt 170 lb (77.1 kg)  SpO2 95%  BMI 29.88 kg/m2 Estimated body mass index is 29.88 kg/(m^2) as calculated from the following:    Height as of 5/17/17: 5' 3.25\" (1.607 m).    Weight as of this encounter: 170 lb (77.1 kg).  Medication Reconciliation: complete  Fede Castellanos MA    "

## 2017-12-15 NOTE — PROGRESS NOTES
SUBJECTIVE:   Sanjuana Salamanca is a 62 year old female who presents to clinic today for the following health issues:    ENT Symptoms             Symptoms: cc Present Absent Comment   Fever/Chills   x    Fatigue   x    Muscle Aches   x    Eye Irritation  x     Sneezing  x     Nasal Nicholas/Drg  x     Sinus Pressure/Pain   x    Loss of smell   x    Dental pain   x    Sore Throat   x    Swollen Glands   x    Ear Pain/Fullness   x    Cough  x     Wheeze   x    Chest Pain   x    Shortness of breath   x    Rash   x    Other         Symptom duration:  x long time   Symptom severity:  moderate   Treatments tried:  otc cold meds    Contacts:  none      She recently visited her mother in [place]. Her mother had cough and was diagnosed with bronchitis. Mother was prescribed z pack.     Pt has chronic cough on and off.   She was seen by allergy specialist: was seen. She says she uses her inhalers as prescribed. She is out of singulair for last few days.     Pt was referred to ENT, she never schedule appointment.     She has above symptoms since she returned from [place].     Problem list and histories reviewed & adjusted, as indicated.  Additional history: as documented    Patient Active Problem List   Diagnosis     Avitaminosis D     Esophageal reflux     OA (osteoarthritis) of knee     Advanced directives, counseling/discussion     Hyperlipidemia LDL goal <160     Cataracts, both eyes     Dry eyes     Hypokalemia     Prediabetes     Essential hypertension with goal blood pressure less than 140/90     History reviewed. No pertinent surgical history.    Social History   Substance Use Topics     Smoking status: Never Smoker     Smokeless tobacco: Never Used     Alcohol use No     Family History   Problem Relation Age of Onset     Hypertension Mother      Thyroid Disease Mother      DIABETES Other      CEREBROVASCULAR DISEASE Other      Glaucoma No family hx of      Macular Degeneration No family hx of      CANCER No family hx of           Current Outpatient Prescriptions   Medication Sig Dispense Refill     azithromycin (ZITHROMAX) 250 MG tablet Two tablets first day, then one tablet daily for four days. 6 tablet 0     guaiFENesin-codeine (ROBITUSSIN AC) 100-10 MG/5ML SOLN solution Take 5 mLs by mouth nightly as needed for cough 120 mL 0     montelukast (SINGULAIR) 10 MG tablet Take 1 tablet (10 mg) by mouth At Bedtime 90 tablet 0     ranitidine (ZANTAC) 150 MG tablet Take 1 tablet (150 mg) by mouth 2 times daily 180 tablet 1     acetaminophen (TYLENOL) 325 MG tablet Take 1-2 tablets (325-650 mg) by mouth every 6 hours as needed for mild pain 100 tablet 3     diclofenac (VOLTAREN) 50 MG EC tablet TAKE 1 TABLET(50 MG) BY MOUTH THREE TIMES DAILY AS NEEDED FOR MODERATE PAIN 90 tablet 0     Multiple Vitamins-Minerals (MULTI COMPLETE) CAPS Take 1 capsule by mouth daily 100 capsule 3     metoprolol (LOPRESSOR) 25 MG tablet Take 1 tablet (25 mg) by mouth 2 times daily 180 tablet 0     fluticasone (FLOVENT HFA) 110 MCG/ACT Inhaler Inhale 2 puffs into the lungs 2 times daily 1 Inhaler 1     albuterol (PROAIR HFA/PROVENTIL HFA/VENTOLIN HFA) 108 (90 BASE) MCG/ACT Inhaler Inhale 2 puffs into the lungs every 4 hours as needed 1 Inhaler 1     selenium sulfide (SELSUN) 2.5 % lotion Apply to affected area and lather with small amounts of water; leave on skin for 10 minutes, then rinse thoroughly; repeat once every day for 7 days for 8 weeks. 118 mL 1     cholecalciferol (VITAMIN D3) 1000 UNIT tablet Take 1 tablet (1,000 Units) by mouth daily 100 tablet 3     order for DME Use as directed. 1 kit 0     polyethylene glycol (MIRALAX) powder Take 17 g (1 capful) by mouth daily 510 g 1     Allergies   Allergen Reactions     Valsartan Cough     Recent Labs   Lab Test  08/14/17   1620  02/06/17   1524   01/15/16   1220   07/29/15   1231  09/19/14   1533  04/14/14   1555   A1C   --   5.7   --   6.0   --   6.2*   --    --    LDL   --    --    --    --    --   135*  138*   165*   HDL   --    --    --    --    --   32*  42*  34*   TRIG   --    --    --    --    --   220*  181*  137   ALT   --   30   --   32   --   28   --    --    CR   --   0.66   --   0.57   < >  0.48*  0.61   --    GFRESTIMATED   --   >90  Non  GFR Calc     --   >90  Non  GFR Calc     < >  >90  Non  GFR Calc    >90  Non  GFR Calc     --    GFRESTBLACK   --   >90   GFR Calc     --   >90   GFR Calc     < >  >90   GFR Calc    >90   GFR Calc     --    POTASSIUM  3.9  3.5   < >  3.7   < >  3.1*  3.7  3.6   TSH   --   1.35   --    --    --   1.72   --    --     < > = values in this interval not displayed.      BP Readings from Last 3 Encounters:   12/15/17 135/82   11/13/17 132/87   11/10/17 (!) 156/96    Wt Readings from Last 3 Encounters:   12/15/17 170 lb (77.1 kg)   11/13/17 170 lb (77.1 kg)   11/10/17 171 lb (77.6 kg)                  Labs reviewed in EPIC          Reviewed and updated as needed this visit by clinical staffTobacco  Allergies  Meds  Med Hx  Surg Hx  Fam Hx  Soc Hx      Reviewed and updated as needed this visit by Provider         ROS:  Constitutional, HEENT, cardiovascular, pulmonary, gi and gu systems are negative, except as otherwise noted.      OBJECTIVE:   /82  Pulse 66  Temp 98.2  F (36.8  C) (Oral)  Wt 170 lb (77.1 kg)  SpO2 95%  BMI 29.88 kg/m2  Body mass index is 29.88 kg/(m^2).  GENERAL: healthy, alert and no distress  HENT: ear canals and TM's normal, nose and mouth without ulcers or lesions  NECK: no adenopathy, no asymmetry, masses, or scars and thyroid normal to palpation  RESP: lungs are mostly clear with bilateral mild expiratory wheezes.   CV: regular rate and rhythm, normal S1 S2, no S3 or S4  Diagnostic Test Results:  none     ASSESSMENT/PLAN:       ICD-10-CM    1. Cough R05 azithromycin (ZITHROMAX) 250 MG tablet     ALLERGY/ASTHMA ADULT  REFERRAL     OTOLARYNGOLOGY REFERRAL     guaiFENesin-codeine (ROBITUSSIN AC) 100-10 MG/5ML SOLN solution     montelukast (SINGULAIR) 10 MG tablet   2. Reactive airway disease with acute exacerbation, unspecified asthma severity, unspecified whether persistent J45.901 azithromycin (ZITHROMAX) 250 MG tablet     ALLERGY/ASTHMA ADULT REFERRAL     Rx as above.   Strongly encouraged to use inhalers appropriately and take Singulair daily.   F/u with asthma and allergy as recommended.   Get seen by ENT for chronic cough.     Sweetie Gao MD  [facility]

## 2017-12-15 NOTE — MR AVS SNAPSHOT
After Visit Summary   12/15/2017    Sanjuana Salamanca    MRN: [id number]           Patient Information     Date Of Birth          1955        Visit Information        Provider Department      12/15/2017 2:00 PM Sweetie Gao MD [facility]        Today's Diagnoses     Cough    -  1    Reactive airway disease with acute exacerbation, unspecified asthma severity, unspecified whether persistent           Follow-ups after your visit        Additional Services     ALLERGY/ASTHMA ADULT REFERRAL       Your provider has referred you to: [facility]: [facility] [phone]  [email or web address]    Please be aware that coverage of these services is subject to the terms and limitations of your health insurance plan.  Call member services at your health plan with any benefit or coverage questions.      Please bring the following with you to your appointment:    (1) Any X-Rays, CTs or MRIs which have been performed.  Contact the facility where they were done to arrange for  prior to your scheduled appointment.    (2) List of current medications  (3) This referral request   (4) Any documents/labs given to you for this referral            OTOLARYNGOLOGY REFERRAL       Your provider has referred you to: [facility]: [facility] [phone]   [email or web address]    Please be aware that coverage of these services is subject to the terms and limitations of your health insurance plan.  Call member services at your health plan with any benefit or coverage questions.      Please bring the following with you to your appointment:    (1) Any X-Rays, CTs or MRIs which have been performed.  Contact the facility where they were done to arrange for  prior to your scheduled appointment.   (2) List of current medications  (3) This referral request   (4) Any documents/labs given to you for this referral             "      Who to contact     If you have questions or need follow up information about today's clinic visit or your schedule please contact [facility] directly at [phone].  Normal or non-critical lab and imaging results will be communicated to you by MyChart, letter or phone within 4 business days after the clinic has received the results. If you do not hear from us within 7 days, please contact the clinic through MyChart or phone. If you have a critical or abnormal lab result, we will notify you by phone as soon as possible.  Submit refill requests through [employer] or call your pharmacy and they will forward the refill request to us. Please allow 3 business days for your refill to be completed.          Additional Information About Your Visit        [employer]" Information     [employer] lets you send messages to your doctor, view your test results, renew your prescriptions, schedule appointments and more. To sign up, go to [email or web address] . Click on \"Log in\" on the left side of the screen, which will take you to the Welcome page. Then click on \"Sign up Now\" on the right side of the page.     You will be asked to enter the access code listed below, as well as some personal information. Please follow the directions to create your username and password.     Your access code is: [email or web address]  Expires: 2018  2:29 PM     Your access code will  in 90 days. If you need help or a new code, please call your [facility] clinic or [phone].        Care EveryWhere ID     This is your Care EveryWhere ID. This could be used by other organizations to access your [facility] medical records  [id number]        Your Vitals Were     Pulse Temperature Pulse Oximetry BMI (Body Mass Index)          66 98.2  F (36.8  C) (Oral) 95% 29.88 kg/m2         Blood Pressure from Last 3 Encounters:   12/15/17 135/82   17 132/87   11/10/17 (!) 156/96    Weight from Last 3 Encounters:   12/15/17 170 lb (77.1 " kg)   11/13/17 170 lb (77.1 kg)   11/10/17 171 lb (77.6 kg)              We Performed the Following     ALLERGY/ASTHMA ADULT REFERRAL     OTOLARYNGOLOGY REFERRAL          Today's Medication Changes          These changes are accurate as of: 12/15/17  2:46 PM.  If you have any questions, ask your nurse or doctor.               Start taking these medicines.        Dose/Directions    azithromycin 250 MG tablet   Commonly known as:  ZITHROMAX   Used for:  Cough, Reactive airway disease with acute exacerbation, unspecified asthma severity, unspecified whether persistent   Started by:  Sweetie Gao MD        Two tablets first day, then one tablet daily for four days.   Quantity:  6 tablet   Refills:  0       guaiFENesin-codeine 100-10 MG/5ML Soln solution   Commonly known as:  ROBITUSSIN AC   Used for:  Cough   Started by:  Sweetie Gao MD        Dose:  1 tsp.   Take 5 mLs by mouth nightly as needed for cough   Quantity:  120 mL   Refills:  0            Where to get your medicines      These medications were sent to [facility] [address] [place], [place] - [address] AT [facility] [address]  [address] [facility] [address]     Phone:  [phone]     azithromycin 250 MG tablet         Some of these will need a paper prescription and others can be bought over the counter.  Ask your nurse if you have questions.     Bring a paper prescription for each of these medications     guaiFENesin-codeine 100-10 MG/5ML Soln solution                Primary Care Provider Office Phone # Fax #    Sweetie Gao [phone] [address] [facility] [address]        Equal Access to Services     [facility] : [place], victorino casper, talisha caldwell. So [facility] [phone].    ATENCIÓN: Si habla español, tiene a ross disposición servicios gratuitos de asistencia  lingüística. Jeremiah al [phone].    We comply with applicable federal civil rights laws and [place] laws. We do not discriminate on the basis of race, color, national origin, age, disability, sex, sexual orientation, or gender identity.            Thank you!     Thank you for choosing [facility]  for your care. Our goal is always to provide you with excellent care. Hearing back from our patients is one way we can continue to improve our services. Please take a few minutes to complete the written survey that you may receive in the mail after your visit with us. Thank you!             Your Updated Medication List - Protect others around you: Learn how to safely use, store and throw away your medicines at www.disposemymeds.org.          This list is accurate as of: 12/15/17  2:46 PM.  Always use your most recent med list.                   Brand Name Dispense Instructions for use Diagnosis    acetaminophen 325 MG tablet    TYLENOL    100 tablet    Take 1-2 tablets (325-650 mg) by mouth every 6 hours as needed for mild pain    Primary osteoarthritis of both knees       albuterol 108 (90 BASE) MCG/ACT Inhaler    PROAIR HFA/PROVENTIL HFA/VENTOLIN HFA    1 Inhaler    Inhale 2 puffs into the lungs every 4 hours as needed    Cough       azithromycin 250 MG tablet    ZITHROMAX    6 tablet    Two tablets first day, then one tablet daily for four days.    Cough, Reactive airway disease with acute exacerbation, unspecified asthma severity, unspecified whether persistent       cholecalciferol 1000 UNIT tablet    vitamin D3    100 tablet    Take 1 tablet (1,000 Units) by mouth daily    Avitaminosis D       diclofenac 50 MG EC tablet    VOLTAREN    90 tablet    TAKE 1 TABLET(50 MG) BY MOUTH THREE TIMES DAILY AS NEEDED FOR MODERATE PAIN    Osteoarthritis of knee, unspecified laterality, unspecified osteoarthritis type       fluticasone 110 MCG/ACT Inhaler    FLOVENT HFA    1 Inhaler    Inhale 2 puffs into  the lungs 2 times daily    Cough       guaiFENesin-codeine 100-10 MG/5ML Soln solution    ROBITUSSIN AC    120 mL    Take 5 mLs by mouth nightly as needed for cough    Cough       metoprolol 25 MG tablet    LOPRESSOR    180 tablet    Take 1 tablet (25 mg) by mouth 2 times daily    Hypertension goal BP (blood pressure) < 140/90       montelukast 10 MG tablet    SINGULAIR    90 tablet    Take 1 tablet (10 mg) by mouth At Bedtime    Cough       MULTI COMPLETE Caps     100 capsule    Take 1 capsule by mouth daily    Nutritional deficiency       order for DME     1 kit    Use as directed.    Essential hypertension with goal blood pressure less than 140/90       polyethylene glycol powder    MIRALAX    510 g    Take 17 g (1 capful) by mouth daily    Constipation, unspecified constipation type       ranitidine 150 MG tablet    ZANTAC    180 tablet    Take 1 tablet (150 mg) by mouth 2 times daily    Gastroesophageal reflux disease, esophagitis presence not specified       selenium sulfide 2.5 % lotion    SELSUN    118 mL    Apply to affected area and lather with small amounts of water; leave on skin for 10 minutes, then rinse thoroughly; repeat once every day for 7 days for 8 weeks.    Tinea versicolor

## 2017-12-21 ENCOUNTER — OFFICE VISIT (OUTPATIENT)
Dept: ALLERGY | Facility: CLINIC | Age: 62
End: 2017-12-21
Payer: COMMERCIAL

## 2017-12-21 ENCOUNTER — OFFICE VISIT (OUTPATIENT)
Dept: OTOLARYNGOLOGY | Facility: CLINIC | Age: 62
End: 2017-12-21
Payer: COMMERCIAL

## 2017-12-21 VITALS
DIASTOLIC BLOOD PRESSURE: 88 MMHG | HEART RATE: 70 BPM | HEIGHT: 64 IN | RESPIRATION RATE: 16 BRPM | WEIGHT: 171.52 LBS | BODY MASS INDEX: 29.28 KG/M2 | SYSTOLIC BLOOD PRESSURE: 142 MMHG

## 2017-12-21 VITALS
BODY MASS INDEX: 30.16 KG/M2 | HEART RATE: 78 BPM | DIASTOLIC BLOOD PRESSURE: 91 MMHG | WEIGHT: 171.6 LBS | SYSTOLIC BLOOD PRESSURE: 156 MMHG

## 2017-12-21 DIAGNOSIS — R05.3 CHRONIC COUGH: Primary | ICD-10-CM

## 2017-12-21 LAB
FEF 25/75: NORMAL
FEV-1: NORMAL
FEV1/FVC: NORMAL
FVC: NORMAL

## 2017-12-21 PROCEDURE — 94010 BREATHING CAPACITY TEST: CPT | Performed by: ALLERGY & IMMUNOLOGY

## 2017-12-21 PROCEDURE — 31575 DIAGNOSTIC LARYNGOSCOPY: CPT | Performed by: OTOLARYNGOLOGY

## 2017-12-21 PROCEDURE — 36415 COLL VENOUS BLD VENIPUNCTURE: CPT | Performed by: ALLERGY & IMMUNOLOGY

## 2017-12-21 PROCEDURE — 99000 SPECIMEN HANDLING OFFICE-LAB: CPT | Performed by: ALLERGY & IMMUNOLOGY

## 2017-12-21 PROCEDURE — 99214 OFFICE O/P EST MOD 30 MIN: CPT | Mod: 25 | Performed by: ALLERGY & IMMUNOLOGY

## 2017-12-21 PROCEDURE — 99214 OFFICE O/P EST MOD 30 MIN: CPT | Mod: 25 | Performed by: OTOLARYNGOLOGY

## 2017-12-21 PROCEDURE — 86003 ALLG SPEC IGE CRUDE XTRC EA: CPT | Performed by: ALLERGY & IMMUNOLOGY

## 2017-12-21 RX ORDER — CETIRIZINE HYDROCHLORIDE 10 MG/1
10 TABLET ORAL DAILY
Qty: 30 TABLET | Refills: 11 | Status: SHIPPED | OUTPATIENT
Start: 2017-12-21 | End: 2018-01-05

## 2017-12-21 RX ORDER — MONTELUKAST SODIUM 10 MG/1
10 TABLET ORAL AT BEDTIME
Qty: 30 TABLET | Refills: 1 | Status: SHIPPED | OUTPATIENT
Start: 2017-12-21 | End: 2018-03-30

## 2017-12-21 NOTE — NURSING NOTE
"Chief Complaint   Patient presents with     RECHECK     cough follow up       Initial /88 (BP Location: Left arm, Patient Position: Chair, Cuff Size: Adult Regular)  Pulse 70  Resp 16  Ht 1.613 m (5' 3.5\")  Wt 77.8 kg (171 lb 8.3 oz)  BMI 29.91 kg/m2 Estimated body mass index is 29.91 kg/(m^2) as calculated from the following:    Height as of this encounter: 1.613 m (5' 3.5\").    Weight as of this encounter: 77.8 kg (171 lb 8.3 oz).  Medication Reconciliation: complete     Quincy Estrada CMA      "

## 2017-12-21 NOTE — NURSING NOTE
"Chief Complaint   Patient presents with     Chronic Cough     comes and goes for several years, getting worse       Initial BP (!) 156/91 (Patient Position: Chair, Cuff Size: Adult Regular)  Pulse 78  Wt 77.8 kg (171 lb 9.6 oz)  BMI 30.16 kg/m2 Estimated body mass index is 30.16 kg/(m^2) as calculated from the following:    Height as of 5/17/17: 1.607 m (5' 3.25\").    Weight as of this encounter: 77.8 kg (171 lb 9.6 oz).  Medication Reconciliation: complete     Ellie Ball, [facility]      "

## 2017-12-21 NOTE — LETTER
12/21/2017         RE: Sanjuana Salamanca  [address]    [place] [address]        Dear Colleague,    Thank you for referring your patient, Sanjuana Salamanca, to the [facility]. Please see a copy of my visit note below.    Sanjuana Salamanca was seen in the Allergy Clinic at [facility]. The following are my recommendations regarding her Chronic Cough    1. Discontinue flovent  2. Begin dulera 100/5mcg, 2 puffs twice daily  3. Begin montelukast 10mg daily  4. Begin cetirizine 10mg daily  5. Continue albuterol HFA, 2-4 puffs every 4 hours as needed  6. Will obtain in vitro IgE testing for seasonal and perennial aeroallergens  7. Follow-up in 1 month      Sanjuana Salamanca is a 62 year old [place] female who is seen today for follow-up of chronic cough. She states that she missed her last appointment because her mother was sick and she had traveled to visit and care for her mother. Since her last visit Sanjuana has not been taking her medications regularly as she was more focused on caring for her mother and often forgot to take her medications. She has now resumed taking her medications regularly and finished a course of azithromycin that was prescribed for her last week. Sanjuana states that the cough is generally dry without sputum production and she currently denies having nasal or ocular symptoms including itchy/watery/red eyes, sneezing, rhinorrhea, or nasal congestion.    Chest Xray 9/8/17:    IMPRESSION: Cardiac silhouette and pulmonary vasculature are within  normal limits. No focal airspace disease, pleural effusion or  Pneumothorax.      REVIEW OF SYSTEMS:  General: negative for weight gain. negative for weight loss. negative for changes in sleep.   Eyes: negative for itching. negative for redness. negative for tearing/watering.  Ears: negative for fullness. negative for hearing loss. negative for dizziness.   Nose: negative for snoring.negative for changes in smell. negative for  drainage.   Throat: negative for hoarseness. negative for sore throat. negative for trouble swallowing.   Lungs: negative for shortness of breath.negative for wheezing. negative for sputum production.   Cardiovascular: negative for chest pain. negative for swelling of ankles. negative for fast or irregular heartbeat.   Gastrointestinal: negative for nausea. negative for heartburn. negative for acid reflux.   Musculoskeletal: negative for joint pain. negative for joint stiffness. negative for joint swelling.   Neurologic: negative for seizures. negative for fainting. negative for weakness.   Psychiatric: negative for changes in mood. negative for anxiety.   Endocrine: negative for cold intolerance. negative for heat intolerance. negative for tremors.   Hematologic: negative for easy bruising. negative for easy bleeding.  Integumentary: negative for rash. negative for scaling. negative for nail changes.       Current Outpatient Prescriptions:      montelukast (SINGULAIR) 10 MG tablet, Take 1 tablet (10 mg) by mouth At Bedtime, Disp: 90 tablet, Rfl: 0     ranitidine (ZANTAC) 150 MG tablet, Take 1 tablet (150 mg) by mouth 2 times daily, Disp: 180 tablet, Rfl: 1     acetaminophen (TYLENOL) 325 MG tablet, Take 1-2 tablets (325-650 mg) by mouth every 6 hours as needed for mild pain, Disp: 100 tablet, Rfl: 3     diclofenac (VOLTAREN) 50 MG EC tablet, TAKE 1 TABLET(50 MG) BY MOUTH THREE TIMES DAILY AS NEEDED FOR MODERATE PAIN, Disp: 90 tablet, Rfl: 0     Multiple Vitamins-Minerals (MULTI COMPLETE) CAPS, Take 1 capsule by mouth daily, Disp: 100 capsule, Rfl: 3     metoprolol (LOPRESSOR) 25 MG tablet, Take 1 tablet (25 mg) by mouth 2 times daily, Disp: 180 tablet, Rfl: 0     fluticasone (FLOVENT HFA) 110 MCG/ACT Inhaler, Inhale 2 puffs into the lungs 2 times daily, Disp: 1 Inhaler, Rfl: 1     albuterol (PROAIR HFA/PROVENTIL HFA/VENTOLIN HFA) 108 (90 BASE) MCG/ACT Inhaler, Inhale 2 puffs into the lungs every 4 hours as needed,  "Disp: 1 Inhaler, Rfl: 1     selenium sulfide (SELSUN) 2.5 % lotion, Apply to affected area and lather with small amounts of water; leave on skin for 10 minutes, then rinse thoroughly; repeat once every day for 7 days for 8 weeks., Disp: 118 mL, Rfl: 1     cholecalciferol (VITAMIN D3) 1000 UNIT tablet, Take 1 tablet (1,000 Units) by mouth daily, Disp: 100 tablet, Rfl: 3     order for DME, Use as directed., Disp: 1 kit, Rfl: 0     polyethylene glycol (MIRALAX) powder, Take 17 g (1 capful) by mouth daily, Disp: 510 g, Rfl: 1     azithromycin (ZITHROMAX) 250 MG tablet, Two tablets first day, then one tablet daily for four days. (Patient not taking: Reported on 12/21/2017), Disp: 6 tablet, Rfl: 0     guaiFENesin-codeine (ROBITUSSIN AC) 100-10 MG/5ML SOLN solution, Take 5 mLs by mouth nightly as needed for cough (Patient not taking: Reported on 12/21/2017), Disp: 120 mL, Rfl: 0    EXAM:   /88 (BP Location: Left arm, Patient Position: Chair, Cuff Size: Adult Regular)  Pulse 70  Resp 16  Ht 1.613 m (5' 3.5\")  Wt 77.8 kg (171 lb 8.3 oz)  BMI 29.91 kg/m2  GENERAL APPEARANCE: alert, cooperative and not in distress  SKIN: no rashes, no lesions  HEAD: atraumatic, normocephalic  ENT: no scars or lesions, nasal exam showed no discharge, swelling or lesions noted, tongue midline and normal, soft palate, uvula, and tonsils normal  NECK: no asymmetry, masses, or scars, supple without significant adenopathy  LUNGS: unlabored respirations, no intercostal retractions or accessory muscle use, clear to auscultation without rales or wheezes  HEART: regular rate and rhythm without murmurs and normal S1 and S2  MUSCULOSKELETAL: no musculoskeletal defects are noted  NEURO: no focal deficits noted  PSYCH: does not appear depressed or anxious      WORKUP:  Spirometry  SPIROMETRY       FVC 1.89L (75% of predicted).     FEV1 1.66L (84% of predicted).     FEV1/FVC 88%     FEF 25%-75%  2.36L/s (122% of predicted).    These values are " consistent with a restrictive pattern. There has been improvement in FVC and FEV1 when compared to values obtained on 10/16/17.    ASSESSMENT/PLAN:  Sanjuana Salamanca is a 62 year old female here for follow-up of chronic cough. She had not been regularly taking her medications over the last 2 months but has returned home and is taking the flovent twice daily. She has had recurrent, prolonged, coughing episodes in the past but none that have been this severe. Her cough may have been triggered by post-infectious inflammation vs poorly controlled asthma or allergies. Other potential causes of chronic cough include chronic sinusitis and GERD. We discussed pursuing further evaluation for underlying allergies as well as making adjustments in her medication to further treat potential asthma and allergic rhinitis.    1. Discontinue flovent  2. Begin dulera 100/5mcg, 2 puffs twice daily  3. Begin montelukast 10mg daily  4. Begin cetirizine 10mg daily  5. Continue albuterol HFA, 2-4 puffs every 4 hours as needed  6. Will obtain in vitro IgE testing for seasonal and perennial aeroallergens  7. Follow-up in 1 month      Juani Guerrero MD  Allergy/Immunology  [place], [place]      Chart documentation done in part with Dragon Voice Recognition Software. Although reviewed after completion, some word and grammatical errors may remain.      Again, thank you for allowing me to participate in the care of your patient.        Sincerely,        Juani Guerrero MD

## 2017-12-21 NOTE — LETTER
12/21/2017         RE: Sanjuana Salamanca  [address]    [place] [address]        Dear Colleague,    Thank you for referring your patient, Sanjuana Salamanca, to the [facility]. Please see a copy of my visit note below.        History of Present Illness - Sanjuana Salamanca is a 62 year old female who was seen in the past by Dr. Mckinney for tinnitus, now presents with concerns about a cough. The cough has been intermittent and usually nonproductive. She has tried Guaifenesin with codeine. She also has an allergy workup pending. Patient reports that the cough has been intermittent for the past 2-3 years. Patient reports that normally she has 1 episode lasting 1 month per year, this year she has 2 month long episodes.      Past Medical History -   Patient Active Problem List   Diagnosis     Avitaminosis D     Esophageal reflux     OA (osteoarthritis) of knee     Advanced directives, counseling/discussion     Hyperlipidemia LDL goal <160     Cataracts, both eyes     Dry eyes     Hypokalemia     Prediabetes     Essential hypertension with goal blood pressure less than 140/90     Current Medications -   Current Outpatient Prescriptions:      montelukast (SINGULAIR) 10 MG tablet, Take 1 tablet (10 mg) by mouth At Bedtime, Disp: 90 tablet, Rfl: 0     ranitidine (ZANTAC) 150 MG tablet, Take 1 tablet (150 mg) by mouth 2 times daily, Disp: 180 tablet, Rfl: 1     acetaminophen (TYLENOL) 325 MG tablet, Take 1-2 tablets (325-650 mg) by mouth every 6 hours as needed for mild pain, Disp: 100 tablet, Rfl: 3     diclofenac (VOLTAREN) 50 MG EC tablet, TAKE 1 TABLET(50 MG) BY MOUTH THREE TIMES DAILY AS NEEDED FOR MODERATE PAIN, Disp: 90 tablet, Rfl: 0     Multiple Vitamins-Minerals (MULTI COMPLETE) CAPS, Take 1 capsule by mouth daily, Disp: 100 capsule, Rfl: 3     metoprolol (LOPRESSOR) 25 MG tablet, Take 1 tablet (25 mg) by mouth 2 times daily, Disp: 180 tablet, Rfl: 0     fluticasone (FLOVENT HFA) 110 MCG/ACT  Inhaler, Inhale 2 puffs into the lungs 2 times daily, Disp: 1 Inhaler, Rfl: 1     albuterol (PROAIR HFA/PROVENTIL HFA/VENTOLIN HFA) 108 (90 BASE) MCG/ACT Inhaler, Inhale 2 puffs into the lungs every 4 hours as needed, Disp: 1 Inhaler, Rfl: 1     selenium sulfide (SELSUN) 2.5 % lotion, Apply to affected area and lather with small amounts of water; leave on skin for 10 minutes, then rinse thoroughly; repeat once every day for 7 days for 8 weeks., Disp: 118 mL, Rfl: 1     cholecalciferol (VITAMIN D3) 1000 UNIT tablet, Take 1 tablet (1,000 Units) by mouth daily, Disp: 100 tablet, Rfl: 3     order for DME, Use as directed., Disp: 1 kit, Rfl: 0     polyethylene glycol (MIRALAX) powder, Take 17 g (1 capful) by mouth daily, Disp: 510 g, Rfl: 1     azithromycin (ZITHROMAX) 250 MG tablet, Two tablets first day, then one tablet daily for four days. (Patient not taking: Reported on 12/21/2017), Disp: 6 tablet, Rfl: 0     guaiFENesin-codeine (ROBITUSSIN AC) 100-10 MG/5ML SOLN solution, Take 5 mLs by mouth nightly as needed for cough (Patient not taking: Reported on 12/21/2017), Disp: 120 mL, Rfl: 0    Allergies -   Allergies   Allergen Reactions     Valsartan Cough       Social History -   Social History     Social History     Marital status:      Spouse name: N/A     Number of children: N/A     Years of education: N/A     Social History Main Topics     Smoking status: Never Smoker     Smokeless tobacco: Never Used     Alcohol use No     Drug use: No     Sexual activity: No     Other Topics Concern     Parent/Sibling W/ Cabg, Mi Or Angioplasty Before 65f 55m? No     Social History Narrative     Family History -   Family History   Problem Relation Age of Onset     Hypertension Mother      Thyroid Disease Mother      DIABETES Other      CEREBROVASCULAR DISEASE Other      Glaucoma No family hx of      Macular Degeneration No family hx of      CANCER No family hx of        Review of Systems - As per HPI and PMHx, otherwise 7  system review of the head and neck negative. 10+ system review negative.    Physical Exam  BP (!) 156/91 (Patient Position: Chair, Cuff Size: Adult Regular)  Pulse 78  Wt 77.8 kg (171 lb 9.6 oz)  BMI 30.16 kg/m2  General - The patient is well nourished and well developed, and appears to have good nutritional status.  Alert and oriented to person and place, answers questions and cooperates with examination appropriately.   Head and Face - Normocephalic and atraumatic, with no gross asymmetry noted of the contour of the facial features.  The facial nerve is intact, with strong symmetric movements.  Voice and Breathing - The patient was breathing comfortably without the use of accessory muscles. There was no wheezing, stridor, or stertor.  The patients voice was clear and strong, and had appropriate pitch and quality.  Ears - Bilateral pinna and EACs with normal appearing overlying skin. Tympanic membrane intact with good mobility on pneumatic otoscopy bilaterally. Bony landmarks of the ossicular chain are normal. The tympanic membranes are normal in appearance. No retraction, perforation, or masses.  No fluid or purulence was seen in the external canal or the middle ear. Cerumen on left TM.  Eyes - Extraocular movements intact.  Sclera were not icteric or injected, conjunctiva were pink and moist.  Mouth - Examination of the oral cavity showed pink, healthy oral mucosa. No lesions or ulcerations noted.  The tongue was mobile and midline, and the dentition were in good condition.    Throat - The walls of the oropharynx were smooth, pink, moist, symmetric, and had no lesions or ulcerations.  The tonsillar pillars and soft palate were symmetric.  The uvula was midline on elevation.  Neck - Normal midline excursion of the laryngotracheal complex during swallowing.  Full range of motion on passive movement.  Palpation of the occipital, submental, submandibular, internal jugular chain, and supraclavicular nodes did not  demonstrate any abnormal lymph nodes or masses.  The carotid pulse was palpable bilaterally.  Palpation of the thyroid was soft and smooth, with no nodules or goiter appreciated.  The trachea was mobile and midline.  Nose - External contour is symmetric, no gross deflection or scars.  Nasal mucosa is pink and moist with no abnormal mucus.  The septum was midline and non-obstructive, turbinates of normal size and position.  No polyps, masses, or purulence noted on examination.      Procedure: Flexible Endoscopy  Indication: Chronic cough    Attempts at mirror laryngoscopy were not possible due to gag reflex.  Therefore I proceeded with a fiberoptic examination.  First I sprayed both sides of the nose with a mixture of lidocaine and neosynephrine.  I then passed the scope through the nasal cavity. The nasal cavity was unremarkable. The nasopharynx was mucosally covered and symmetric.  The Eustachian tube openings were unobstructed. Going further down I had a clear view of the base of tongue which had normal appearing lingual tonsillar tissue. The base of tongue was free of lesions, and the vallecula was open. The epiglottis was smooth and mucosally covered.  The supraglottic larynx was then clearly visualized. The vocal cords moved smoothly and symmetrically, they were pearly white and no lesions were seen. The pyriform sinuses were open, and the limited view of the postcricoid region did not show any lesions.      Assessment - Sanjuana Salamanca is a 62 year old female with chronic cough, but normal laryngeal exam. Discussed the possibility of chronic sinusitis that would be diagnosed by a CT scan of the sinuses, but she is not interested in any surgical workup for this issue, so we declined to move forward with CT. I suspect she likely is having postviral cough syndrome as it seems to occur sporadically 1-2 times per year and last around a month. I encouraged her to follow through with the allergy workup. Should there be  further concern for sinus disease, a CT Sinus could be ordered at that time.      This document serves as a record of the services and decisions personally performed and made by Dr. Adriana Torres MD. It was created on his behalf by Ping Jack, a trained medical scribe. The creation of this document is based the provider's statements to the medical scribe.  Ping Jack 3:11 PM 12/21/2017    Provider:   The information in this document, created by the medical scribe for me, accurately reflects the services I personally performed and the decisions made by me. I have reviewed and approved this document for accuracy prior to leaving the patient care area.  Dr. Adriana Torres MD 3:11 PM 12/21/2017    Dr. Adriana Torres MD  Otolaryngology  [facility]        Again, thank you for allowing me to participate in the care of your patient.        Sincerely,        Adriana Torres MD

## 2017-12-21 NOTE — PATIENT INSTRUCTIONS
Scheduling Information  To schedule your CT/MRI scan, please contact [facility] at [phone] OR [facility] at [phone]    To schedule your Surgery, please contact our Specialty Schedulers at [phone]    ** If a CT scan or biopsy were ordered/done, Dr. Torres will need to see you back in clinic to go over your biopsy results or CT/MRI scan. He will go over the images from your scan with you and discuss treatment based on your results.     ENT Clinic Locations Clinic Hours Telephone Number     [facility]  [address]  [place] [address]   E/O Thursday:      7:30am -- 4:00pm   To schedule/reschedule an appointment with   Dr. Torres,   please contact our   Specialty Scheduling Department at:     [phone]       [facility]  [address] [place] [address]     Monday:              12:00pm -- 4:00pm    Tuesday:               8:30am -- 4:30pm    Wednesday:        12:00pm -- 4:00pm    E/O Thursday:        8:00am - 4:30pm           Urgent Care Locations Clinic Hours Telephone Numbers     [facility] [place]  [address]  [place] [address]     Monday-Friday:     11:00am - 9:00pm    Saturday-Sunday:  9:00am - 5:00pm   [phone]     [facility]  [address] [place] [address]     Monday-Friday:      5:00pm - 9:00pm     Saturday-Sunday:  9:00am - 5:00pm   [phone]

## 2017-12-21 NOTE — PROGRESS NOTES
Sanjuana Salamanca was seen in the Allergy Clinic at [facility]. The following are my recommendations regarding her Chronic Cough    1. Discontinue flovent  2. Begin dulera 100/5mcg, 2 puffs twice daily  3. Begin montelukast 10mg daily  4. Begin cetirizine 10mg daily  5. Continue albuterol HFA, 2-4 puffs every 4 hours as needed  6. Will obtain in vitro IgE testing for seasonal and perennial aeroallergens  7. Follow-up in 1 month      Sanjuana Salamanca is a 62 year old [place] female who is seen today for follow-up of chronic cough. She states that she missed her last appointment because her mother was sick and she had traveled to visit and care for her mother. Since her last visit Sanjuana has not been taking her medications regularly as she was more focused on caring for her mother and often forgot to take her medications. She has now resumed taking her medications regularly and finished a course of azithromycin that was prescribed for her last week. Sanjuana states that the cough is generally dry without sputum production and she currently denies having nasal or ocular symptoms including itchy/watery/red eyes, sneezing, rhinorrhea, or nasal congestion.    Chest Xray 9/8/17:    IMPRESSION: Cardiac silhouette and pulmonary vasculature are within  normal limits. No focal airspace disease, pleural effusion or  Pneumothorax.      REVIEW OF SYSTEMS:  General: negative for weight gain. negative for weight loss. negative for changes in sleep.   Eyes: negative for itching. negative for redness. negative for tearing/watering.  Ears: negative for fullness. negative for hearing loss. negative for dizziness.   Nose: negative for snoring.negative for changes in smell. negative for drainage.   Throat: negative for hoarseness. negative for sore throat. negative for trouble swallowing.   Lungs: negative for shortness of breath.negative for wheezing. negative for sputum production.   Cardiovascular: negative for chest pain. negative  for swelling of ankles. negative for fast or irregular heartbeat.   Gastrointestinal: negative for nausea. negative for heartburn. negative for acid reflux.   Musculoskeletal: negative for joint pain. negative for joint stiffness. negative for joint swelling.   Neurologic: negative for seizures. negative for fainting. negative for weakness.   Psychiatric: negative for changes in mood. negative for anxiety.   Endocrine: negative for cold intolerance. negative for heat intolerance. negative for tremors.   Hematologic: negative for easy bruising. negative for easy bleeding.  Integumentary: negative for rash. negative for scaling. negative for nail changes.       Current Outpatient Prescriptions:      montelukast (SINGULAIR) 10 MG tablet, Take 1 tablet (10 mg) by mouth At Bedtime, Disp: 90 tablet, Rfl: 0     ranitidine (ZANTAC) 150 MG tablet, Take 1 tablet (150 mg) by mouth 2 times daily, Disp: 180 tablet, Rfl: 1     acetaminophen (TYLENOL) 325 MG tablet, Take 1-2 tablets (325-650 mg) by mouth every 6 hours as needed for mild pain, Disp: 100 tablet, Rfl: 3     diclofenac (VOLTAREN) 50 MG EC tablet, TAKE 1 TABLET(50 MG) BY MOUTH THREE TIMES DAILY AS NEEDED FOR MODERATE PAIN, Disp: 90 tablet, Rfl: 0     Multiple Vitamins-Minerals (MULTI COMPLETE) CAPS, Take 1 capsule by mouth daily, Disp: 100 capsule, Rfl: 3     metoprolol (LOPRESSOR) 25 MG tablet, Take 1 tablet (25 mg) by mouth 2 times daily, Disp: 180 tablet, Rfl: 0     fluticasone (FLOVENT HFA) 110 MCG/ACT Inhaler, Inhale 2 puffs into the lungs 2 times daily, Disp: 1 Inhaler, Rfl: 1     albuterol (PROAIR HFA/PROVENTIL HFA/VENTOLIN HFA) 108 (90 BASE) MCG/ACT Inhaler, Inhale 2 puffs into the lungs every 4 hours as needed, Disp: 1 Inhaler, Rfl: 1     selenium sulfide (SELSUN) 2.5 % lotion, Apply to affected area and lather with small amounts of water; leave on skin for 10 minutes, then rinse thoroughly; repeat once every day for 7 days for 8 weeks., Disp: 118 mL, Rfl:  "1     cholecalciferol (VITAMIN D3) 1000 UNIT tablet, Take 1 tablet (1,000 Units) by mouth daily, Disp: 100 tablet, Rfl: 3     order for DME, Use as directed., Disp: 1 kit, Rfl: 0     polyethylene glycol (MIRALAX) powder, Take 17 g (1 capful) by mouth daily, Disp: 510 g, Rfl: 1     azithromycin (ZITHROMAX) 250 MG tablet, Two tablets first day, then one tablet daily for four days. (Patient not taking: Reported on 12/21/2017), Disp: 6 tablet, Rfl: 0     guaiFENesin-codeine (ROBITUSSIN AC) 100-10 MG/5ML SOLN solution, Take 5 mLs by mouth nightly as needed for cough (Patient not taking: Reported on 12/21/2017), Disp: 120 mL, Rfl: 0    EXAM:   /88 (BP Location: Left arm, Patient Position: Chair, Cuff Size: Adult Regular)  Pulse 70  Resp 16  Ht 1.613 m (5' 3.5\")  Wt 77.8 kg (171 lb 8.3 oz)  BMI 29.91 kg/m2  GENERAL APPEARANCE: alert, cooperative and not in distress  SKIN: no rashes, no lesions  HEAD: atraumatic, normocephalic  ENT: no scars or lesions, nasal exam showed no discharge, swelling or lesions noted, tongue midline and normal, soft palate, uvula, and tonsils normal  NECK: no asymmetry, masses, or scars, supple without significant adenopathy  LUNGS: unlabored respirations, no intercostal retractions or accessory muscle use, clear to auscultation without rales or wheezes  HEART: regular rate and rhythm without murmurs and normal S1 and S2  MUSCULOSKELETAL: no musculoskeletal defects are noted  NEURO: no focal deficits noted  PSYCH: does not appear depressed or anxious      WORKUP:  Spirometry  SPIROMETRY       FVC 1.89L (75% of predicted).     FEV1 1.66L (84% of predicted).     FEV1/FVC 88%     FEF 25%-75%  2.36L/s (122% of predicted).    These values are consistent with a restrictive pattern. There has been improvement in FVC and FEV1 when compared to values obtained on 10/16/17.    ASSESSMENT/PLAN:  Sanjuana Salamanca is a 62 year old female here for follow-up of chronic cough. She had not been regularly " taking her medications over the last 2 months but has returned home and is taking the flovent twice daily. She has had recurrent, prolonged, coughing episodes in the past but none that have been this severe. Her cough may have been triggered by post-infectious inflammation vs poorly controlled asthma or allergies. Other potential causes of chronic cough include chronic sinusitis and GERD. We discussed pursuing further evaluation for underlying allergies as well as making adjustments in her medication to further treat potential asthma and allergic rhinitis.    1. Discontinue flovent  2. Begin dulera 100/5mcg, 2 puffs twice daily  3. Begin montelukast 10mg daily  4. Begin cetirizine 10mg daily  5. Continue albuterol HFA, 2-4 puffs every 4 hours as needed  6. Will obtain in vitro IgE testing for seasonal and perennial aeroallergens  7. Follow-up in 1 month      Juani Guerrero MD  Allergy/Immunology  [facility] [place], [place]      Chart documentation done in part with Dragon Voice Recognition Software. Although reviewed after completion, some word and grammatical errors may remain.

## 2017-12-21 NOTE — PATIENT INSTRUCTIONS
If you have any questions regarding your allergies, asthma, or what we discussed during your visit today please call the allergy clinic or contact us via [employer].    [facility]: [phone]      Follow-up in 1 month    Take the following medications for your cough:    1. Dulera (blue inhaler) - Take 2 puffs in the morning and 2 puffs at night. Use with the spacer. Rinse your mouth and brush your teeth afterwards. Take this medicine every day.    2. Ventolin (albuterol - blue inhaler) - Take 2 puffs every 4 hours as needed for cough, shortness of breath, or wheezing. If you are not coughing you do not have to take this medicine.    3. Singulair (montelukast) 10mg tablet - Take 1 tablet at bedtime    4. Zyrtec (cetirizine) 10mg tablet - Take 1 tablet at bedtime      Stop taking the Flovent (orange inhaler)        Using an Inhaler with a Spacer  To control asthma, you need to use your medicines the right way. Some medicines are inhaled using a device called a metered-dose inhaler (MDI). Metered-dose inhalers deliver medicine with a fine spray. You may be asked to use a spacer (holding tube) with your inhaler. The spacer helps make sure all the medicine you need goes into your lungs.   Steps for using an inhaler with a spacer  Step 1:    Remove the caps from the inhaler and spacer.    Shake the inhaler well and attach the spacer. If the inhaler is being used for the first time or has not been used for a while, prime it as directed by the product maker.  Step 2:    Breathe out normally.    Put the spacer between your teeth. Close your lips tightly around it.    Keep your chin up.  Step 3:    Spray 1 puff into the spacer by pressing down on the inhaler.    Then breathe in through your mouth as slowly and deeply as you can. This should take about 5-10 seconds. If you breathe too quickly, you may hear a whistling sound in certain spacers.  Step 4:    Take the spacer out of your mouth.    Hold your breath for  a count of 10.    Then hold your lips together and slowly breathe out through your mouth.          If you re prescribed more than 1 puff of medicine at a time, wait at least 30 seconds between puffs. This number may be different for different medicines. Shake the inhaler again. Then repeat steps 2 to 4.   Date Last Reviewed: 10/1/2016    [id number] [employer]. [address] [place] [address], [place] [address]. All rights reserved. This information is not intended as a substitute for professional medical care. Always follow your healthcare professional's instructions.

## 2017-12-21 NOTE — MR AVS SNAPSHOT
After Visit Summary   12/21/2017    Sanjuana Salamanca    MRN: [id number]           Patient Information     Date Of Birth          1955        Visit Information        Provider Department      12/21/2017 3:20 PM Juani Guerrero MD [facility]        Today's Diagnoses     Chronic cough    -  1      Care Instructions    If you have any questions regarding your allergies, asthma, or what we discussed during your visit today please call the allergy clinic or contact us via [employer].    [facility] All[facility]: [phone]      Follow-up in 1 month    Take the following medications for your cough:    1. Dulera (blue inhaler) - Take 2 puffs in the morning and 2 puffs at night. Use with the spacer. Rinse your mouth and brush your teeth afterwards. Take this medicine every day.    2. Ventolin (albuterol - blue inhaler) - Take 2 puffs every 4 hours as needed for cough, shortness of breath, or wheezing. If you are not coughing you do not have to take this medicine.    3. Singulair (montelukast) 10mg tablet - Take 1 tablet at bedtime    4. Zyrtec (cetirizine) 10mg tablet - Take 1 tablet at bedtime      Stop taking the Flovent (orange inhaler)        Using an Inhaler with a Spacer  To control asthma, you need to use your medicines the right way. Some medicines are inhaled using a device called a metered-dose inhaler (MDI). Metered-dose inhalers deliver medicine with a fine spray. You may be asked to use a spacer (holding tube) with your inhaler. The spacer helps make sure all the medicine you need goes into your lungs.   Steps for using an inhaler with a spacer  Step 1:    Remove the caps from the inhaler and spacer.    Shake the inhaler well and attach the spacer. If the inhaler is being used for the first time or has not been used for a while, prime it as directed by the product maker.  Step 2:    Breathe out normally.    Put the spacer between your teeth. Close your lips tightly around it.    Keep  your chin up.  Step 3:    Spray 1 puff into the spacer by pressing down on the inhaler.    Then breathe in through your mouth as slowly and deeply as you can. This should take about 5-10 seconds. If you breathe too quickly, you may hear a whistling sound in certain spacers.  Step 4:    Take the spacer out of your mouth.    Hold your breath for a count of 10.    Then hold your lips together and slowly breathe out through your mouth.          If you re prescribed more than 1 puff of medicine at a time, wait at least 30 seconds between puffs. This number may be different for different medicines. Shake the inhaler again. Then repeat steps 2 to 4.   Date Last Reviewed: 10/1/2016    0650-9367 [employer]. [address] [place] [address], [place] [address]. All rights reserved. This information is not intended as a substitute for professional medical care. Always follow your healthcare professional's instructions.                Follow-ups after your visit        Follow-up notes from your care team     Return in about 4 weeks (around 1/18/2018).      Who to contact     If you have questions or need follow up information about today's clinic visit or your schedule please contact [facility] directly at [phone].  Normal or non-critical lab and imaging results will be communicated to you by [employer], letter or phone within 4 business days after the clinic has received the results. If you do not hear from us within 7 days, please contact the clinic through [employer] or phone. If you have a critical or abnormal lab result, we will notify you by phone as soon as possible.  Submit refill requests through [employer] or call your pharmacy and they will forward the refill request to us. Please allow 3 business days for your refill to be completed.          Additional Information About Your Visit        [employer] Information     [employer] lets you send messages to your doctor, view your test results, renew your  "prescriptions, schedule appointments and more. To sign up, go to [email or web address] . Click on \"Log in\" on the left side of the screen, which will take you to the Welcome page. Then click on \"Sign up Now\" on the right side of the page.     You will be asked to enter the access code listed below, as well as some personal information. Please follow the directions to create your username and password.     Your access code is: [email or web address]  Expires: 2018  2:29 PM     Your access code will  in 90 days. If you need help or a new code, please call your [place] clinic or [phone].        Care EveryWhere ID     This is your Care EveryWhere ID. This could be used by other organizations to access your [place] medical records  [id number]        Your Vitals Were     Pulse Respirations Height BMI (Body Mass Index)          70 16 1.613 m (5' 3.5\") 29.91 kg/m2         Blood Pressure from Last 3 Encounters:   17 142/88   17 (!) 156/91   12/15/17 135/82    Weight from Last 3 Encounters:   17 77.8 kg (171 lb 8.3 oz)   17 77.8 kg (171 lb 9.6 oz)   12/15/17 77.1 kg (170 lb)              We Performed the Following     Allergen alternaria alternata IgE     Allergen monica white IgE     Allergen aspergillus fumigatus IgE     Allergen cat epithellium IgE     Allergen Cedar IgE     Allergen cladosporium herbarum IgE     Allergen cottonwood IgE     Allergen D farinae IgE     Allergen D pteronyssinus IgE     Allergen dog epithelium IgE     Allergen elm IgE     Allergen English plantain IgE     Allergen Epicoccum purpurascens IgE     Allergen giant ragweed IgE     Allergen Pepito grass IgE     Allergen lamb's quarter IgE     Allergen maple box elder IgE     Allergen Mugwort IgE     Allergen oak white IgE     Allergen orchard grass IgE     Allergen penicillium notatum IgE     Allergen ragweed short IgE     Allergen Red Woodlake IgE     Allergen Sagebrush Wormwood IgE     Allergen Sheep Summer Set IgE     " Allergen silver  birch IgE     Allergen thistle Russian IgE     Allergen terell IgE     Allergen Tree White Berkeley IgE     Allergen Saxis Tree     Allergen Weed Nettle IgE     Allergen white pine IgE     Allergen, Kochia/Firebush     Spirometry, Breathing Capacity          Today's Medication Changes          These changes are accurate as of: 12/21/17  4:09 PM.  If you have any questions, ask your nurse or doctor.               Start taking these medicines.        Dose/Directions    cetirizine 10 MG tablet   Commonly known as:  zyrTEC   Used for:  Chronic cough   Started by:  Juani Guerrero MD        Dose:  10 mg   Take 1 tablet (10 mg) by mouth daily   Quantity:  30 tablet   Refills:  11       mometasone-formoterol 100-5 MCG/ACT oral inhaler   Commonly known as:  DULERA   Used for:  Chronic cough   Started by:  Juani Guerrero MD        Dose:  2 puff   Inhale 2 puffs into the lungs 2 times daily   Quantity:  1 Inhaler   Refills:  1         Stop taking these medicines if you haven't already. Please contact your care team if you have questions.     fluticasone 110 MCG/ACT Inhaler   Commonly known as:  FLOVENT HFA   Stopped by:  Juani Guerrero MD                Where to get your medicines      These medications were sent to [facility] [address] [place], [place] - [address] AT [facility] [address]  [address] [facility] [address]     Phone:  [phone]     cetirizine 10 MG tablet    mometasone-formoterol 100-5 MCG/ACT oral inhaler    montelukast 10 MG tablet                Primary Care Provider Office Phone # Fax #    Sweetie Brayden Gao [phone] [address] [place], Susan. \[facility] Outlook.\"" [id number]        Equal Access to Services     [facility]: Hadii sravanthi gray hadfrancisco j Soarchana, waaxda luqadaha, qaybta kaalmada adeegyada, talisha feliz. So [facility] [phone].    ATENCIÓN: Si habla español, tiene a ross disposición servicios gratuitos de  asistencia lingüística. Jeremiah al [phone].    We comply with applicable federal civil rights laws and [place] laws. We do not discriminate on the basis of race, color, national origin, age, disability, sex, sexual orientation, or gender identity.            Thank you!     Thank you for choosing [facility]  for your care. Our goal is always to provide you with excellent care. Hearing back from our patients is one way we can continue to improve our services. Please take a few minutes to complete the written survey that you may receive in the mail after your visit with us. Thank you!             Your Updated Medication List - Protect others around you: Learn how to safely use, store and throw away your medicines at www.disposemymeds.org.          This list is accurate as of: 12/21/17  4:09 PM.  Always use your most recent med list.                   Brand Name Dispense Instructions for use Diagnosis    acetaminophen 325 MG tablet    TYLENOL    100 tablet    Take 1-2 tablets (325-650 mg) by mouth every 6 hours as needed for mild pain    Primary osteoarthritis of both knees       albuterol 108 (90 BASE) MCG/ACT Inhaler    PROAIR HFA/PROVENTIL HFA/VENTOLIN HFA    1 Inhaler    Inhale 2 puffs into the lungs every 4 hours as needed    Cough       cetirizine 10 MG tablet    zyrTEC    30 tablet    Take 1 tablet (10 mg) by mouth daily    Chronic cough       cholecalciferol 1000 UNIT tablet    vitamin D3    100 tablet    Take 1 tablet (1,000 Units) by mouth daily    Avitaminosis D       diclofenac 50 MG EC tablet    VOLTAREN    90 tablet    TAKE 1 TABLET(50 MG) BY MOUTH THREE TIMES DAILY AS NEEDED FOR MODERATE PAIN    Osteoarthritis of knee, unspecified laterality, unspecified osteoarthritis type       guaiFENesin-codeine 100-10 MG/5ML Soln solution    ROBITUSSIN AC    120 mL    Take 5 mLs by mouth nightly as needed for cough    Cough       metoprolol 25 MG tablet    LOPRESSOR    180 tablet    Take 1 tablet  (25 mg) by mouth 2 times daily    Hypertension goal BP (blood pressure) < 140/90       mometasone-formoterol 100-5 MCG/ACT oral inhaler    DULERA    1 Inhaler    Inhale 2 puffs into the lungs 2 times daily    Chronic cough       montelukast 10 MG tablet    SINGULAIR    30 tablet    Take 1 tablet (10 mg) by mouth At Bedtime    Chronic cough       MULTI COMPLETE Caps     100 capsule    Take 1 capsule by mouth daily    Nutritional deficiency       order for DME     1 kit    Use as directed.    Essential hypertension with goal blood pressure less than 140/90       polyethylene glycol powder    MIRALAX    510 g    Take 17 g (1 capful) by mouth daily    Constipation, unspecified constipation type       ranitidine 150 MG tablet    ZANTAC    180 tablet    Take 1 tablet (150 mg) by mouth 2 times daily    Gastroesophageal reflux disease, esophagitis presence not specified       selenium sulfide 2.5 % lotion    SELSUN    118 mL    Apply to affected area and lather with small amounts of water; leave on skin for 10 minutes, then rinse thoroughly; repeat once every day for 7 days for 8 weeks.    Tinea versicolor

## 2017-12-23 LAB
CALIF WALNUT IGE QN: <0.1 KU/L
DEPRECATED MISC ALLERGEN IGE RAST QL: NORMAL

## 2017-12-28 LAB
A ALTERNATA IGE QN: <0.1 KU(A)/L
A FUMIGATUS IGE QN: <0.1 KU(A)/L
C HERBARUM IGE QN: <0.1 KU(A)/L
CAT DANDER IGG QN: 0.36 KU(A)/L
CEDAR IGE QN: <0.1 KU(A)/L
COCKSFOOT IGE QN: <0.1 KU(A)/L
COMMON RAGWEED IGE QN: <0.1 KU(A)/L
COTTONWOOD IGE QN: <0.1 KU(A)/L
D FARINAE IGE QN: <0.1 KU(A)/L
D PTERONYSS IGE QN: <0.1 KU(A)/L
DOG DANDER+EPITH IGE QN: <0.1 KU(A)/L
E PURPURASCENS IGE QN: <0.1 KU(A)/L
EAST WHITE PINE IGE QN: <0.1 KU(A)/L
ENGL PLANTAIN IGE QN: <0.1 KU(A)/L
FIREBUSH IGE QN: <0.1 KU(A)/L
GIANT RAGWEED IGE QN: <0.1 KU(A)/L
GOOSEFOOT IGE QN: <0.1 KU(A)/L
JOHNSON GRASS IGE QN: <0.1 KU(A)/L
MAPLE IGE QN: <0.1 KU(A)/L
MUGWORT IGE QN: <0.1 KU(A)/L
NETTLE IGE QN: <0.1 KU(A)/L
P NOTATUM IGE QN: <0.1 KU(A)/L
RED MULBERRY IGE QN: <0.1 KU(A)/L
SALTWORT IGE QN: <0.1 KU(A)/L
SHEEP SORREL IGE QN: <0.1 KU(A)/L
SILVER BIRCH IGE QN: <0.1 KU(A)/L
TIMOTHY IGE QN: <0.1 KU(A)/L
WHITE ASH IGE QN: <0.1 KU(A)/L
WHITE ELM IGE QN: <0.1 KU(A)/L
WHITE MULBERRY IGE QN: <0.1
WHITE OAK IGE QN: <0.1 KU(A)/L
WORMWOOD IGE QN: <0.1 KU(A)/L

## 2018-01-05 ENCOUNTER — TELEPHONE (OUTPATIENT)
Dept: ALLERGY | Facility: CLINIC | Age: 63
End: 2018-01-05

## 2018-01-05 ENCOUNTER — OFFICE VISIT (OUTPATIENT)
Dept: FAMILY MEDICINE | Facility: CLINIC | Age: 63
End: 2018-01-05
Payer: COMMERCIAL

## 2018-01-05 VITALS
HEART RATE: 79 BPM | DIASTOLIC BLOOD PRESSURE: 81 MMHG | SYSTOLIC BLOOD PRESSURE: 147 MMHG | HEIGHT: 64 IN | WEIGHT: 168 LBS | OXYGEN SATURATION: 96 % | BODY MASS INDEX: 28.68 KG/M2 | TEMPERATURE: 97.7 F

## 2018-01-05 DIAGNOSIS — J34.89 SINUS PRESSURE: ICD-10-CM

## 2018-01-05 DIAGNOSIS — R05.3 CHRONIC COUGH: Primary | ICD-10-CM

## 2018-01-05 DIAGNOSIS — K13.79 SORE MOUTH: ICD-10-CM

## 2018-01-05 DIAGNOSIS — R05.9 COUGH: ICD-10-CM

## 2018-01-05 PROCEDURE — 99213 OFFICE O/P EST LOW 20 MIN: CPT | Performed by: FAMILY MEDICINE

## 2018-01-05 RX ORDER — TRIAMCINOLONE ACETONIDE 0.1 %
PASTE (GRAM) DENTAL
Qty: 5 G | Refills: 0 | Status: SHIPPED | OUTPATIENT
Start: 2018-01-05 | End: 2018-03-30

## 2018-01-05 RX ORDER — CODEINE PHOSPHATE AND GUAIFENESIN 10; 100 MG/5ML; MG/5ML
1 SOLUTION ORAL
Qty: 120 ML | Refills: 0 | Status: SHIPPED | OUTPATIENT
Start: 2018-01-05 | End: 2018-03-30

## 2018-01-05 NOTE — MR AVS SNAPSHOT
"              After Visit Summary   1/5/2018    Sanjuana Salamanca    MRN: [id number]           Patient Information     Date Of Birth          1955        Visit Information        Provider Department      1/5/2018 2:20 PM Sweetie Gao MD [facility]        Today's Diagnoses     Chronic cough    -  1    Sinus pressure        Cough           Follow-ups after your visit        Your next 10 appointments already scheduled     Jan 25, 2018  2:40 PM CST   [facility]   [facility]    [address] [facility] [address] [phone]              Future tests that were ordered for you today     Open Future Orders        Priority Expected Expires Ordered    CT Maxillofacial w/o Contrast Routine  1/5/2019 1/5/2018    CT Chest w/o Contrast Routine  1/5/2019 1/5/2018            Who to contact     If you have questions or need follow up information about today's clinic visit or your schedule please contact [facility] directly at [phone].  Normal or non-critical lab and imaging results will be communicated to you by [employer], letter or phone within 4 business days after the clinic has received the results. If you do not hear from us within 7 days, please contact the clinic through [employer] or phone. If you have a critical or abnormal lab result, we will notify you by phone as soon as possible.  Submit refill requests through [employer]Line or call your pharmacy and they will forward the refill request to us. Please allow 3 business days for your refill to be completed.          Additional Information About Your Visit        Acacia Livinghart Information     S[employer]Line lets you send messages to your doctor, view your test results, renew your prescriptions, schedule appointments and more. To sign up, go to [email or web address] . Click on \"Log in\" on the left side of the screen, which will take you to the Welcome page. " "Then click on \"Sign up Now\" on the right side of the page.     You will be asked to enter the access code listed below, as well as some personal information. Please follow the directions to create your username and password.     Your access code is: [email or web address]  Expires: 2018  2:29 PM     Your access code will  in 90 days. If you need help or a new code, please call your [place] clinic or [phone].        Care EveryWhere ID     This is your Care EveryWhere ID. This could be used by other organizations to access your [place] medical records  [id number]        Your Vitals Were     Pulse Temperature Height Pulse Oximetry BMI (Body Mass Index)       79 97.7  F (36.5  C) (Oral) 5' 3.5\" (1.613 m) 96% 29.29 kg/m2        Blood Pressure from Last 3 Encounters:   18 147/81   17 142/88   17 (!) 156/91    Weight from Last 3 Encounters:   18 168 lb (76.2 kg)   17 171 lb 8.3 oz (77.8 kg)   17 171 lb 9.6 oz (77.8 kg)                 Today's Medication Changes          These changes are accurate as of: 18  3:01 PM.  If you have any questions, ask your nurse or doctor.               Start taking these medicines.        Dose/Directions    amoxicillin-clavulanate 875-125 MG per tablet   Commonly known as:  AUGMENTIN   Used for:  Sinus pressure, Chronic cough   Started by:  Sweetie Gao MD        Dose:  1 tablet   Take 1 tablet by mouth 2 times daily   Quantity:  20 tablet   Refills:  0            Where to get your medicines      These medications were sent to [facility] [address] [place], [place] - [address] AT [facility] OF  [facility]  [address] [facility] [address]     Phone:  [phone]     amoxicillin-clavulanate 875-125 MG per tablet         Some of these will need a paper prescription and others can be bought over the counter.  Ask your nurse if you have questions.     Bring a paper prescription for each of these medications    "  guaiFENesin-codeine 100-10 MG/5ML Soln solution                Primary Care Provider Office Phone # Fax #    Sweetie Brayden Gao [phone]       [address] [facility] [address]        Equal Access to Services     ROSA SINCLAIR : Hadii sravanthi ku hadasho Soomaali, waaxda luqadaha, qaybta kaalmada adeegyada, waxtyler tai haysammy abelbronwynlalitha feliz. So [facility] [phone].    ATENCIÓN: Si habla español, tiene a ross disposición servicios gratuitos de asistencia lingüística. Llame al [phone].    We comply with applicable federal civil rights laws and [place] laws. We do not discriminate on the basis of race, color, national origin, age, disability, sex, sexual orientation, or gender identity.            Thank you!     Thank you for choosing [facility]  for your care. Our goal is always to provide you with excellent care. Hearing back from our patients is one way we can continue to improve our services. Please take a few minutes to complete the written survey that you may receive in the mail after your visit with us. Thank you!             Your Updated Medication List - Protect others around you: Learn how to safely use, store and throw away your medicines at www.disposemymeds.org.          This list is accurate as of: 1/5/18  3:01 PM.  Always use your most recent med list.                   Brand Name Dispense Instructions for use Diagnosis    acetaminophen 325 MG tablet    TYLENOL    100 tablet    Take 1-2 tablets (325-650 mg) by mouth every 6 hours as needed for mild pain    Primary osteoarthritis of both knees       amoxicillin-clavulanate 875-125 MG per tablet    AUGMENTIN    20 tablet    Take 1 tablet by mouth 2 times daily    Sinus pressure, Chronic cough       cholecalciferol 1000 UNIT tablet    vitamin D3    100 tablet    Take 1 tablet (1,000 Units) by mouth daily    Avitaminosis D       diclofenac 50 MG EC tablet    VOLTAREN    90 tablet    TAKE 1  TABLET(50 MG) BY MOUTH THREE TIMES DAILY AS NEEDED FOR MODERATE PAIN    Osteoarthritis of knee, unspecified laterality, unspecified osteoarthritis type       guaiFENesin-codeine 100-10 MG/5ML Soln solution    ROBITUSSIN AC    120 mL    Take 5 mLs by mouth nightly as needed for cough    Cough       metoprolol 25 MG tablet    LOPRESSOR    180 tablet    Take 1 tablet (25 mg) by mouth 2 times daily    Hypertension goal BP (blood pressure) < 140/90       mometasone-formoterol 100-5 MCG/ACT oral inhaler    DULERA    1 Inhaler    Inhale 2 puffs into the lungs 2 times daily    Chronic cough       montelukast 10 MG tablet    SINGULAIR    30 tablet    Take 1 tablet (10 mg) by mouth At Bedtime    Chronic cough       MULTI COMPLETE Caps     100 capsule    Take 1 capsule by mouth daily    Nutritional deficiency       order for DME     1 kit    Use as directed.    Essential hypertension with goal blood pressure less than 140/90       polyethylene glycol powder    MIRALAX    510 g    Take 17 g (1 capful) by mouth daily    Constipation, unspecified constipation type       ranitidine 150 MG tablet    ZANTAC    180 tablet    Take 1 tablet (150 mg) by mouth 2 times daily    Gastroesophageal reflux disease, esophagitis presence not specified       selenium sulfide 2.5 % lotion    SELSUN    118 mL    Apply to affected area and lather with small amounts of water; leave on skin for 10 minutes, then rinse thoroughly; repeat once every day for 7 days for 8 weeks.    Tinea versicolor

## 2018-01-05 NOTE — NURSING NOTE
"Chief Complaint   Patient presents with     Cough       Initial /81  Pulse 79  Temp 97.7  F (36.5  C) (Oral)  Ht 5' 3.5\" (1.613 m)  Wt 168 lb (76.2 kg)  SpO2 96%  BMI 29.29 kg/m2 Estimated body mass index is 29.29 kg/(m^2) as calculated from the following:    Height as of this encounter: 5' 3.5\" (1.613 m).    Weight as of this encounter: 168 lb (76.2 kg).  Medication Reconciliation: complete  Fede Castellanos MA    "

## 2018-01-05 NOTE — PROGRESS NOTES
"  SUBJECTIVE:   Sanjuana Salamanca is a 63 year old female who presents to clinic today for the following health issues:    ENT Symptoms :            Symptoms: cc Present Absent Comment   Fever/Chills   x    Fatigue   x    Muscle Aches   x    Eye Irritation   x    Sneezing   x    Nasal Nicholas/Drg   x    Sinus Pressure/Pain   x    Loss of smell   x    Dental pain   x    Sore Throat   x    Swollen Glands   x    Ear Pain/Fullness   x    Cough  x     Wheeze   x    Chest Pain   x    Shortness of breath   x    Rash   x    Other   x      Symptom duration:  long time   Symptom severity:  moderate    Treatments tried:  inhalers,cough syrup    Contacts:  none      Seen by Allergy specialist and ENT for chronic cough. Pt has been using the meds changed per Allergy specialist w/o much relief.   \" I had cough when I was in [place] and I took antibiotics for 30 days\" \" I don't want CT, it hurts\" she had ENT check done and she gagged with the exam, hence she is scared of doing any other tests.     No new symptoms.     Problem list and histories reviewed & adjusted, as indicated.  Additional history: as documented    Patient Active Problem List   Diagnosis     Avitaminosis D     Esophageal reflux     OA (osteoarthritis) of knee     Advanced directives, counseling/discussion     Hyperlipidemia LDL goal <160     Cataracts, both eyes     Dry eyes     Hypokalemia     Prediabetes     Essential hypertension with goal blood pressure less than 140/90     History reviewed. No pertinent surgical history.    Social History   Substance Use Topics     Smoking status: Never Smoker     Smokeless tobacco: Never Used     Alcohol use No     Family History   Problem Relation Age of Onset     Hypertension Mother      Thyroid Disease Mother      DIABETES Other      CEREBROVASCULAR DISEASE Other      Glaucoma No family hx of      Macular Degeneration No family hx of      CANCER No family hx of          Current Outpatient Prescriptions   Medication Sig Dispense " Refill     amoxicillin-clavulanate (AUGMENTIN) 875-125 MG per tablet Take 1 tablet by mouth 2 times daily 20 tablet 0     guaiFENesin-codeine (ROBITUSSIN AC) 100-10 MG/5ML SOLN solution Take 5 mLs by mouth nightly as needed for cough 120 mL 0     triamcinolone (KENALOG) 0.1 % paste Apply on mouth sore twice daily x 14 days. 5 g 0     mometasone-formoterol (DULERA) 100-5 MCG/ACT oral inhaler Inhale 2 puffs into the lungs 2 times daily 1 Inhaler 1     montelukast (SINGULAIR) 10 MG tablet Take 1 tablet (10 mg) by mouth At Bedtime 30 tablet 1     ranitidine (ZANTAC) 150 MG tablet Take 1 tablet (150 mg) by mouth 2 times daily 180 tablet 1     acetaminophen (TYLENOL) 325 MG tablet Take 1-2 tablets (325-650 mg) by mouth every 6 hours as needed for mild pain 100 tablet 3     diclofenac (VOLTAREN) 50 MG EC tablet TAKE 1 TABLET(50 MG) BY MOUTH THREE TIMES DAILY AS NEEDED FOR MODERATE PAIN 90 tablet 0     Multiple Vitamins-Minerals (MULTI COMPLETE) CAPS Take 1 capsule by mouth daily 100 capsule 3     metoprolol (LOPRESSOR) 25 MG tablet Take 1 tablet (25 mg) by mouth 2 times daily 180 tablet 0     selenium sulfide (SELSUN) 2.5 % lotion Apply to affected area and lather with small amounts of water; leave on skin for 10 minutes, then rinse thoroughly; repeat once every day for 7 days for 8 weeks. 118 mL 1     cholecalciferol (VITAMIN D3) 1000 UNIT tablet Take 1 tablet (1,000 Units) by mouth daily 100 tablet 3     order for DME Use as directed. 1 kit 0     polyethylene glycol (MIRALAX) powder Take 17 g (1 capful) by mouth daily 510 g 1     Allergies   Allergen Reactions     Valsartan Cough     Recent Labs   Lab Test  08/14/17   1620  02/06/17   1524   01/15/16   1220   07/29/15   1231  09/19/14   1533  04/14/14   1555   A1C   --   5.7   --   6.0   --   6.2*   --    --    LDL   --    --    --    --    --   135*  138*  165*   HDL   --    --    --    --    --   32*  42*  34*   TRIG   --    --    --    --    --   220*  181*  137   ALT  "  --   30   --   32   --   28   --    --    CR   --   0.66   --   0.57   < >  0.48*  0.61   --    GFRESTIMATED   --   >90  Non  GFR Calc     --   >90  Non  GFR Calc     < >  >90  Non  GFR Calc    >90  Non  GFR Calc     --    GFRESTBLACK   --   >90   GFR Calc     --   >90   GFR Calc     < >  >90   GFR Calc    >90   GFR Calc     --    POTASSIUM  3.9  3.5   < >  3.7   < >  3.1*  3.7  3.6   TSH   --   1.35   --    --    --   1.72   --    --     < > = values in this interval not displayed.      BP Readings from Last 3 Encounters:   01/05/18 147/81   12/21/17 142/88   12/21/17 (!) 156/91    Wt Readings from Last 3 Encounters:   01/05/18 168 lb (76.2 kg)   12/21/17 171 lb 8.3 oz (77.8 kg)   12/21/17 171 lb 9.6 oz (77.8 kg)                  Labs reviewed in EPIC          Reviewed and updated as needed this visit by clinical staffTobacco  Allergies  Meds  Med Hx  Surg Hx  Fam Hx  Soc Hx      Reviewed and updated as needed this visit by Provider         ROS:  Constitutional, HEENT, cardiovascular, pulmonary, gi and gu systems are negative, except as otherwise noted.      OBJECTIVE:   /81  Pulse 79  Temp 97.7  F (36.5  C) (Oral)  Ht 5' 3.5\" (1.613 m)  Wt 168 lb (76.2 kg)  SpO2 96%  BMI 29.29 kg/m2  Body mass index is 29.29 kg/(m^2).  GENERAL: healthy, alert and no distress  HENT: ear canals and TM's normal, nose and mouth without ulcers or lesions  SINUSES: tenderness over frontal sinuses.   NECK: no adenopathy, no asymmetry, masses, or scars and thyroid normal to palpation  RESP: lungs clear to auscultation - no rales, rhonchi or wheezes  CV: regular rate and rhythm    ASSESSMENT/PLAN:       ICD-10-CM    1. Chronic cough R05 CT Chest w/o Contrast     CT Maxillofacial w/o Contrast     amoxicillin-clavulanate (AUGMENTIN) 875-125 MG per tablet   2. Sinus pressure J34.89 " amoxicillin-clavulanate (AUGMENTIN) 875-125 MG per tablet   3. Cough R05 guaiFENesin-codeine (ROBITUSSIN AC) 100-10 MG/5ML SOLN solution   4. Sore mouth K13.79 triamcinolone (KENALOG) 0.1 % paste     For sinus pressure will treat with Augmentin. Explained CT scan is noninvasive test. Pt to get CT sinuses and chest done for further evaluation of her chronic cough.       Sweetie Gao MD  [facility]

## 2018-01-05 NOTE — TELEPHONE ENCOUNTER
Please call patient to discuss lab results. Allergy testing was positive only for cat - no other seasonal or environmental allergies. Based on these results it is unlikely that allergies are the cause for her cough. She should continue with her currently prescribed medications. If cough is not improving would consider trial of PPI for possible reflux as well as further imaging with chest and/or sinus CT.

## 2018-01-05 NOTE — TELEPHONE ENCOUNTER
RN left message for patient to return call to RN's direct line @ [phone].    Briana Leblanc RN

## 2018-01-05 NOTE — LETTER
1/9/2018     Sanjuana Salamanca  [address]    [place] [address]      Dear Sanjuana:    Your allergy testing was positive only for cat - no other seasonal or environmental allergies.  Based on these results, it is unlikely that allergies are the cause for your cough.    You should continue with your currently prescribed medications.  If your cough is not improving, we could consider a trial of a medication for possible acid reflux, as well as further imaging with chest and/or sinus CT.  Please let us know if you have additional questions or concerns.      Sincerely,    [facility]  [address]  [place] [address]  Phone: [phone]

## 2018-01-08 NOTE — TELEPHONE ENCOUNTER
RN left second message for patient to return call to RN's direct line @ [phone].    Briana Leblanc RN

## 2018-01-09 NOTE — TELEPHONE ENCOUNTER
RN has attempted to contact patient and has left two messages.  Patient has not returned call.  Letter sent with result note from provider.   Closing encounter.    Briana Leblanc RN

## 2018-01-10 ENCOUNTER — TELEPHONE (OUTPATIENT)
Dept: FAMILY MEDICINE | Facility: CLINIC | Age: 63
End: 2018-01-10

## 2018-01-24 ENCOUNTER — TELEPHONE (OUTPATIENT)
Dept: FAMILY MEDICINE | Facility: CLINIC | Age: 63
End: 2018-01-24

## 2018-01-24 NOTE — LETTER
January 24, 2018    Sanjuana Salamanca  [address]    [place] [address]    Dear Sanjuana    We care about your health and have reviewed your health plan. We have reviewed your medical conditions, medication list, and lab results and are making recommendations based on this review, to better manage your health.    You are in particular need of attention regarding:  - Scheduling a Breast Cancer Screening (Mammography) [phone]  - Completing a Colon Cancer Screening (FIT) - Please complete FIT test *** and mail completed test to clinic.  - Scheduling a Physical with a Cervical Cancer Screening (Pap Smear) age 64 and younger [phone]      Here is a list of Health Maintenance topics that are due now or due soon:  Health Maintenance Due   Topic Date Due     PAP SCREENING Q3 YR (SYSTEM ASSIGNED)  05/15/2017     INFLUENZA VACCINE (SYSTEM ASSIGNED)  09/01/2017     BMP Q1 YR  02/06/2018     FIT Q1 YR  02/10/2018     We will be calling you in the next couple of weeks to help you schedule any appointments that are needed.  Please call us at [phone] (or use [employer]) to address the above recommendations.     Thank you for trusting [facility] and we appreciate the opportunity to serve you.  We look forward to supporting your healthcare needs in the future.    Healthy Regards,    Dr. Gao

## 2018-01-24 NOTE — TELEPHONE ENCOUNTER
Panel Management Review      Patient has the following on her problem list:       IVD   ASA: FAILED    Last LDL:    Lab Results   Component Value Date    CHOL 211 07/29/2015     Lab Results   Component Value Date    HDL 32 07/29/2015     Lab Results   Component Value Date     07/29/2015     Lab Results   Component Value Date    TRIG 220 07/29/2015        Lab Results   Component Value Date    CHOLHDLRATIO 6.6 07/29/2015        Is the patient on a Statin? NO   Is the patient on Aspirin? NO                    Last three blood pressure readings:  BP Readings from Last 3 Encounters:   01/05/18 147/81   12/21/17 142/88   12/21/17 (!) 156/91        Tobacco History:     History   Smoking Status     Never Smoker   Smokeless Tobacco     Never Used       Hypertension   Last three blood pressure readings:  BP Readings from Last 3 Encounters:   01/05/18 147/81   12/21/17 142/88   12/21/17 (!) 156/91     Blood pressure: FAILED    HTN Guidelines:  Age 18-59 BP range:  Less than 140/90  Age 60-85 with Diabetes:  Less than 140/90  Age 60-85 without Diabetes:  less than 150/90      Composite cancer screening  Chart review shows that this patient is due/due soon for the following Pap Smear, Mammogram and Fecal Colorectal (FIT)  Summary:    Patient is due/failing the following:   FIT, MAMMOGRAM, PAP and PHYSICAL    Action needed:   Patient needs office visit for Physical .    Type of outreach:    Sent letter.    Questions for provider review:    None                                                                                                                                    Fede Castellanos MA       Chart routed to Care Team .

## 2018-01-25 ENCOUNTER — OFFICE VISIT (OUTPATIENT)
Dept: ALLERGY | Facility: CLINIC | Age: 63
End: 2018-01-25
Payer: COMMERCIAL

## 2018-01-25 VITALS
TEMPERATURE: 98.6 F | OXYGEN SATURATION: 97 % | SYSTOLIC BLOOD PRESSURE: 142 MMHG | BODY MASS INDEX: 30.3 KG/M2 | HEART RATE: 95 BPM | HEIGHT: 63 IN | DIASTOLIC BLOOD PRESSURE: 90 MMHG | RESPIRATION RATE: 20 BRPM | WEIGHT: 171 LBS

## 2018-01-25 DIAGNOSIS — R05.3 CHRONIC COUGH: Primary | ICD-10-CM

## 2018-01-25 PROCEDURE — 99213 OFFICE O/P EST LOW 20 MIN: CPT | Performed by: ALLERGY & IMMUNOLOGY

## 2018-01-25 RX ORDER — CHLORHEXIDINE GLUCONATE ORAL RINSE 1.2 MG/ML
SOLUTION DENTAL
COMMUNITY
Start: 2018-01-08 | End: 2018-03-30

## 2018-01-25 RX ORDER — FLUTICASONE PROPIONATE 220 UG/1
2 AEROSOL, METERED RESPIRATORY (INHALATION) 2 TIMES DAILY
Qty: 1 INHALER | Refills: 3 | Status: SHIPPED | OUTPATIENT
Start: 2018-01-25 | End: 2018-03-30

## 2018-01-25 RX ORDER — CETIRIZINE HYDROCHLORIDE 10 MG/1
TABLET ORAL
COMMUNITY
Start: 2017-12-21 | End: 2018-03-30

## 2018-01-25 RX ORDER — ALBUTEROL SULFATE 90 UG/1
AEROSOL, METERED RESPIRATORY (INHALATION)
COMMUNITY
Start: 2017-10-16 | End: 2018-03-30

## 2018-01-25 NOTE — PATIENT INSTRUCTIONS
If you have any questions regarding your allergies, asthma, or what we discussed during your visit today please call the allergy clinic or contact us via [employer].    [facility]: [phone]      Call the [facility] in [place] to set up an appointment for the CT scan. Ask them to schedule an appointment for both CT scans - one for your lungs and one for your sinuses. The phone number for you to call is [phone].      Take the following medications for your cough:    1. Flovent inhaler (orange) - 2 puffs in the morning and 2 puffs at night  2. Albuterol inhaler (blue) - Take 2 to 4 puffs every 4 hours as needed for cough, shortness of breath, chest tightness, or wheezing    If you are still coughing we can talk about doing other special breathing tests to check for problems with your lungs. Or send you to a throat/lung specialist.

## 2018-01-25 NOTE — Clinical Note
1/25/2018         RE: Sanjuana Salamanca  [address]    [place] [address]        Dear Colleague,    Thank you for referring your patient, Sanjuana Salamanca, to the [facility]. Please see a copy of my visit note below.    Sanjuana Salamanca was seen in the Allergy Clinic at [facility]. The following are my recommendations regarding her {Allergydiagnoses:838746}      Sanjuana Salamanca is a 63 year old [place] female who is seen today for follow-up of     States she was given 10 days of antibiotics and her cough improved. 2 days ago the cough returned. Cough is dry. Denies sore throat. Cough does not wake her up at night. Stopped dulera - states it was making her cough. Continues to use albuterol as needed for the cough - about 3 times per day for the cough. Was tolerating qvar previously without difficulty.    REVIEW OF SYSTEMS:  General: negative for weight gain. negative for weight loss. negative for changes in sleep.   Eyes: negative for itching. negative for redness. negative for tearing/watering.  Ears: negative for fullness. negative for hearing loss. negative for dizziness.   Nose: positive  for snoring.negative for changes in smell. negative for drainage.   Throat: positive  for hoarseness. negative for sore throat. negative for trouble swallowing.   Lungs: negative for shortness of breath.negative for wheezing. negative for sputum production.   Cardiovascular: negative for chest pain. negative for swelling of ankles. negative for fast or irregular heartbeat.   Gastrointestinal: negative for nausea. negative for heartburn. negative for acid reflux.   Musculoskeletal: negative for joint pain. negative for joint stiffness. negative for joint swelling.   Neurologic: negative for seizures. negative for fainting. negative for weakness.   Psychiatric: negative for changes in mood. negative for anxiety.   Endocrine: negative for cold intolerance. negative for heat intolerance. negative  for tremors.   Hematologic: negative for easy bruising. negative for easy bleeding.  Integumentary: negative for rash. negative for scaling. negative for nail changes.       Current Outpatient Prescriptions:      MAPAP 500 MG tablet, , Disp: , Rfl:      VENTOLIN  (90 BASE) MCG/ACT Inhaler, , Disp: , Rfl:      cetirizine (ZYRTEC) 10 MG tablet, , Disp: , Rfl:      chlorhexidine (PERIDEX) 0.12 % solution, , Disp: , Rfl:      montelukast (SINGULAIR) 10 MG tablet, Take 1 tablet (10 mg) by mouth At Bedtime, Disp: 30 tablet, Rfl: 1     ranitidine (ZANTAC) 150 MG tablet, Take 1 tablet (150 mg) by mouth 2 times daily, Disp: 180 tablet, Rfl: 1     acetaminophen (TYLENOL) 325 MG tablet, Take 1-2 tablets (325-650 mg) by mouth every 6 hours as needed for mild pain, Disp: 100 tablet, Rfl: 3     diclofenac (VOLTAREN) 50 MG EC tablet, TAKE 1 TABLET(50 MG) BY MOUTH THREE TIMES DAILY AS NEEDED FOR MODERATE PAIN, Disp: 90 tablet, Rfl: 0     Multiple Vitamins-Minerals (MULTI COMPLETE) CAPS, Take 1 capsule by mouth daily, Disp: 100 capsule, Rfl: 3     metoprolol (LOPRESSOR) 25 MG tablet, Take 1 tablet (25 mg) by mouth 2 times daily, Disp: 180 tablet, Rfl: 0     selenium sulfide (SELSUN) 2.5 % lotion, Apply to affected area and lather with small amounts of water; leave on skin for 10 minutes, then rinse thoroughly; repeat once every day for 7 days for 8 weeks., Disp: 118 mL, Rfl: 1     cholecalciferol (VITAMIN D3) 1000 UNIT tablet, Take 1 tablet (1,000 Units) by mouth daily, Disp: 100 tablet, Rfl: 3     polyethylene glycol (MIRALAX) powder, Take 17 g (1 capful) by mouth daily, Disp: 510 g, Rfl: 1     guaiFENesin-codeine (ROBITUSSIN AC) 100-10 MG/5ML SOLN solution, Take 5 mLs by mouth nightly as needed for cough (Patient not taking: Reported on 1/25/2018), Disp: 120 mL, Rfl: 0     triamcinolone (KENALOG) 0.1 % paste, Apply on mouth sore twice daily x 14 days. (Patient not taking: Reported on 1/25/2018), Disp: 5 g, Rfl: 0      "mometasone-formoterol (DULERA) 100-5 MCG/ACT oral inhaler, Inhale 2 puffs into the lungs 2 times daily (Patient not taking: Reported on 1/25/2018), Disp: 1 Inhaler, Rfl: 1     order for DME, Use as directed. (Patient not taking: Reported on 1/25/2018), Disp: 1 kit, Rfl: 0    EXAM:   /90 (BP Location: Right arm, Patient Position: Chair, Cuff Size: Adult Regular)  Pulse 95  Temp 98.6  F (37  C) (Oral)  Resp 20  Ht 1.6 m (5' 2.99\")  Wt 77.6 kg (171 lb)  SpO2 97%  BMI 30.3 kg/m2  GENERAL APPEARANCE: { :698095}  SKIN: {SKIN:885483}  HEAD: {EXAM NCC CONST HEAD:019163}  ENT: { :465306}  NECK: { :317918}  LUNGS: { :517444}  HEART: { :465845}  MUSCULOSKELETAL: { :298939}  NEURO: { :552697}  PSYCH: { :253039}      WORKUP:  {ALLERGYWORKUP:490526}    ASSESSMENT/PLAN:  Sanjuana Salamanca is a 63 year old female    ***    Juani Guerrero MD  Allergy/Immunology  [facility] and [place], [place]      Chart documentation done in part with Dragon Voice Recognition Software. Although reviewed after completion, some word and grammatical errors may remain.    Again, thank you for allowing me to participate in the care of your patient.        Sincerely,        Juani Guerrero MD  "

## 2018-01-25 NOTE — MR AVS SNAPSHOT
After Visit Summary   1/25/2018    Sanjuana Salamanca    MRN: [id number]           Patient Information     Date Of Birth          1955        Visit Information        Provider Department      1/25/2018 2:40 PM Juani Guerrero MD [facility]        Today's Diagnoses     Chronic cough    -  1      Care Instructions    If you have any questions regarding your allergies, asthma, or what we discussed during your visit today please call the allergy clinic or contact us via CrossMedia.    [facility] Allergy: [phone]      Call the [facility] ([facility] in [place] to set up an appointment for the CT scan. Ask them to schedule an appointment for both CT scans - one for your lungs and one for your sinuses. The phone number for you to call is [phone].      Take the following medications for your cough:    1. Flovent inhaler (orange) - 2 puffs in the morning and 2 puffs at night  2. Albuterol inhaler (blue) - Take 2 to 4 puffs every 4 hours as needed for cough, shortness of breath, chest tightness, or wheezing    If you are still coughing we can talk about doing other special breathing tests to check for problems with your lungs. Or send you to a throat/lung specialist.          Follow-ups after your visit        Who to contact     If you have questions or need follow up information about today's clinic visit or your schedule please contact [facility] directly at [phone].  Normal or non-critical lab and imaging results will be communicated to you by Bandwagonhart, letter or phone within 4 business days after the clinic has received the results. If you do not hear from us within 7 days, please contact the clinic through Bandwagonhart or phone. If you have a critical or abnormal lab result, we will notify you by phone as soon as possible.  Submit refill requests through CrossMedia or call your pharmacy and they will forward the refill request to us. Please allow 3 business days for your refill to  "be completed.          Additional Information About Your Visit        CosentialharGet Together Information     ReadyCart lets you send messages to your doctor, view your test results, renew your prescriptions, schedule appointments and more. To sign up, go to [email or web address] . Click on \"Log in\" on the left side of the screen, which will take you to the Welcome page. Then click on \"Sign up Now\" on the right side of the page.     You will be asked to enter the access code listed below, as well as some personal information. Please follow the directions to create your username and password.     Your access code is: [email or web address]  Expires: 2018  2:29 PM     Your access code will  in 90 days. If you need help or a new code, please call your [facility] clinic or [phone].        Care EveryWhere ID     This is your Care EveryWhere ID. This could be used by other organizations to access your [facility] medical records  [id number]        Your Vitals Were     Pulse Temperature Respirations Height Pulse Oximetry BMI (Body Mass Index)    95 98.6  F (37  C) (Oral) 20 1.6 m (5' 2.99\") 97% 30.3 kg/m2       Blood Pressure from Last 3 Encounters:   18 142/90   18 147/81   17 142/88    Weight from Last 3 Encounters:   18 77.6 kg (171 lb)   18 76.2 kg (168 lb)   17 77.8 kg (171 lb 8.3 oz)              Today, you had the following     No orders found for display         Today's Medication Changes          These changes are accurate as of 18  3:26 PM.  If you have any questions, ask your nurse or doctor.               Start taking these medicines.        Dose/Directions    fluticasone 220 MCG/ACT Inhaler   Commonly known as:  FLOVENT HFA   Used for:  Chronic cough   Started by:  Juani Guerrero MD        Dose:  2 puff   Inhale 2 puffs into the lungs 2 times daily   Quantity:  1 Inhaler   Refills:  3            Where to get your medicines      These medications were sent to [facility] " [id number] - [place], [place] - [address] AT [facility] [address]  [address], [facility] [address]     Phone:  [phone]     fluticasone 220 MCG/ACT Inhaler                Primary Care Provider Office Phone # Fax #    Sweetie Brayden Goa [phone] [address]  [facility] [address]        Equal Access to Services     ROSA SINCLAIR : Hadii aad ku hadasho Soomaali, waaxda luqadaha, qaybta kaalmada adeegyada, waxay idiin hayaan adeeg coltenaralalitha la'sammy . So [facility] [phone].    ATENCIÓN: Si habla español, tiene a ross disposición servicios gratuitos de asistencia lingüística. [facility] [phone].    We comply with applicable federal civil rights laws and [place] laws. We do not discriminate on the basis of race, color, national origin, age, disability, sex, sexual orientation, or gender identity.            Thank you!     Thank you for choosing [facility]  for your care. Our goal is always to provide you with excellent care. Hearing back from our patients is one way we can continue to improve our services. Please take a few minutes to complete the written survey that you may receive in the mail after your visit with us. Thank you!             Your Updated Medication List - Protect others around you: Learn how to safely use, store and throw away your medicines at www.disposemymeds.org.          This list is accurate as of 1/25/18  3:26 PM.  Always use your most recent med list.                   Brand Name Dispense Instructions for use Diagnosis    * MAPAP 500 MG tablet   Generic drug:  acetaminophen           * acetaminophen 325 MG tablet    TYLENOL    100 tablet    Take 1-2 tablets (325-650 mg) by mouth every 6 hours as needed for mild pain    Primary osteoarthritis of both knees       cetirizine 10 MG tablet    zyrTEC          chlorhexidine 0.12 % solution    PERIDEX          cholecalciferol 1000 UNIT tablet    vitamin D3    100 tablet     Take 1 tablet (1,000 Units) by mouth daily    Avitaminosis D       diclofenac 50 MG EC tablet    VOLTAREN    90 tablet    TAKE 1 TABLET(50 MG) BY MOUTH THREE TIMES DAILY AS NEEDED FOR MODERATE PAIN    Osteoarthritis of knee, unspecified laterality, unspecified osteoarthritis type       fluticasone 220 MCG/ACT Inhaler    FLOVENT HFA    1 Inhaler    Inhale 2 puffs into the lungs 2 times daily    Chronic cough       guaiFENesin-codeine 100-10 MG/5ML Soln solution    ROBITUSSIN AC    120 mL    Take 5 mLs by mouth nightly as needed for cough    Cough       metoprolol tartrate 25 MG tablet    LOPRESSOR    180 tablet    Take 1 tablet (25 mg) by mouth 2 times daily    Hypertension goal BP (blood pressure) < 140/90       mometasone-formoterol 100-5 MCG/ACT oral inhaler    DULERA    1 Inhaler    Inhale 2 puffs into the lungs 2 times daily    Chronic cough       montelukast 10 MG tablet    SINGULAIR    30 tablet    Take 1 tablet (10 mg) by mouth At Bedtime    Chronic cough       MULTI COMPLETE Caps     100 capsule    Take 1 capsule by mouth daily    Nutritional deficiency       order for DME     1 kit    Use as directed.    Essential hypertension with goal blood pressure less than 140/90       polyethylene glycol powder    MIRALAX    510 g    Take 17 g (1 capful) by mouth daily    Constipation, unspecified constipation type       ranitidine 150 MG tablet    ZANTAC    180 tablet    Take 1 tablet (150 mg) by mouth 2 times daily    Gastroesophageal reflux disease, esophagitis presence not specified       selenium sulfide 2.5 % lotion    SELSUN    118 mL    Apply to affected area and lather with small amounts of water; leave on skin for 10 minutes, then rinse thoroughly; repeat once every day for 7 days for 8 weeks.    Tinea versicolor       triamcinolone 0.1 % paste    KENALOG    5 g    Apply on mouth sore twice daily x 14 days.    Sore mouth       VENTOLIN  (90 BASE) MCG/ACT Inhaler   Generic drug:  albuterol           *  Notice:  This list has 2 medication(s) that are the same as other medications prescribed for you. Read the directions carefully, and ask your doctor or other care provider to review them with you.

## 2018-01-25 NOTE — PROGRESS NOTES
Sanjuana Salamanca was seen in the Allergy Clinic at [facility]. The following are my recommendations regarding her Chronic Cough    1. Resume flovent 220mcg, 2 puffs twice daily  2. Continue albuterol HFA, 2-4 puffs every 4 hours as needed  3. Consider trial of PPI - patient declined today  4. Recommend methacholine challenge and/or evaluation with laryngology/speech therapy - patient declined today  5. Follow-up in 3 months - sooner if needed      Sanjuana Salamanca is a 63 year old [place] female who is seen today for follow-up of chronic cough. She states that she was prescribed 10 days of antibiotics earlier this month and her cough improved. 2 days ago the dry cough returned. Sanjuana denies sore throat, chest tightness, shortness of breath, or wheezing. The cough does not wake her up at night. She continues to use albuterol as needed - about 3 times per day. Sanjuana self-discontinued the dulera as she felt it was the cause of her cough. She was previously tolerating the Qvar and felt it may have been helping her. She would like to go back to this medication.    Sanjuana was recently seen by her PCP and further evaluation with chest and sinus CT was recommended. Sanjuana states she does not want to schedule the CT scans as she is very anxious about being in the machine. She requests longer course of antibiotics as she feels this has been effective for her cough in the past.      REVIEW OF SYSTEMS:  General: negative for weight gain. negative for weight loss. negative for changes in sleep.   Eyes: negative for itching. negative for redness. negative for tearing/watering.  Ears: negative for fullness. negative for hearing loss. negative for dizziness.   Nose: positive  for snoring.negative for changes in smell. negative for drainage.   Throat: positive  for hoarseness. negative for sore throat. negative for trouble swallowing.   Lungs: negative for shortness of breath.negative for wheezing. negative for sputum  production.   Cardiovascular: negative for chest pain. negative for swelling of ankles. negative for fast or irregular heartbeat.   Gastrointestinal: negative for nausea. negative for heartburn. negative for acid reflux.   Musculoskeletal: negative for joint pain. negative for joint stiffness. negative for joint swelling.   Neurologic: negative for seizures. negative for fainting. negative for weakness.   Psychiatric: negative for changes in mood. negative for anxiety.   Endocrine: negative for cold intolerance. negative for heat intolerance. negative for tremors.   Hematologic: negative for easy bruising. negative for easy bleeding.  Integumentary: negative for rash. negative for scaling. negative for nail changes.       Current Outpatient Prescriptions:      MAPAP 500 MG tablet, , Disp: , Rfl:      VENTOLIN  (90 BASE) MCG/ACT Inhaler, , Disp: , Rfl:      cetirizine (ZYRTEC) 10 MG tablet, , Disp: , Rfl:      chlorhexidine (PERIDEX) 0.12 % solution, , Disp: , Rfl:      montelukast (SINGULAIR) 10 MG tablet, Take 1 tablet (10 mg) by mouth At Bedtime, Disp: 30 tablet, Rfl: 1     ranitidine (ZANTAC) 150 MG tablet, Take 1 tablet (150 mg) by mouth 2 times daily, Disp: 180 tablet, Rfl: 1     acetaminophen (TYLENOL) 325 MG tablet, Take 1-2 tablets (325-650 mg) by mouth every 6 hours as needed for mild pain, Disp: 100 tablet, Rfl: 3     diclofenac (VOLTAREN) 50 MG EC tablet, TAKE 1 TABLET(50 MG) BY MOUTH THREE TIMES DAILY AS NEEDED FOR MODERATE PAIN, Disp: 90 tablet, Rfl: 0     Multiple Vitamins-Minerals (MULTI COMPLETE) CAPS, Take 1 capsule by mouth daily, Disp: 100 capsule, Rfl: 3     metoprolol (LOPRESSOR) 25 MG tablet, Take 1 tablet (25 mg) by mouth 2 times daily, Disp: 180 tablet, Rfl: 0     selenium sulfide (SELSUN) 2.5 % lotion, Apply to affected area and lather with small amounts of water; leave on skin for 10 minutes, then rinse thoroughly; repeat once every day for 7 days for 8 weeks., Disp: 118 mL, Rfl:  "1     cholecalciferol (VITAMIN D3) 1000 UNIT tablet, Take 1 tablet (1,000 Units) by mouth daily, Disp: 100 tablet, Rfl: 3     polyethylene glycol (MIRALAX) powder, Take 17 g (1 capful) by mouth daily, Disp: 510 g, Rfl: 1     guaiFENesin-codeine (ROBITUSSIN AC) 100-10 MG/5ML SOLN solution, Take 5 mLs by mouth nightly as needed for cough (Patient not taking: Reported on 1/25/2018), Disp: 120 mL, Rfl: 0     triamcinolone (KENALOG) 0.1 % paste, Apply on mouth sore twice daily x 14 days. (Patient not taking: Reported on 1/25/2018), Disp: 5 g, Rfl: 0     mometasone-formoterol (DULERA) 100-5 MCG/ACT oral inhaler, Inhale 2 puffs into the lungs 2 times daily (Patient not taking: Reported on 1/25/2018), Disp: 1 Inhaler, Rfl: 1     order for DME, Use as directed. (Patient not taking: Reported on 1/25/2018), Disp: 1 kit, Rfl: 0    EXAM:   /90 (BP Location: Right arm, Patient Position: Chair, Cuff Size: Adult Regular)  Pulse 95  Temp 98.6  F (37  C) (Oral)  Resp 20  Ht 1.6 m (5' 2.99\")  Wt 77.6 kg (171 lb)  SpO2 97%  BMI 30.3 kg/m2  GENERAL APPEARANCE: alert, cooperative and not in distress  SKIN: no rashes, no lesions  HEAD: atraumatic, normocephalic  ENT: no scars or lesions, nasal exam showed no discharge, swelling or lesions noted, tongue midline and normal, soft palate, uvula, and tonsils normal  NECK: no asymmetry, masses, or scars, supple without significant adenopathy  LUNGS: unlabored respirations, no intercostal retractions or accessory muscle use, clear to auscultation without rales or wheezes  HEART: regular rate and rhythm without murmurs and normal S1 and S2  MUSCULOSKELETAL: no musculoskeletal defects are noted  NEURO: no focal deficits noted  PSYCH: does not appear depressed or anxious      WORKUP:  None    ASSESSMENT/PLAN:  Sanjuana Salamanca is a 63 year old female here for follow-up of chronic cough. She was counseled regarding potential causes of chronic cough including allergies, chronic sinus " "disease, asthma, and GERD. We also discussed the possibility of post-infectious cough as she has a pattern of persistent coughing after respiratory illness. She does not have evidence of a bacterial infection and antibiotics are not warranted at this time. Sanjuana is hesitant to schedule CT scans as she is very anxious about \"being in a tube\" and wants to think more about proceeding with this. We discussed additional evaluation with methacholine challenge and/or evaluation with speech therapy however she declined and requested to continue with medications.    1. Resume flovent 220mcg, 2 puffs twice daily  2. Continue albuterol HFA, 2-4 puffs every 4 hours as needed  3. Consider trial of PPI - patient declined today  4. Recommend methacholine challenge and/or evaluation with laryngology/speech therapy - patient declined today  5. Follow-up in 3 months - sooner if needed      Juani Guerrero MD  Allergy/Immunology  [place], [place]      Chart documentation done in part with Dragon Voice Recognition Software. Although reviewed after completion, some word and grammatical errors may remain.  "

## 2018-03-30 ENCOUNTER — OFFICE VISIT (OUTPATIENT)
Dept: FAMILY MEDICINE | Facility: CLINIC | Age: 63
End: 2018-03-30
Payer: COMMERCIAL

## 2018-03-30 VITALS
BODY MASS INDEX: 30.48 KG/M2 | TEMPERATURE: 97.8 F | WEIGHT: 172 LBS | HEART RATE: 83 BPM | HEIGHT: 63 IN | SYSTOLIC BLOOD PRESSURE: 138 MMHG | DIASTOLIC BLOOD PRESSURE: 84 MMHG

## 2018-03-30 DIAGNOSIS — R73.03 PREDIABETES: ICD-10-CM

## 2018-03-30 DIAGNOSIS — I10 ESSENTIAL HYPERTENSION WITH GOAL BLOOD PRESSURE LESS THAN 140/90: Primary | ICD-10-CM

## 2018-03-30 DIAGNOSIS — R63.1 POLYDIPSIA: ICD-10-CM

## 2018-03-30 DIAGNOSIS — E87.6 HYPOKALEMIA: ICD-10-CM

## 2018-03-30 LAB
ALBUMIN SERPL-MCNC: 3.7 G/DL (ref 3.4–5)
ALP SERPL-CCNC: 76 U/L (ref 40–150)
ALT SERPL W P-5'-P-CCNC: 29 U/L (ref 0–50)
ANION GAP SERPL CALCULATED.3IONS-SCNC: 10 MMOL/L (ref 3–14)
AST SERPL W P-5'-P-CCNC: 26 U/L (ref 0–45)
BILIRUB SERPL-MCNC: 0.3 MG/DL (ref 0.2–1.3)
BUN SERPL-MCNC: 12 MG/DL (ref 7–30)
CALCIUM SERPL-MCNC: 9.5 MG/DL (ref 8.5–10.1)
CHLORIDE SERPL-SCNC: 104 MMOL/L (ref 94–109)
CO2 SERPL-SCNC: 27 MMOL/L (ref 20–32)
CREAT SERPL-MCNC: 0.54 MG/DL (ref 0.52–1.04)
GFR SERPL CREATININE-BSD FRML MDRD: >90 ML/MIN/1.7M2
GLUCOSE SERPL-MCNC: 113 MG/DL (ref 70–99)
HBA1C MFR BLD: 5.8 % (ref 0–6.4)
POTASSIUM SERPL-SCNC: 3.5 MMOL/L (ref 3.4–5.3)
PROT SERPL-MCNC: 7.7 G/DL (ref 6.8–8.8)
SODIUM SERPL-SCNC: 141 MMOL/L (ref 133–144)

## 2018-03-30 PROCEDURE — 36415 COLL VENOUS BLD VENIPUNCTURE: CPT | Performed by: FAMILY MEDICINE

## 2018-03-30 PROCEDURE — 82043 UR ALBUMIN QUANTITATIVE: CPT | Performed by: FAMILY MEDICINE

## 2018-03-30 PROCEDURE — 80053 COMPREHEN METABOLIC PANEL: CPT | Performed by: FAMILY MEDICINE

## 2018-03-30 PROCEDURE — 83036 HEMOGLOBIN GLYCOSYLATED A1C: CPT | Performed by: FAMILY MEDICINE

## 2018-03-30 PROCEDURE — 99214 OFFICE O/P EST MOD 30 MIN: CPT | Performed by: FAMILY MEDICINE

## 2018-03-30 RX ORDER — LOSARTAN POTASSIUM 25 MG/1
25 TABLET ORAL DAILY
Qty: 90 TABLET | Refills: 0 | Status: SHIPPED | OUTPATIENT
Start: 2018-03-30 | End: 2018-08-27

## 2018-03-30 NOTE — PROGRESS NOTES
SUBJECTIVE:   Sanjuana Salamanca is a 63 year old female who presents to clinic today for the following health issues:    Hypertension Follow-up    Outpatient blood pressures are being checked at home.  Results are 160/100.    Low Salt Diet: not monitoring salt    Amount of exercise or physical activity: 1 day/week for an average of 15-30 minutes    Problems taking medications regularly: No    Medication side effects: none    Diet: regular (no restrictions)    Pt has new BP at home and her BP machine is showing high numbers , so she was worried and wanted to get her BP rechecked.     Pt denies CP, palpitations, SOB, dizziness, LOC etc.     She has been having HA, last 4-5 days. No tingling, numbness in hands or legs. No double vision. Only once she had blurry vision.   Not sleeping well at night. She sleeps at night for 3 hrs, then at 8 : she goes back to bed and sleeps for 3 hrs.   SUMMERS does not wake her up from sleep.   She took tylenol, did not help as much.   Feels tired lately.   Drinking lot of water. More than usual.     Problem list and histories reviewed & adjusted, as indicated.  Additional history: as documented    Patient Active Problem List   Diagnosis     Avitaminosis D     Esophageal reflux     OA (osteoarthritis) of knee     Advanced directives, counseling/discussion     Hyperlipidemia LDL goal <160     Cataracts, both eyes     Dry eyes     Hypokalemia     Prediabetes     Essential hypertension with goal blood pressure less than 140/90     History reviewed. No pertinent surgical history.    Social History   Substance Use Topics     Smoking status: Never Smoker     Smokeless tobacco: Never Used     Alcohol use No     Family History   Problem Relation Age of Onset     Hypertension Mother      Thyroid Disease Mother      DIABETES Other      CEREBROVASCULAR DISEASE Other      Glaucoma No family hx of      Macular Degeneration No family hx of      CANCER No family hx of          Current Outpatient Prescriptions    Medication Sig Dispense Refill     MAPAP 500 MG tablet        ranitidine (ZANTAC) 150 MG tablet Take 1 tablet (150 mg) by mouth 2 times daily 180 tablet 1     acetaminophen (TYLENOL) 325 MG tablet Take 1-2 tablets (325-650 mg) by mouth every 6 hours as needed for mild pain 100 tablet 3     diclofenac (VOLTAREN) 50 MG EC tablet TAKE 1 TABLET(50 MG) BY MOUTH THREE TIMES DAILY AS NEEDED FOR MODERATE PAIN 90 tablet 0     Multiple Vitamins-Minerals (MULTI COMPLETE) CAPS Take 1 capsule by mouth daily 100 capsule 3     metoprolol (LOPRESSOR) 25 MG tablet Take 1 tablet (25 mg) by mouth 2 times daily 180 tablet 0     polyethylene glycol (MIRALAX) powder Take 17 g (1 capful) by mouth daily 510 g 1     cholecalciferol (VITAMIN D3) 1000 UNIT tablet Take 1 tablet (1,000 Units) by mouth daily (Patient not taking: Reported on 3/30/2018) 100 tablet 3     Allergies   Allergen Reactions     Valsartan Cough     Recent Labs   Lab Test  08/14/17   1620  02/06/17   1524   01/15/16   1220   07/29/15   1231  09/19/14   1533  04/14/14   1555   A1C   --   5.7   --   6.0   --   6.2*   --    --    LDL   --    --    --    --    --   135*  138*  165*   HDL   --    --    --    --    --   32*  42*  34*   TRIG   --    --    --    --    --   220*  181*  137   ALT   --   30   --   32   --   28   --    --    CR   --   0.66   --   0.57   < >  0.48*  0.61   --    GFRESTIMATED   --   >90  Non  GFR Calc     --   >90  Non  GFR Calc     < >  >90  Non  GFR Calc    >90  Non  GFR Calc     --    GFRESTBLACK   --   >90   GFR Calc     --   >90   GFR Calc     < >  >90   GFR Calc    >90   GFR Calc     --    POTASSIUM  3.9  3.5   < >  3.7   < >  3.1*  3.7  3.6   TSH   --   1.35   --    --    --   1.72   --    --     < > = values in this interval not displayed.      BP Readings from Last 3 Encounters:   03/30/18 138/84   01/25/18 142/90  "  01/05/18 147/81    Wt Readings from Last 3 Encounters:   03/30/18 172 lb (78 kg)   01/25/18 171 lb (77.6 kg)   01/05/18 168 lb (76.2 kg)                  Labs reviewed in EPIC    Reviewed and updated as needed this visit by clinical staff  Tobacco  Allergies  Meds  Med Hx  Surg Hx  Fam Hx  Soc Hx      Reviewed and updated as needed this visit by Provider         ROS:  Constitutional, HEENT, cardiovascular, pulmonary, gi and gu systems are negative, except as otherwise noted.    OBJECTIVE:     /84 (BP Location: Left arm, Patient Position: Sitting, Cuff Size: Adult Large)  Pulse 83  Temp 97.8  F (36.6  C)  Ht 5' 2.75\" (1.594 m)  Wt 172 lb (78 kg)  BMI 30.71 kg/m2  Body mass index is 30.71 kg/(m^2).  GENERAL: healthy, alert and no distress  HENT: ear canals and TM's normal, nose and mouth without ulcers or lesions  NECK: no adenopathy, no asymmetry, masses, or scars and thyroid normal to palpation  RESP: lungs clear to auscultation - no rales, rhonchi or wheezes  CV: regular rate and rhythm, normal S1 S2, no S3 or S4, no murmur, click or rub, no peripheral edema and peripheral pulses strong  ABDOMEN: soft, nontender, no hepatosplenomegaly, no masses and bowel sounds normal  MS: no gross musculoskeletal defects noted, no edema  NEURO: Normal strength and tone, mentation intact and speech normal    ASSESSMENT/PLAN:       ICD-10-CM    1. Essential hypertension with goal blood pressure less than 140/90 I10 Comprehensive metabolic panel (BMP + Alb, Alk Phos, ALT, AST, Total. Bili, TP)     losartan (COZAAR) 25 MG tablet     Albumin Random Urine Quantitative with Creat Ratio     CANCELED: BASIC METABOLIC PANEL   2. Prediabetes R73.03 Hemoglobin A1c   3. Hypokalemia E87.6 Comprehensive metabolic panel (BMP + Alb, Alk Phos, ALT, AST, Total. Bili, TP)     losartan (COZAAR) 25 MG tablet   4. Polydipsia R63.1      Her home BP machine is checked with clinic machine. Home machine reads approx 10 points higher than " our machine. Per pt she checked her BP when she had HA and numbers were higher, in 180s-190s,  she is very worried.   She is always worried about low potassium level ( K has been normal, checked several times in past)  Small dose of Losartan added, explained should help with HTN and potassium.    No acute neurological deficits. HA cold be sleep deprivation. Observe , if gets worse or develop red flag symptoms then needs to be seen by provider.  .       Sweetie Gao MD  [facility]

## 2018-03-30 NOTE — LETTER
[facility]   [address] [place]  [address]  [phone]                                   April 3, 2018    Sanjuana Salamanca  [address]    [facility] [address]        Dear Sanjuana,    Your urine test for proteins in urine looks good!     Sodium ,potassium, kidney function, liver function are normal.     Hemoglobin A1c ( test for diabetes) looks good, you DO NOT have diabetes. Limit sweets in your diet and perform regular exercise to keep up with the blood sugar level.     Results for orders placed or performed in visit on 03/30/18   Comprehensive metabolic panel (BMP + Alb, Alk Phos, ALT, AST, Total. Bili, TP)   Result Value Ref Range    Sodium 141 133 - 144 mmol/L    Potassium 3.5 3.4 - 5.3 mmol/L    Chloride 104 94 - 109 mmol/L    Carbon Dioxide 27 20 - 32 mmol/L    Anion Gap 10 3 - 14 mmol/L    Glucose 113 (H) 70 - 99 mg/dL    Urea Nitrogen 12 7 - 30 mg/dL    Creatinine 0.54 0.52 - 1.04 mg/dL    GFR Estimate >90 >60 mL/min/1.7m2    GFR Estimate If Black >90 >60 mL/min/1.7m2    Calcium 9.5 8.5 - 10.1 mg/dL    Bilirubin Total 0.3 0.2 - 1.3 mg/dL    Albumin 3.7 3.4 - 5.0 g/dL    Protein Total 7.7 6.8 - 8.8 g/dL    Alkaline Phosphatase 76 40 - 150 U/L    ALT 29 0 - 50 U/L    AST 26 0 - 45 U/L   Hemoglobin A1c   Result Value Ref Range    Hemoglobin A1C 5.8 0 - 6.4 %   Albumin Random Urine Quantitative with Creat Ratio   Result Value Ref Range    Creatinine Urine 243 mg/dL    Albumin Urine mg/L 40 mg/L    Albumin Urine mg/g Cr 16.54 0 - 25 mg/g Cr       If you have any questions please call the clinic at [phone]    Sincerely,    Sweetie Gao MD  bmd

## 2018-03-30 NOTE — MR AVS SNAPSHOT
"              After Visit Summary   3/30/2018    Sanjuana Salamanca    MRN: [id number]           Patient Information     Date Of Birth          1955        Visit Information        Provider Department      3/30/2018 3:00 PM [facility]ao [facility]        Today's Diagnoses     Essential hypertension with goal blood pressure less than 140/90    -  1    Prediabetes        Hypokalemia           Follow-ups after your visit        Who to contact     If you have questions or need follow up information about today's clinic visit or your schedule please contact [facility] directly at [phone].  Normal or non-critical lab and imaging results will be communicated to you by MyChart, letter or phone within 4 business days after the clinic has received the results. If you do not hear from us within 7 days, please contact the clinic through [employer] or phone. If you have a critical or abnormal lab result, we will notify you by phone as soon as possible.  Submit refill requests through Dealo or call your pharmacy and they will forward the refill request to us. Please allow 3 business days for your refill to be completed.          Additional Information About Your Visit        MyChart Information     Dealo lets you send messages to your doctor, view your test results, renew your prescriptions, schedule appointments and more. To sign up, go to [email or web address] . Click on \"Log in\" on the left side of the screen, which will take you to the Welcome page. Then click on \"Sign up Now\" on the right side of the page.     You will be asked to enter the access code listed below, as well as some personal information. Please follow the directions to create your username and password.     Your access code is: [email or web address]  Expires: 2018  4:11 PM     Your access code will  in 90 days. If you need help or a new code, please call your [facility] or " "[phone].        Care EveryWhere ID     This is your Care EveryWhere ID. This could be used by other organizations to access your [facility] medical records  [id number]        Your Vitals Were     Pulse Temperature Height BMI (Body Mass Index)          83 97.8  F (36.6  C) 5' 2.75\" (1.594 m) 30.71 kg/m2         Blood Pressure from Last 3 Encounters:   03/30/18 138/84   01/25/18 142/90   01/05/18 147/81    Weight from Last 3 Encounters:   03/30/18 172 lb (78 kg)   01/25/18 171 lb (77.6 kg)   01/05/18 168 lb (76.2 kg)              We Performed the Following     Albumin Random Urine Quantitative with Creat Ratio     Comprehensive metabolic panel (BMP + Alb, Alk Phos, ALT, AST, Total. Bili, TP)     Hemoglobin A1c          Today's Medication Changes          These changes are accurate as of 3/30/18  4:11 PM.  If you have any questions, ask your nurse or doctor.               Start taking these medicines.        Dose/Directions    losartan 25 MG tablet   Commonly known as:  COZAAR   Used for:  Essential hypertension with goal blood pressure less than 140/90, Hypokalemia   Started by:  Sweetie Gao MD        Dose:  25 mg   Take 1 tablet (25 mg) by mouth daily   Quantity:  90 tablet   Refills:  0            Where to get your medicines      These medications were sent to [facility] [address] [place], [place] - [address] AT [facility] [address]  [address] [facility] [address]     Phone:  [phone]     losartan 25 MG tablet                Primary Care Provider Office Phone # Fax #    Sweetie Gao [phone] [address] [facility] [address]        Equal Access to Services     ROSA SINCLAIR AH: [place], victorino casper, qaybta kaalmalats rodgers, talisha feliz. So [facility] [phone].    ATENCIÓN: Si habla español, tiene a ross disposición servicios gratuitos de asistencia lingüística. " Jeremiah mendoza [phone].    We comply with applicable federal civil rights laws and [place] laws. We do not discriminate on the basis of race, color, national origin, age, disability, sex, sexual orientation, or gender identity.            Thank you!     Thank you for choosing [facility]  for your care. Our goal is always to provide you with excellent care. Hearing back from our patients is one way we can continue to improve our services. Please take a few minutes to complete the written survey that you may receive in the mail after your visit with us. Thank you!             Your Updated Medication List - Protect others around you: Learn how to safely use, store and throw away your medicines at www.disposemymeds.org.          This list is accurate as of 3/30/18  4:11 PM.  Always use your most recent med list.                   Brand Name Dispense Instructions for use Diagnosis    * MAPAP 500 MG tablet   Generic drug:  acetaminophen           * acetaminophen 325 MG tablet    TYLENOL    100 tablet    Take 1-2 tablets (325-650 mg) by mouth every 6 hours as needed for mild pain    Primary osteoarthritis of both knees       cholecalciferol 1000 UNIT tablet    vitamin D3    100 tablet    Take 1 tablet (1,000 Units) by mouth daily    Avitaminosis D       diclofenac 50 MG EC tablet    VOLTAREN    90 tablet    TAKE 1 TABLET(50 MG) BY MOUTH THREE TIMES DAILY AS NEEDED FOR MODERATE PAIN    Osteoarthritis of knee, unspecified laterality, unspecified osteoarthritis type       losartan 25 MG tablet    COZAAR    90 tablet    Take 1 tablet (25 mg) by mouth daily    Essential hypertension with goal blood pressure less than 140/90, Hypokalemia       metoprolol tartrate 25 MG tablet    LOPRESSOR    180 tablet    Take 1 tablet (25 mg) by mouth 2 times daily    Hypertension goal BP (blood pressure) < 140/90       MULTI COMPLETE Caps     100 capsule    Take 1 capsule by mouth daily    Nutritional deficiency        polyethylene glycol powder    MIRALAX    510 g    Take 17 g (1 capful) by mouth daily    Constipation, unspecified constipation type       ranitidine 150 MG tablet    ZANTAC    180 tablet    Take 1 tablet (150 mg) by mouth 2 times daily    Gastroesophageal reflux disease, esophagitis presence not specified       * Notice:  This list has 2 medication(s) that are the same as other medications prescribed for you. Read the directions carefully, and ask your doctor or other care provider to review them with you.

## 2018-04-02 LAB
CREAT UR-MCNC: 243 MG/DL
MICROALBUMIN UR-MCNC: 40 MG/L
MICROALBUMIN/CREAT UR: 16.54 MG/G CR (ref 0–25)

## 2018-04-02 NOTE — PROGRESS NOTES
Dear Sanjuana Salamanca,     Your urine test for proteins in urine looks good!     Sodium ,potassium, kidney function, liver function are normal.     Hemoglobin A1c ( test for diabetes) looks good, you DO NOT have diabetes. Limit sweets in your diet and perform regular exercise to keep up with the blood sugar level.     Sweetie Gao MD.   Family Physician.  [facility] [place].

## 2018-05-07 ENCOUNTER — TELEPHONE (OUTPATIENT)
Dept: FAMILY MEDICINE | Facility: CLINIC | Age: 63
End: 2018-05-07

## 2018-05-07 ENCOUNTER — OFFICE VISIT (OUTPATIENT)
Dept: FAMILY MEDICINE | Facility: CLINIC | Age: 63
End: 2018-05-07
Payer: COMMERCIAL

## 2018-05-07 VITALS
WEIGHT: 174 LBS | BODY MASS INDEX: 31.07 KG/M2 | DIASTOLIC BLOOD PRESSURE: 89 MMHG | TEMPERATURE: 97.4 F | HEART RATE: 68 BPM | SYSTOLIC BLOOD PRESSURE: 155 MMHG

## 2018-05-07 DIAGNOSIS — Z23 IMMUNIZATION DUE: ICD-10-CM

## 2018-05-07 DIAGNOSIS — I10 ESSENTIAL HYPERTENSION WITH GOAL BLOOD PRESSURE LESS THAN 140/90: Primary | ICD-10-CM

## 2018-05-07 PROCEDURE — 90471 IMMUNIZATION ADMIN: CPT | Performed by: FAMILY MEDICINE

## 2018-05-07 PROCEDURE — 99213 OFFICE O/P EST LOW 20 MIN: CPT | Mod: 25 | Performed by: FAMILY MEDICINE

## 2018-05-07 PROCEDURE — 90472 IMMUNIZATION ADMIN EACH ADD: CPT | Performed by: FAMILY MEDICINE

## 2018-05-07 PROCEDURE — 90715 TDAP VACCINE 7 YRS/> IM: CPT | Performed by: FAMILY MEDICINE

## 2018-05-07 PROCEDURE — 90734 MENACWYD/MENACWYCRM VACC IM: CPT | Performed by: FAMILY MEDICINE

## 2018-05-07 NOTE — TELEPHONE ENCOUNTER
Reason for Call:  Other - Patient Question: Immunizations    Detailed comments: Patient would like a call back from a nurse to find out if she is due for any immunizations and to go over which ones she has had. Please call back to discuss.    Phone Number Patient can be reached at: Home number on file [phone] (home)    Best Time: Anytime    Can we leave a detailed message on this number? YES    Call taken on 5/7/2018 at 1:36 PM by Cherry Vasques

## 2018-05-07 NOTE — MR AVS SNAPSHOT
"              After Visit Summary   2018    Sanjuana Salamanca    MRN: [id number]           Patient Information     Date Of Birth          1955        Visit Information        Provider Department      2018 4:00 PM Sweetie Gao MD [facility]        Today's Diagnoses     Essential hypertension with goal blood pressure less than 140/90    -  1    Immunization due        Need for prophylactic vaccination and inoculation against influenza           Follow-ups after your visit        Who to contact     If you have questions or need follow up information about today's clinic visit or your schedule please contact [facility] directly at [phone].  Normal or non-critical lab and imaging results will be communicated to you by MyChart, letter or phone within 4 business days after the clinic has received the results. If you do not hear from us within 7 days, please contact the clinic through MyChart or phone. If you have a critical or abnormal lab result, we will notify you by phone as soon as possible.  Submit refill requests through [employer] or call your pharmacy and they will forward the refill request to us. Please allow 3 business days for your refill to be completed.          Additional Information About Your Visit        MyChart Information     [employer] lets you send messages to your doctor, view your test results, renew your prescriptions, schedule appointments and more. To sign up, go to [email or web address] . Click on \"Log in\" on the left side of the screen, which will take you to the Welcome page. Then click on \"Sign up Now\" on the right side of the page.     You will be asked to enter the access code listed below, as well as some personal information. Please follow the directions to create your username and password.     Your access code is: [email or web address]  Expires: 2018  4:11 PM     Your access code will  in 90 days. If you need help or a " new code, please call your [place] clinic or [phone].        Care EveryWhere ID     This is your Care EveryWhere ID. This could be used by other organizations to access your [place] medical records  [id number]        Your Vitals Were     Pulse Temperature BMI (Body Mass Index)             68 97.4  F (36.3  C) (Oral) 31.07 kg/m2          Blood Pressure from Last 3 Encounters:   05/07/18 155/89   03/30/18 138/84   01/25/18 142/90    Weight from Last 3 Encounters:   05/07/18 174 lb (78.9 kg)   03/30/18 172 lb (78 kg)   01/25/18 171 lb (77.6 kg)              We Performed the Following     FLU VACCINE, 3 YRS +, IM (QUADRIVALENT W/PRESERVATIVES/MULTI-DOSE) [28001]     Vaccine Administration, Initial [39711]        Primary Care Provider Office Phone # Fax #    Sweetie Brayden Gao [phone]       [address] [facility] [address]        Equal Access to Services     [facility]: Hadii sravanthi gray hadasho So[place]ana, waaxda luqadaha, qaybta kaalmada ana maria, talisha ley . So [facility] [phone].    ATENCIÓN: Si habla español, tiene a ross disposición servicios gratuitos de asistencia lingüística. Llame al [phone].    We comply with applicable federal civil rights laws and [place] laws. We do not discriminate on the basis of race, color, national origin, age, disability, sex, sexual orientation, or gender identity.            Thank you!     Thank you for choosing [facility]  for your care. Our goal is always to provide you with excellent care. Hearing back from our patients is one way we can continue to improve our services. Please take a few minutes to complete the written survey that you may receive in the mail after your visit with us. Thank you!             Your Updated Medication List - Protect others around you: Learn how to safely use, store and throw away your medicines at www.disposemymeds.org.          This list is  accurate as of 5/7/18  5:02 PM.  Always use your most recent med list.                   Brand Name Dispense Instructions for use Diagnosis    * MAPAP 500 MG tablet   Generic drug:  acetaminophen           * acetaminophen 325 MG tablet    TYLENOL    100 tablet    Take 1-2 tablets (325-650 mg) by mouth every 6 hours as needed for mild pain    Primary osteoarthritis of both knees       cholecalciferol 1000 UNIT tablet    vitamin D3    100 tablet    Take 1 tablet (1,000 Units) by mouth daily    Avitaminosis D       diclofenac 50 MG EC tablet    VOLTAREN    90 tablet    TAKE 1 TABLET(50 MG) BY MOUTH THREE TIMES DAILY AS NEEDED FOR MODERATE PAIN    Osteoarthritis of knee, unspecified laterality, unspecified osteoarthritis type       losartan 25 MG tablet    COZAAR    90 tablet    Take 1 tablet (25 mg) by mouth daily    Essential hypertension with goal blood pressure less than 140/90, Hypokalemia       metoprolol tartrate 25 MG tablet    LOPRESSOR    180 tablet    Take 1 tablet (25 mg) by mouth 2 times daily    Hypertension goal BP (blood pressure) < 140/90       MULTI COMPLETE Caps     100 capsule    Take 1 capsule by mouth daily    Nutritional deficiency       polyethylene glycol powder    MIRALAX    510 g    Take 17 g (1 capful) by mouth daily    Constipation, unspecified constipation type       ranitidine 150 MG tablet    ZANTAC    180 tablet    Take 1 tablet (150 mg) by mouth 2 times daily    Gastroesophageal reflux disease, esophagitis presence not specified       * Notice:  This list has 2 medication(s) that are the same as other medications prescribed for you. Read the directions carefully, and ask your doctor or other care provider to review them with you.

## 2018-05-07 NOTE — PROGRESS NOTES
SUBJECTIVE:   Sanjuana Salamanca is a 63 year old female who presents to clinic today for the following health issues:      Hypertension Follow-up :     Outpatient blood pressures are not being checked.    Low Salt Diet: not monitoring salt    Amount of exercise or physical activity: None    Problems taking medications regularly: No    Medication side effects: none    Diet: regular (no restrictions)    Patient would also like to discuss immunization record.    She wants to go to [facility] for Ramadan. She will be traveling in last 10 days of Ramadan.  She has to get the documents for her Visa application in 2 days.   Per daughter she needs influenza and meningitis and Tdap.    Pt wants to take only 2 vaccines today, Tdap and meningococcal recommended.      Problem list and histories reviewed & adjusted, as indicated.  Additional history: as documented    Patient Active Problem List   Diagnosis     Avitaminosis D     Esophageal reflux     OA (osteoarthritis) of knee     Advanced directives, counseling/discussion     Hyperlipidemia LDL goal <160     Cataracts, both eyes     Dry eyes     Hypokalemia     Prediabetes     Essential hypertension with goal blood pressure less than 140/90     History reviewed. No pertinent surgical history.    Social History   Substance Use Topics     Smoking status: Never Smoker     Smokeless tobacco: Never Used     Alcohol use No     Family History   Problem Relation Age of Onset     Hypertension Mother      Thyroid Disease Mother      DIABETES Other      CEREBROVASCULAR DISEASE Other      Glaucoma No family hx of      Macular Degeneration No family hx of      CANCER No family hx of          Current Outpatient Prescriptions   Medication Sig Dispense Refill     acetaminophen (TYLENOL) 325 MG tablet Take 1-2 tablets (325-650 mg) by mouth every 6 hours as needed for mild pain 100 tablet 3     cholecalciferol (VITAMIN D3) 1000 UNIT tablet Take 1 tablet (1,000 Units) by mouth daily 100 tablet 3      diclofenac (VOLTAREN) 50 MG EC tablet TAKE 1 TABLET(50 MG) BY MOUTH THREE TIMES DAILY AS NEEDED FOR MODERATE PAIN 90 tablet 0     losartan (COZAAR) 25 MG tablet Take 1 tablet (25 mg) by mouth daily 90 tablet 0     MAPAP 500 MG tablet        metoprolol (LOPRESSOR) 25 MG tablet Take 1 tablet (25 mg) by mouth 2 times daily 180 tablet 0     Multiple Vitamins-Minerals (MULTI COMPLETE) CAPS Take 1 capsule by mouth daily 100 capsule 3     polyethylene glycol (MIRALAX) powder Take 17 g (1 capful) by mouth daily 510 g 1     ranitidine (ZANTAC) 150 MG tablet Take 1 tablet (150 mg) by mouth 2 times daily 180 tablet 1     Allergies   Allergen Reactions     Valsartan Cough     Recent Labs   Lab Test  03/30/18   1621  08/14/17   1620  02/06/17   1524   01/15/16   1220   07/29/15   1231   09/19/14   1533  04/14/14   1555   A1C  5.8   --   5.7   --   6.0   --   6.2*   < >   --    --    LDL   --    --    --    --    --    --   135*   --   138*  165*   HDL   --    --    --    --    --    --   32*   --   42*  34*   TRIG   --    --    --    --    --    --   220*   --   181*  137   ALT  29   --   30   --   32   --   28   < >   --    --    CR  0.54   --   0.66   --   0.57   < >  0.48*   --   0.61   --    GFRESTIMATED  >90   --   >90  Non  GFR Calc     --   >90  Non  GFR Calc     < >  >90  Non  GFR Calc     --   >90  Non  GFR Calc     --    GFRESTBLACK  >90   --   >90   GFR Calc     --   >90   GFR Calc     < >  >90   GFR Calc     --   >90   GFR Calc     --    POTASSIUM  3.5  3.9  3.5   < >  3.7   < >  3.1*   --   3.7  3.6   TSH   --    --   1.35   --    --    --   1.72   --    --    --     < > = values in this interval not displayed.      BP Readings from Last 3 Encounters:   05/07/18 155/89   03/30/18 138/84   01/25/18 142/90    Wt Readings from Last 3 Encounters:   05/07/18 174 lb (78.9 kg)   03/30/18 172  lb (78 kg)   01/25/18 171 lb (77.6 kg)           Labs reviewed in EPIC    Reviewed and updated as needed this visit by clinical staff  Tobacco  Allergies  Meds  Med Hx  Surg Hx  Fam Hx  Soc Hx      Reviewed and updated as needed this visit by Provider         ROS:  Constitutional, HEENT, cardiovascular, pulmonary, gi and gu systems are negative, except as otherwise noted.    OBJECTIVE:     /89 (BP Location: Left arm, Patient Position: Sitting, Cuff Size: Adult Regular)  Pulse 68  Temp 97.4  F (36.3  C) (Oral)  Wt 174 lb (78.9 kg)  BMI 31.07 kg/m2  Body mass index is 31.07 kg/(m^2).  GENERAL: healthy, alert and no distress  RESP: lungs clear to auscultation - no rales, rhonchi or wheezes  CV: regular rate and rhythm, normal S1 S2, no S3 or S4    ASSESSMENT/PLAN:       ICD-10-CM    1. Essential hypertension with goal blood pressure less than 140/90 I10 TDAP VACCINE (ADACEL)     MENINGOCOCCAL VACCINE,IM (MENACTRA)     VACCINE ADMINISTRATION, INITIAL     VACCINE ADMINISTRATION, EACH ADDITIONAL   2. Immunization due Z23 CANCELED: FLU VACCINE, 3 YRS +, IM (QUADRIVALENT W/PRESERVATIVES/MULTI-DOSE) [61842]     CANCELED: Vaccine Administration, Initial [39800]   3. Need for prophylactic vaccination and inoculation against influenza Z23 TDAP VACCINE (ADACEL)     MENINGOCOCCAL VACCINE,IM (MENACTRA)     VACCINE ADMINISTRATION, INITIAL     VACCINE ADMINISTRATION, EACH ADDITIONAL     CANCELED: FLU VACCINE, 3 YRS +, IM (QUADRIVALENT W/PRESERVATIVES/MULTI-DOSE) [95486]     CANCELED: Vaccine Administration, Initial [09501]     Pt could not tell me properly if she is taking both of her BP medications or not. Advised to take both, metoprolol and losartan, f/u with [place] Pharmacist for BP recheck.       Sweetie Gao MD  [facility]

## 2018-05-16 ENCOUNTER — TELEPHONE (OUTPATIENT)
Dept: FAMILY MEDICINE | Facility: CLINIC | Age: 63
End: 2018-05-16

## 2018-05-16 NOTE — TELEPHONE ENCOUNTER
Reason for call:  Patient reporting a symptom    Symptom or request: Cold and cough    Duration (how long have symptoms been present): 2 weeks    Have you been treated for this before? Yes    Additional comments: Patient's daughter, Kaycee, called and stated patient had the same symptoms as she did back in January this year. She stated she had seen Dr. Gao and was prescribed amoxicillin-clavulanate (AUGMENTIN) 875-125 MG per tablet and some form of liquid medication as well but patient did not know the name. She is requesting to get refills on both medications since her symptoms are the same. She asked for a call back form Dr. Gao's care team to discuss.     Phone Number patient can be reached at:  Cell number on file:    Telephone Information:   Mobile [phone]       Best Time:  Anytime    Can we leave a detailed message on this number:  YES    Call taken on 5/16/2018 at 1:16 PM by Cherry Vasques

## 2018-05-16 NOTE — TELEPHONE ENCOUNTER
Called   Sanjuana Salamanca (Self) [phone] (M)     Left a message on voicemail to return call to triage line at [phone].    Taylor Michaud RN [facility] Triage.

## 2018-05-17 ENCOUNTER — OFFICE VISIT (OUTPATIENT)
Dept: FAMILY MEDICINE | Facility: CLINIC | Age: 63
End: 2018-05-17
Payer: COMMERCIAL

## 2018-05-17 VITALS
TEMPERATURE: 97.9 F | DIASTOLIC BLOOD PRESSURE: 82 MMHG | WEIGHT: 170.4 LBS | BODY MASS INDEX: 30.43 KG/M2 | SYSTOLIC BLOOD PRESSURE: 136 MMHG | OXYGEN SATURATION: 99 % | HEART RATE: 65 BPM

## 2018-05-17 DIAGNOSIS — R05.3 CHRONIC COUGH: Primary | ICD-10-CM

## 2018-05-17 PROCEDURE — 99213 OFFICE O/P EST LOW 20 MIN: CPT | Performed by: PHYSICIAN ASSISTANT

## 2018-05-17 RX ORDER — FLUTICASONE PROPIONATE 220 UG/1
2 AEROSOL, METERED RESPIRATORY (INHALATION) 2 TIMES DAILY
Qty: 3 INHALER | Refills: 1 | Status: SHIPPED | OUTPATIENT
Start: 2018-05-17 | End: 2018-08-27

## 2018-05-17 RX ORDER — CODEINE PHOSPHATE AND GUAIFENESIN 10; 100 MG/5ML; MG/5ML
1 SOLUTION ORAL EVERY 4 HOURS PRN
Qty: 120 ML | Refills: 0 | Status: SHIPPED | OUTPATIENT
Start: 2018-05-17 | End: 2018-05-31

## 2018-05-17 RX ORDER — ALBUTEROL SULFATE 90 UG/1
2 AEROSOL, METERED RESPIRATORY (INHALATION) EVERY 6 HOURS PRN
Qty: 1 INHALER | Refills: 1 | Status: SHIPPED | OUTPATIENT
Start: 2018-05-17 | End: 2018-08-27

## 2018-05-17 RX ORDER — PREDNISONE 20 MG/1
TABLET ORAL
Qty: 20 TABLET | Refills: 0 | Status: SHIPPED | OUTPATIENT
Start: 2018-05-17 | End: 2018-06-29

## 2018-05-17 NOTE — PATIENT INSTRUCTIONS
Use your flovent ( orange inhaler) 2 puffs in the morning and 2 puffs at night.     Use your albtuerol (either red or blue) 2 puffs every 4 hours.       Start the prednisone today.     Use the cough medication as needed.

## 2018-05-17 NOTE — PROGRESS NOTES
SUBJECTIVE:   Sanjuana Salamanca is a 63 year old female who presents to clinic today for the following health issues:      Acute Illness   Acute illness concerns: URI  Onset: 1 day    Fever: no    Chills/Sweats: no    Headache (location?): YES    Sinus Pressure:YES    Conjunctivitis:  no    Ear Pain: no    Rhinorrhea: YES    Congestion: YES    Sore Throat: no      Cough: YES-non-productive    Wheeze: no     Decreased Appetite: no     Nausea: no     Vomiting: no     Diarrhea:  no     Dysuria/Freq.: no     Fatigue/Achiness: YES- sometimes    Sick/Strep Exposure: no     Therapies Tried and outcome: OTC Robitussin     She has been seen in the past regularly for a chronic cough. She had last been seen by [facility]ergy for this 1/2018. She had been prescribed flovent and albuterol and a CT scan was recommended. Patient's med list does not show her inhalers and patient believes they are out. Patient is still very anxious about getting a CT scan done.   Felt like she was feverish last week, but none for 3 days.   Using robitussin and claritin D   Not getting any mucus up with her cough.   No nausea or vomiting.   This cough feels like her chronic one.   Not sleeping at night because of the cough.     Problem list and histories reviewed & adjusted, as indicated.  Additional history: as documented    Patient Active Problem List   Diagnosis     Avitaminosis D     Esophageal reflux     OA (osteoarthritis) of knee     Advanced directives, counseling/discussion     Hyperlipidemia LDL goal <160     Cataracts, both eyes     Dry eyes     Hypokalemia     Prediabetes     Essential hypertension with goal blood pressure less than 140/90     History reviewed. No pertinent surgical history.    Social History   Substance Use Topics     Smoking status: Never Smoker     Smokeless tobacco: Never Used     Alcohol use No     Family History   Problem Relation Age of Onset     Hypertension Mother      Thyroid Disease Mother      DIABETES Other       CEREBROVASCULAR DISEASE Other      Glaucoma No family hx of      Macular Degeneration No family hx of      CANCER No family hx of            Reviewed and updated as needed this visit by clinical staff  Tobacco  Allergies  Meds  Problems  Med Hx  Surg Hx  Fam Hx  Soc Hx        Reviewed and updated as needed this visit by Provider  Allergies  Meds  Problems         ROS:  Constitutional, HEENT, cardiovascular, pulmonary, gi and gu systems are negative, except as otherwise noted.    OBJECTIVE:     /82 (BP Location: Left arm, Patient Position: Chair, Cuff Size: Adult Large)  Pulse 65  Temp 97.9  F (36.6  C) (Oral)  Wt 170 lb 6.4 oz (77.3 kg)  SpO2 99%  Breastfeeding? No  BMI 30.43 kg/m2  Body mass index is 30.43 kg/(m^2).  GENERAL: healthy, alert and no distress  HENT: ear canals and TM's normal, nose and mouth without ulcers or lesions  NECK: no adenopathy, no asymmetry, masses, or scars and thyroid normal to palpation  RESP: lungs clear to auscultation - no rales, rhonchi or wheezes  CV: regular rate and rhythm, normal S1 S2, no S3 or S4, no murmur, click or rub, no peripheral edema and peripheral pulses strong  PSYCH: mentation appears normal, affect normal/bright    Diagnostic Test Results:  none     ASSESSMENT/PLAN:       ICD-10-CM    1. Chronic cough R05 fluticasone (FLOVENT HFA) 220 MCG/ACT Inhaler     albuterol (PROAIR HFA/PROVENTIL HFA/VENTOLIN HFA) 108 (90 Base) MCG/ACT Inhaler     guaiFENesin-codeine (ROBITUSSIN AC) 100-10 MG/5ML SOLN solution     predniSONE (DELTASONE) 20 MG tablet   Cough does not appear bacterial. Patient to use prednisone and restart her inhalers. Clarified directions for her. She should schedule an allergy follow up.   Can use robitussin AC at night to help sleep.   Encouraged patient to consider mammo, FIT and pap smear. She is resistant to this.     FUTURE APPOINTMENTS:       - Follow-up for annual visit or as needed    Drea Denise PA-C  [facility]  [facility]

## 2018-05-17 NOTE — MR AVS SNAPSHOT
"              After Visit Summary   5/17/2018    Sanjuana Salamanca    MRN: [id number]           Patient Information     Date Of Birth          1955        Visit Information        Provider Department      5/17/2018 12:40 PM Drea Denise PA-C [facility]        Today's Diagnoses     Chronic cough    -  1      Care Instructions    Use your flovent ( orange inhaler) 2 puffs in the morning and 2 puffs at night.     Use your albtuerol (either red or blue) 2 puffs every 4 hours.       Start the prednisone today.     Use the cough medication as needed.           Follow-ups after your visit        Who to contact     If you have questions or need follow up information about today's clinic visit or your schedule please contact [facility] directly at [phone].  Normal or non-critical lab and imaging results will be communicated to you by MyChart, letter or phone within 4 business days after the clinic has received the results. If you do not hear from us within 7 days, please contact the clinic through MyChart or phone. If you have a critical or abnormal lab result, we will notify you by phone as soon as possible.  Submit refill requests through [facility] or call your pharmacy and they will forward the refill request to us. Please allow 3 business days for your refill to be completed.          Additional Information About Your Visit        MyChart Information     [facility] lets you send messages to your doctor, view your test results, renew your prescriptions, schedule appointments and more. To sign up, go to [email or web address] . Click on \"Log in\" on the left side of the screen, which will take you to the Welcome page. Then click on \"Sign up Now\" on the right side of the page.     You will be asked to enter the access code listed below, as well as some personal information. Please follow the directions to create your username and password.     Your access code is: " [email or web address]  Expires: 2018  4:11 PM     Your access code will  in 90 days. If you need help or a new code, please call your [place] clinic or [phone].        Care EveryWhere ID     This is your Care EveryWhere ID. This could be used by other organizations to access your [place] medical records  [id number]        Your Vitals Were     Pulse Temperature Pulse Oximetry Breastfeeding? BMI (Body Mass Index)       65 97.9  F (36.6  C) (Oral) 99% No 30.43 kg/m2        Blood Pressure from Last 3 Encounters:   18 136/82   18 155/89   18 138/84    Weight from Last 3 Encounters:   18 170 lb 6.4 oz (77.3 kg)   18 174 lb (78.9 kg)   18 172 lb (78 kg)              Today, you had the following     No orders found for display         Today's Medication Changes          These changes are accurate as of 18 12:48 PM.  If you have any questions, ask your nurse or doctor.               Start taking these medicines.        Dose/Directions    albuterol 108 (90 Base) MCG/ACT Inhaler   Commonly known as:  PROAIR HFA/PROVENTIL HFA/VENTOLIN HFA   Used for:  Chronic cough   Started by:  Drea Denise PA-C        Dose:  2 puff   Inhale 2 puffs into the lungs every 6 hours as needed for shortness of breath / dyspnea or wheezing   Quantity:  1 Inhaler   Refills:  1       fluticasone 220 MCG/ACT Inhaler   Commonly known as:  FLOVENT HFA   Used for:  Chronic cough   Started by:  Drea Denise PA-C        Dose:  2 puff   Inhale 2 puffs into the lungs 2 times daily   Quantity:  3 Inhaler   Refills:  1       guaiFENesin-codeine 100-10 MG/5ML Soln solution   Commonly known as:  ROBITUSSIN AC   Used for:  Chronic cough   Started by:  Drea Denise PA-C        Dose:  1 tsp.   Take 5 mLs by mouth every 4 hours as needed for cough   Quantity:  120 mL   Refills:  0       predniSONE 20 MG tablet   Commonly known as:  DELTASONE   Used for:  Chronic cough   Started by:  Drea Denise PA-C         Take 3 tabs (60 mg) by mouth daily x 3 days, 2 tabs (40 mg) daily x 3 days, 1 tab (20 mg) daily x 3 days, then 1/2 tab (10 mg) x 3 days.   Quantity:  20 tablet   Refills:  0            Where to get your medicines      These medications were sent to [id number] - [place], [place] - [address] AT [facility] [address]  [address] [facility] [address]     Phone:  [phone]     albuterol 108 (90 Base) MCG/ACT Inhaler    fluticasone 220 MCG/ACT Inhaler    predniSONE 20 MG tablet         Some of these will need a paper prescription and others can be bought over the counter.  Ask your nurse if you have questions.     Bring a paper prescription for each of these medications     guaiFENesin-codeine 100-10 MG/5ML Soln solution                Primary Care Provider Office Phone # Fax #    Sweetie Brayden Gao [phone] [address] [facility] [address]        Equal Access to Services     ROSA SINCLAIR AH: Hadii sravanthi ku hadasho Soarchana, waaxda luqadaha, qaybta kaalmada adeegyalast, talisha feliz. So [facility] [phone].    ATENCIÓN: Si habla español, tiene a ross disposición servicios gratuitos de asistencia lingüística. Llame al [phone].    We comply with applicable federal civil rights laws and [place] laws. We do not discriminate on the basis of race, color, national origin, age, disability, sex, sexual orientation, or gender identity.            Thank you!     Thank you for choosing [facility]  for your care. Our goal is always to provide you with excellent care. Hearing back from our patients is one way we can continue to improve our services. Please take a few minutes to complete the written survey that you may receive in the mail after your visit with us. Thank you!             Your Updated Medication List - Protect others around you: Learn how to safely use, store and throw away your  medicines at www.disposemymeds.org.          This list is accurate as of 5/17/18 12:48 PM.  Always use your most recent med list.                   Brand Name Dispense Instructions for use Diagnosis    * MAPAP 500 MG tablet   Generic drug:  acetaminophen           * acetaminophen 325 MG tablet    TYLENOL    100 tablet    Take 1-2 tablets (325-650 mg) by mouth every 6 hours as needed for mild pain    Primary osteoarthritis of both knees       albuterol 108 (90 Base) MCG/ACT Inhaler    PROAIR HFA/PROVENTIL HFA/VENTOLIN HFA    1 Inhaler    Inhale 2 puffs into the lungs every 6 hours as needed for shortness of breath / dyspnea or wheezing    Chronic cough       cholecalciferol 1000 UNIT tablet    vitamin D3    100 tablet    Take 1 tablet (1,000 Units) by mouth daily    Avitaminosis D       diclofenac 50 MG EC tablet    VOLTAREN    90 tablet    TAKE 1 TABLET(50 MG) BY MOUTH THREE TIMES DAILY AS NEEDED FOR MODERATE PAIN    Osteoarthritis of knee, unspecified laterality, unspecified osteoarthritis type       fluticasone 220 MCG/ACT Inhaler    FLOVENT HFA    3 Inhaler    Inhale 2 puffs into the lungs 2 times daily    Chronic cough       guaiFENesin-codeine 100-10 MG/5ML Soln solution    ROBITUSSIN AC    120 mL    Take 5 mLs by mouth every 4 hours as needed for cough    Chronic cough       losartan 25 MG tablet    COZAAR    90 tablet    Take 1 tablet (25 mg) by mouth daily    Essential hypertension with goal blood pressure less than 140/90, Hypokalemia       metoprolol tartrate 25 MG tablet    LOPRESSOR    180 tablet    Take 1 tablet (25 mg) by mouth 2 times daily    Hypertension goal BP (blood pressure) < 140/90       MULTI COMPLETE Caps     100 capsule    Take 1 capsule by mouth daily    Nutritional deficiency       polyethylene glycol powder    MIRALAX    510 g    Take 17 g (1 capful) by mouth daily    Constipation, unspecified constipation type       predniSONE 20 MG tablet    DELTASONE    20 tablet    Take 3 tabs (60  mg) by mouth daily x 3 days, 2 tabs (40 mg) daily x 3 days, 1 tab (20 mg) daily x 3 days, then 1/2 tab (10 mg) x 3 days.    Chronic cough       ranitidine 150 MG tablet    ZANTAC    180 tablet    Take 1 tablet (150 mg) by mouth 2 times daily    Gastroesophageal reflux disease, esophagitis presence not specified       * Notice:  This list has 2 medication(s) that are the same as other medications prescribed for you. Read the directions carefully, and ask your doctor or other care provider to review them with you.

## 2018-05-17 NOTE — TELEPHONE ENCOUNTER
Called patient/Kaycee Salamanca Sanjuana (Self) [phone] (M)     Left message on voicemail to return call to triage line at [phone].    Taylor Michaud RN [facility] Triage.

## 2018-05-18 ENCOUNTER — TELEPHONE (OUTPATIENT)
Dept: FAMILY MEDICINE | Facility: CLINIC | Age: 63
End: 2018-05-18

## 2018-05-18 NOTE — TELEPHONE ENCOUNTER
"I see Rx sent yesterday:   albuterol (PROAIR HFA/PROVENTIL HFA/VENTOLIN HFA) 108 (90 Base) MCG/ACT Inhaler    Can't they fill ventolin off generic \"albuterol\" request?    Attempted to call pharmacy to discuss, closed, call back later.    Kim Reardon RN  [facility]      "

## 2018-05-18 NOTE — TELEPHONE ENCOUNTER
Faxed message from pharmacy:  (albuterol (PROAIR HFA/PROVENTIL HFA/VENTOLIN HFA) 108 (90 Base) MCG/ACT Inhaler) Drug not covered by patient plan.  The preferred alternative is Ventolin HFA.  Please call/fax the pharmacy to change medication along with strength, directions, quantity, and refills.    Pharmacy pended.

## 2018-05-18 NOTE — TELEPHONE ENCOUNTER
Patient was seen by Drea Denise after the last call yesterday.  Closing encounter.  Kim Reardon RN  [facility]

## 2018-05-18 NOTE — TELEPHONE ENCOUNTER
I called [facility] to discuss; they already took care of this, was some sort of automatic computer thing.    May disregard request.    Kim Reardon RN  [facility]

## 2018-05-23 ENCOUNTER — OFFICE VISIT (OUTPATIENT)
Dept: FAMILY MEDICINE | Facility: CLINIC | Age: 63
End: 2018-05-23
Payer: COMMERCIAL

## 2018-05-23 VITALS
OXYGEN SATURATION: 96 % | DIASTOLIC BLOOD PRESSURE: 89 MMHG | HEART RATE: 78 BPM | TEMPERATURE: 97.6 F | BODY MASS INDEX: 30.18 KG/M2 | WEIGHT: 169 LBS | SYSTOLIC BLOOD PRESSURE: 140 MMHG

## 2018-05-23 DIAGNOSIS — I10 HYPERTENSION GOAL BP (BLOOD PRESSURE) < 140/90: ICD-10-CM

## 2018-05-23 DIAGNOSIS — J34.89 SINUS PRESSURE: Primary | ICD-10-CM

## 2018-05-23 DIAGNOSIS — R05.3 CHRONIC COUGH: ICD-10-CM

## 2018-05-23 PROCEDURE — 99213 OFFICE O/P EST LOW 20 MIN: CPT | Performed by: FAMILY MEDICINE

## 2018-05-23 RX ORDER — GUAIFENESIN/DEXTROMETHORPHAN 100-10MG/5
5 SYRUP ORAL EVERY 4 HOURS PRN
Qty: 560 ML | Refills: 0 | Status: SHIPPED | OUTPATIENT
Start: 2018-05-23 | End: 2018-06-29

## 2018-05-23 NOTE — TELEPHONE ENCOUNTER
"Requested Prescriptions   Pending Prescriptions Disp Refills     metoprolol tartrate (LOPRESSOR) 25 MG tablet [Pharmacy Med Name: METOPROLOL TARTRATE 25MG TABLETS] 180 tablet 0    Last Written Prescription Date:  11/10/17  Last Fill Quantity: 180,  # refills: 0   Last office visit: 5/23/2018 with prescribing provider:     Future Office Visit:     Sig: TAKE 1 TABLET(25 MG) BY MOUTH TWICE DAILY    Beta-Blockers Protocol Failed    5/23/2018  4:49 PM       Failed - Blood pressure under 140/90 in past 12 months    BP Readings from Last 3 Encounters:   05/23/18 140/89   05/17/18 136/82   05/07/18 155/89                Passed - Patient is age 6 or older       Passed - Recent (12 mo) or future (30 days) visit within the authorizing provider's specialty    Patient had office visit in the last 12 months or has a visit in the next 30 days with authorizing provider or within the authorizing provider's specialty.  See \"Patient Info\" tab in inbasket, or \"Choose Columns\" in Meds & Orders section of the refill encounter.              "

## 2018-05-23 NOTE — MR AVS SNAPSHOT
"              After Visit Summary   2018    Sanjuana Salamanca    MRN: [id number]           Patient Information     Date Of Birth          1955        Visit Information        Provider Department      2018 2:20 PM Sweetie Gao MD [facility]        Today's Diagnoses     Sinus pressure    -  1    Chronic cough           Follow-ups after your visit        Who to contact     If you have questions or need follow up information about today's clinic visit or your schedule please contact [facility] directly at [phone].  Normal or non-critical lab and imaging results will be communicated to you by [facility], letter or phone within 4 business days after the clinic has received the results. If you do not hear from us within 7 days, please contact the clinic through [facility] or phone. If you have a critical or abnormal lab result, we will notify you by phone as soon as possible.  Submit refill requests through [facility] or call your pharmacy and they will forward the refill request to us. Please allow 3 business days for your refill to be completed.          Additional Information About Your Visit        MyChart Information     [facility] lets you send messages to your doctor, view your test results, renew your prescriptions, schedule appointments and more. To sign up, go to [email or web address] . Click on \"Log in\" on the left side of the screen, which will take you to the Welcome page. Then click on \"Sign up Now\" on the right side of the page.     You will be asked to enter the access code listed below, as well as some personal information. Please follow the directions to create your username and password.     Your access code is: [email or web address]  Expires: 2018  4:11 PM     Your access code will  in 90 days. If you need help or a new code, please call your [facility] or [phone].        Care EveryWhere ID     This is your Care EveryWhere ID. " This could be used by other organizations to access your [place] medical records  [id number]        Your Vitals Were     Pulse Temperature Pulse Oximetry BMI (Body Mass Index)          78 97.6  F (36.4  C) (Oral) 96% 30.18 kg/m2         Blood Pressure from Last 3 Encounters:   05/23/18 140/89   05/17/18 136/82   05/07/18 155/89    Weight from Last 3 Encounters:   05/23/18 169 lb (76.7 kg)   05/17/18 170 lb 6.4 oz (77.3 kg)   05/07/18 174 lb (78.9 kg)              Today, you had the following     No orders found for display         Today's Medication Changes          These changes are accurate as of 5/23/18  3:07 PM.  If you have any questions, ask your nurse or doctor.               Start taking these medicines.        Dose/Directions    amoxicillin-clavulanate 875-125 MG per tablet   Commonly known as:  AUGMENTIN   Used for:  Sinus pressure, Chronic cough   Started by:  Sweetie Gao MD        Dose:  1 tablet   Take 1 tablet by mouth 2 times daily   Quantity:  20 tablet   Refills:  0       guaiFENesin-dextromethorphan 100-10 MG/5ML syrup   Commonly known as:  ROBITUSSIN DM   Used for:  Chronic cough   Started by:  Sweetie Gao MD        Dose:  5 mL   Take 5 mLs by mouth every 4 hours as needed for cough   Quantity:  560 mL   Refills:  0            Where to get your medicines      These medications were sent to [facility] [address] [place], [place] - [address] AT [facility] [address]  [address] [facility] [address]     Phone:  [phone]     amoxicillin-clavulanate 875-125 MG per tablet    guaiFENesin-dextromethorphan 100-10 MG/5ML syrup                Primary Care Provider Office Phone # Fax #    Sweetie Gao [phone] [address] [facility] [address]        Equal Access to Services     ED SINCLAIR AH: [place], victorino casper, qaybta kaalmatalisha shaw  coltenbronwynlalitha ley ah. So [facility] [phone].    ATENCIÓN: Si gaudencio andrade, tiene a ross disposición servicios gratuitos de asistencia lingüística. Jeremiah mendoza [phone].    We comply with applicable federal civil rights laws and [place] laws. We do not discriminate on the basis of race, color, national origin, age, disability, sex, sexual orientation, or gender identity.            Thank you!     Thank you for choosing [facility]  for your care. Our goal is always to provide you with excellent care. Hearing back from our patients is one way we can continue to improve our services. Please take a few minutes to complete the written survey that you may receive in the mail after your visit with us. Thank you!             Your Updated Medication List - Protect others around you: Learn how to safely use, store and throw away your medicines at www.disposemymeds.org.          This list is accurate as of 5/23/18  3:07 PM.  Always use your most recent med list.                   Brand Name Dispense Instructions for use Diagnosis    * MAPAP 500 MG tablet   Generic drug:  acetaminophen           * acetaminophen 325 MG tablet    TYLENOL    100 tablet    Take 1-2 tablets (325-650 mg) by mouth every 6 hours as needed for mild pain    Primary osteoarthritis of both knees       albuterol 108 (90 Base) MCG/ACT Inhaler    PROAIR HFA/PROVENTIL HFA/VENTOLIN HFA    1 Inhaler    Inhale 2 puffs into the lungs every 6 hours as needed for shortness of breath / dyspnea or wheezing    Chronic cough       amoxicillin-clavulanate 875-125 MG per tablet    AUGMENTIN    20 tablet    Take 1 tablet by mouth 2 times daily    Sinus pressure, Chronic cough       cholecalciferol 1000 UNIT tablet    vitamin D3    100 tablet    Take 1 tablet (1,000 Units) by mouth daily    Avitaminosis D       diclofenac 50 MG EC tablet    VOLTAREN    90 tablet    TAKE 1 TABLET(50 MG) BY MOUTH THREE TIMES DAILY AS NEEDED FOR MODERATE PAIN     Osteoarthritis of knee, unspecified laterality, unspecified osteoarthritis type       fluticasone 220 MCG/ACT Inhaler    FLOVENT HFA    3 Inhaler    Inhale 2 puffs into the lungs 2 times daily    Chronic cough       guaiFENesin-codeine 100-10 MG/5ML Soln solution    ROBITUSSIN AC    120 mL    Take 5 mLs by mouth every 4 hours as needed for cough    Chronic cough       guaiFENesin-dextromethorphan 100-10 MG/5ML syrup    ROBITUSSIN DM    560 mL    Take 5 mLs by mouth every 4 hours as needed for cough    Chronic cough       losartan 25 MG tablet    COZAAR    90 tablet    Take 1 tablet (25 mg) by mouth daily    Essential hypertension with goal blood pressure less than 140/90, Hypokalemia       metoprolol tartrate 25 MG tablet    LOPRESSOR    180 tablet    Take 1 tablet (25 mg) by mouth 2 times daily    Hypertension goal BP (blood pressure) < 140/90       MULTI COMPLETE Caps     100 capsule    Take 1 capsule by mouth daily    Nutritional deficiency       polyethylene glycol powder    MIRALAX    510 g    Take 17 g (1 capful) by mouth daily    Constipation, unspecified constipation type       predniSONE 20 MG tablet    DELTASONE    20 tablet    Take 3 tabs (60 mg) by mouth daily x 3 days, 2 tabs (40 mg) daily x 3 days, 1 tab (20 mg) daily x 3 days, then 1/2 tab (10 mg) x 3 days.    Chronic cough       ranitidine 150 MG tablet    ZANTAC    180 tablet    Take 1 tablet (150 mg) by mouth 2 times daily    Gastroesophageal reflux disease, esophagitis presence not specified       * Notice:  This list has 2 medication(s) that are the same as other medications prescribed for you. Read the directions carefully, and ask your doctor or other care provider to review them with you.

## 2018-05-23 NOTE — PROGRESS NOTES
SUBJECTIVE:   Sanjuana Salamanca is a 63 year old female who presents to clinic today for the following health issues:      Patient is here for her chronic cough, that is not getting any better.  Patient has not finished Prednisone due to fasting.    Amount of exercise or physical activity: None    Problems taking medications regularly: no    Medication side effects: none    Diet: regular (no restrictions)    Patient would also like to discuss TDAP and Menigicoccal injection she received on 5/7/18.    Problem list and histories reviewed & adjusted, as indicated.  Additional history: as documented    Patient Active Problem List   Diagnosis     Avitaminosis D     Esophageal reflux     OA (osteoarthritis) of knee     Advanced directives, counseling/discussion     Hyperlipidemia LDL goal <160     Cataracts, both eyes     Dry eyes     Hypokalemia     Prediabetes     Essential hypertension with goal blood pressure less than 140/90     History reviewed. No pertinent surgical history.    Social History   Substance Use Topics     Smoking status: Never Smoker     Smokeless tobacco: Never Used     Alcohol use No     Family History   Problem Relation Age of Onset     Hypertension Mother      Thyroid Disease Mother      DIABETES Other      CEREBROVASCULAR DISEASE Other      Glaucoma No family hx of      Macular Degeneration No family hx of      CANCER No family hx of          Current Outpatient Prescriptions   Medication Sig Dispense Refill     acetaminophen (TYLENOL) 325 MG tablet Take 1-2 tablets (325-650 mg) by mouth every 6 hours as needed for mild pain 100 tablet 3     albuterol (PROAIR HFA/PROVENTIL HFA/VENTOLIN HFA) 108 (90 Base) MCG/ACT Inhaler Inhale 2 puffs into the lungs every 6 hours as needed for shortness of breath / dyspnea or wheezing 1 Inhaler 1     amoxicillin-clavulanate (AUGMENTIN) 875-125 MG per tablet Take 1 tablet by mouth 2 times daily 20 tablet 0     cholecalciferol (VITAMIN D3) 1000 UNIT tablet Take 1  tablet (1,000 Units) by mouth daily 100 tablet 3     diclofenac (VOLTAREN) 50 MG EC tablet TAKE 1 TABLET(50 MG) BY MOUTH THREE TIMES DAILY AS NEEDED FOR MODERATE PAIN 90 tablet 0     fluticasone (FLOVENT HFA) 220 MCG/ACT Inhaler Inhale 2 puffs into the lungs 2 times daily 3 Inhaler 1     guaiFENesin-codeine (ROBITUSSIN AC) 100-10 MG/5ML SOLN solution Take 5 mLs by mouth every 4 hours as needed for cough 120 mL 0     guaiFENesin-dextromethorphan (ROBITUSSIN DM) 100-10 MG/5ML syrup Take 5 mLs by mouth every 4 hours as needed for cough 560 mL 0     losartan (COZAAR) 25 MG tablet Take 1 tablet (25 mg) by mouth daily 90 tablet 0     MAPAP 500 MG tablet        metoprolol (LOPRESSOR) 25 MG tablet Take 1 tablet (25 mg) by mouth 2 times daily 180 tablet 0     Multiple Vitamins-Minerals (MULTI COMPLETE) CAPS Take 1 capsule by mouth daily 100 capsule 3     polyethylene glycol (MIRALAX) powder Take 17 g (1 capful) by mouth daily 510 g 1     predniSONE (DELTASONE) 20 MG tablet Take 3 tabs (60 mg) by mouth daily x 3 days, 2 tabs (40 mg) daily x 3 days, 1 tab (20 mg) daily x 3 days, then 1/2 tab (10 mg) x 3 days. 20 tablet 0     ranitidine (ZANTAC) 150 MG tablet Take 1 tablet (150 mg) by mouth 2 times daily 180 tablet 1     Allergies   Allergen Reactions     Valsartan Cough     Recent Labs   Lab Test  03/30/18   1621  08/14/17   1620  02/06/17   1524   01/15/16   1220   07/29/15   1231   09/19/14   1533  04/14/14   1555   A1C  5.8   --   5.7   --   6.0   --   6.2*   < >   --    --    LDL   --    --    --    --    --    --   135*   --   138*  165*   HDL   --    --    --    --    --    --   32*   --   42*  34*   TRIG   --    --    --    --    --    --   220*   --   181*  137   ALT  29   --   30   --   32   --   28   < >   --    --    CR  0.54   --   0.66   --   0.57   < >  0.48*   --   0.61   --    GFRESTIMATED  >90   --   >90  Non  GFR Calc     --   >90  Non  GFR Calc     < >  >90  Non   American GFR Calc     --   >90  Non  GFR Calc     --    GFRESTBLACK  >90   --   >90   GFR Calc     --   >90   GFR Calc     < >  >90   GFR Calc     --   >90   GFR Calc     --    POTASSIUM  3.5  3.9  3.5   < >  3.7   < >  3.1*   --   3.7  3.6   TSH   --    --   1.35   --    --    --   1.72   --    --    --     < > = values in this interval not displayed.      BP Readings from Last 3 Encounters:   05/23/18 140/89   05/17/18 136/82   05/07/18 155/89    Wt Readings from Last 3 Encounters:   05/23/18 169 lb (76.7 kg)   05/17/18 170 lb 6.4 oz (77.3 kg)   05/07/18 174 lb (78.9 kg)                  Labs reviewed in EPIC    Reviewed and updated as needed this visit by clinical staff       Reviewed and updated as needed this visit by Provider         ROS:  Constitutional, HEENT, cardiovascular, pulmonary, gi and gu systems are negative, except as otherwise noted.    OBJECTIVE:     /89 (BP Location: Left arm, Patient Position: Sitting, Cuff Size: Adult Regular)  Pulse 78  Temp 97.6  F (36.4  C) (Oral)  Wt 169 lb (76.7 kg)  SpO2 96%  BMI 30.18 kg/m2  Body mass index is 30.18 kg/(m^2).  GENERAL: healthy, alert and no distress  NECK: no adenopathy, no asymmetry, masses, or scars and thyroid normal to palpation  RESP: lungs clear to auscultation - no rales, rhonchi or wheezes  CV: regular rate and rhythm, normal S1 S2, no S3 or S4, no murmur, click or rub, no peripheral edema and peripheral pulses strong  ABDOMEN: soft, nontender, no hepatosplenomegaly, no masses and bowel sounds normal  MS: no gross musculoskeletal defects noted, no edema    ASSESSMENT/PLAN:       ICD-10-CM    1. Sinus pressure J34.89 amoxicillin-clavulanate (AUGMENTIN) 875-125 MG per tablet   2. Chronic cough R05 amoxicillin-clavulanate (AUGMENTIN) 875-125 MG per tablet     guaiFENesin-dextromethorphan (ROBITUSSIN DM) 100-10 MG/5ML syrup     Prednisone prescription  directions discussed with pt. complete the course.   Sinus pressure on maxillary sinuses: Augmentin approved.   Strongly encouraged to get chest CT and CT sinuses done for chronic cough.       Sweetie Gao MD  [facility]

## 2018-05-24 RX ORDER — METOPROLOL TARTRATE 25 MG/1
TABLET, FILM COATED ORAL
Qty: 180 TABLET | Refills: 0 | Status: SHIPPED | OUTPATIENT
Start: 2018-05-24 | End: 2018-08-27

## 2018-05-24 NOTE — TELEPHONE ENCOUNTER
Routing refill request to provider for review/approval because:  Failed protocol.  Taylor Michaud RN [facility] Triage.

## 2018-05-28 ENCOUNTER — TELEPHONE (OUTPATIENT)
Dept: FAMILY MEDICINE | Facility: CLINIC | Age: 63
End: 2018-05-28

## 2018-05-28 NOTE — TELEPHONE ENCOUNTER
Reason for Call:  Other call back    Detailed comments: Patient wants to come back in for a cough follow up and go to over her CT scans with Dr. Gao. However, it looks like Dr. Gao does not have any appointments until August. Patient would like some info on where Dr. Cooper's whereabouts are or will be. Please advise. Thank you.    Phone Number Patient can be reached at:     Home Phone [phone]   Mobile [phone]       Best Time: Anytime.    Can we leave a detailed message on this number? Not Applicable    Call taken on 5/28/2018 at 3:23 PM by Jose Roque

## 2018-05-29 ENCOUNTER — RADIANT APPOINTMENT (OUTPATIENT)
Dept: CT IMAGING | Facility: CLINIC | Age: 63
End: 2018-05-29
Attending: FAMILY MEDICINE
Payer: COMMERCIAL

## 2018-05-29 ENCOUNTER — TELEPHONE (OUTPATIENT)
Dept: FAMILY MEDICINE | Facility: CLINIC | Age: 63
End: 2018-05-29

## 2018-05-29 DIAGNOSIS — R05.3 CHRONIC COUGH: ICD-10-CM

## 2018-05-29 PROCEDURE — 70486 CT MAXILLOFACIAL W/O DYE: CPT | Mod: TC

## 2018-05-29 PROCEDURE — 71250 CT THORAX DX C-: CPT | Mod: TC

## 2018-05-29 NOTE — TELEPHONE ENCOUNTER
Routing refill request to provider for review/approval because:  Drug not on the [facility] refill protocol   Taylor Michaud RN [facility] Triage.

## 2018-05-29 NOTE — TELEPHONE ENCOUNTER
Patient has CT's scheduled for today.  She wanted to make an appointment with PCP to go over results in person.  Advised that Dr. Gao has moved to [place].  Appointment made with Drea Denise PA-C as patient is requesting a women.  No other concerns at this time.    Patient stated understanding and agreeable with the plan of care. Taylor Michaud RN-BSN, [facility] Triage.

## 2018-05-29 NOTE — TELEPHONE ENCOUNTER
Faxed message from pharmacy:  Patient would like a new medication spacer sent into pharmacy.    Pharmacy pended.

## 2018-05-29 NOTE — TELEPHONE ENCOUNTER
Called patient at [phone] (home). Left message on voicemail to return phone call to triage line at [phone].  Taylor Michaud RN [facility] Triage.

## 2018-05-31 ENCOUNTER — OFFICE VISIT (OUTPATIENT)
Dept: FAMILY MEDICINE | Facility: CLINIC | Age: 63
End: 2018-05-31
Payer: COMMERCIAL

## 2018-05-31 VITALS
HEART RATE: 73 BPM | SYSTOLIC BLOOD PRESSURE: 115 MMHG | DIASTOLIC BLOOD PRESSURE: 81 MMHG | WEIGHT: 169 LBS | TEMPERATURE: 97.8 F | BODY MASS INDEX: 30.18 KG/M2

## 2018-05-31 DIAGNOSIS — R05.3 CHRONIC COUGH: Primary | ICD-10-CM

## 2018-05-31 DIAGNOSIS — K59.00 CONSTIPATION, UNSPECIFIED CONSTIPATION TYPE: ICD-10-CM

## 2018-05-31 DIAGNOSIS — M17.9 OSTEOARTHRITIS OF KNEE, UNSPECIFIED LATERALITY, UNSPECIFIED OSTEOARTHRITIS TYPE: ICD-10-CM

## 2018-05-31 PROCEDURE — 99214 OFFICE O/P EST MOD 30 MIN: CPT | Performed by: PHYSICIAN ASSISTANT

## 2018-05-31 NOTE — PROGRESS NOTES
SUBJECTIVE:   Sanjuana Salamanca is a 63 year old female who presents to clinic today for the following health issues:      Pt is here to go over CT scan results.     Patient was seen by me on 5/17/18 for her chronic cough and prescribed prednisone.   She was then seen by Dr. Gao (whom is no longer with the clinic) on 5/23/18. At this visit she noted she had not taken all the prednisone because of Ramadan and fasting. Dr. Gao prescribed Augmentin as well and encouraged her CT scan. She also extended the prednisone.     Patient is here today with the same chronic cough. She has not finished the Augmentin because of Ramadan and she notes she has at least 3-7 tablets of prednisone left. She is unsure if it is more. She does not like taking all these medications.   She did complete her sinus CT and her chest CT. Both of which were normal.   She continues with a cough. She also complains of constipation. Had been prescribed miralax in the past but does not use regularly and does not find it overly helpful. Can go many days without a BM. She also complains of bloating and stomach pain. She is taking the zantac but does not find that it is helping.     She is leaving soon out of [place] for a [place]ast[place] pilgrimage.     Problem list and histories reviewed & adjusted, as indicated.  Additional history: as documented    Patient Active Problem List   Diagnosis     Avitaminosis D     Esophageal reflux     OA (osteoarthritis) of knee     Advanced directives, counseling/discussion     Hyperlipidemia LDL goal <160     Cataracts, both eyes     Dry eyes     Hypokalemia     Prediabetes     Essential hypertension with goal blood pressure less than 140/90     History reviewed. No pertinent surgical history.    Social History   Substance Use Topics     Smoking status: Never Smoker     Smokeless tobacco: Never Used     Alcohol use No     Family History   Problem Relation Age of Onset     Hypertension Mother      Thyroid Disease  Mother      DIABETES Other      CEREBROVASCULAR DISEASE Other      Glaucoma No family hx of      Macular Degeneration No family hx of      CANCER No family hx of            Reviewed and updated as needed this visit by clinical staff  Tobacco  Allergies  Meds  Med Hx  Surg Hx  Fam Hx  Soc Hx      Reviewed and updated as needed this visit by Provider         ROS:  Constitutional, HEENT, cardiovascular, pulmonary, gi and gu systems are negative, except as otherwise noted.    OBJECTIVE:     /81 (BP Location: Left arm, Patient Position: Chair, Cuff Size: Adult Large)  Pulse 73  Temp 97.8  F (36.6  C) (Oral)  Wt 169 lb (76.7 kg)  Breastfeeding? No  BMI 30.18 kg/m2  Body mass index is 30.18 kg/(m^2).  GENERAL: healthy, alert and no distress  RESP: lungs clear to auscultation - no rales, rhonchi or wheezes- hoarse voice, dry cough.   CV: regular rate and rhythm, normal S1 S2, no S3 or S4, no murmur, click or rub,   ABDOMEN: soft, nontender, no hepatosplenomegaly, no masses and bowel sounds normal      ASSESSMENT/PLAN:   1. Chronic cough  Patient has now seen ENT for a laryngoscope, she has had CT scans and a work up by allergy without success in resolving the cough. She has not yet tried a PPI so her zantac was changed to omperazole as suggested by allergy. She will follow up after her pilgrimage. Stop augmentin and finish steroids.   - omeprazole (PRILOSEC) 20 MG CR capsule; Take 1 capsule (20 mg) by mouth daily  Dispense: 30 capsule; Refill: 1  - order for DME; Equipment being ordered: spacer for use with HFA inhalers.  Dispense: 1 each; Refill: 0    2. Constipation  We discussed the miralax works best if taken daily. She is resistant to a daily medication. Also suggested increasing her fiber and water.     FUTURE APPOINTMENTS:       - Follow-up visit in about 1 month.     Drea Denise PA-C  [facility]

## 2018-05-31 NOTE — MR AVS SNAPSHOT
After Visit Summary   5/31/2018    Sanjuana Salamanca    MRN: [id number]           Patient Information     Date Of Birth          1955        Visit Information        Provider Department      5/31/2018 2:20 PM Drea Denise PA-C [facility]        Today's Diagnoses     Chronic cough    -  1      Care Instructions    1 Finish the prednisone.  Stop the Augmentin because there is no signs of infection.     Stop the ranitidine and change to omeprazole.     When you are back from your pilgrimage return to clinic     You can try fiber with your diet. Metamucil or Citrucel.           Follow-ups after your visit        Who to contact     If you have questions or need follow up information about today's clinic visit or your schedule please contact [facility] directly at [phone].  Normal or non-critical lab and imaging results will be communicated to you by MyChart, letter or phone within 4 business days after the clinic has received the results. If you do not hear from us within 7 days, please contact the clinic through MyChart or phone. If you have a critical or abnormal lab result, we will notify you by phone as soon as possible.  Submit refill requests through [employer] or call your pharmacy and they will forward the refill request to us. Please allow 3 business days for your refill to be completed.          Additional Information About Your Visit        Care EveryWhere ID     This is your Care EveryWhere ID. This could be used by other organizations to access your [facility] medical records  [id number]        Your Vitals Were     Pulse Temperature Breastfeeding? BMI (Body Mass Index)          73 97.8  F (36.6  C) (Oral) No 30.18 kg/m2         Blood Pressure from Last 3 Encounters:   05/31/18 115/81   05/23/18 140/89   05/17/18 136/82    Weight from Last 3 Encounters:   05/31/18 169 lb (76.7 kg)   05/23/18 169 lb (76.7 kg)   05/17/18 170 lb 6.4 oz (77.3 kg)               Today, you had the following     No orders found for display         Today's Medication Changes          These changes are accurate as of 5/31/18  2:41 PM.  If you have any questions, ask your nurse or doctor.               Start taking these medicines.        Dose/Directions    omeprazole 20 MG CR capsule   Commonly known as:  priLOSEC   Used for:  Chronic cough   Started by:  Drea Denise PA-C        Dose:  20 mg   Take 1 capsule (20 mg) by mouth daily   Quantity:  30 capsule   Refills:  1       order for DME   Used for:  Chronic cough   Started by:  Drea Denise PA-C        Equipment being ordered: spacer for use with HFA inhalers.   Quantity:  1 each   Refills:  0            Where to get your medicines      These medications were sent to [facility] [id number] [facility] [address] [place], [place] [address]E [address] AT [facility] [address] [facility]  [address] [facility] [address]     Phone:  [phone]     omeprazole 20 MG CR capsule         Some of these will need a paper prescription and others can be bought over the counter.  Ask your nurse if you have questions.     Bring a paper prescription for each of these medications     order for DME                Primary Care Provider    None Specified       No primary provider on file.        Equal Access to Services     ROSA SINCLAIR : Hadii sravanthi Lea, waraghav casper, qaappleta kaalmada ana maria, talisha ley . So [facility] [phone].    ATENCIÓN: Si habla español, tiene a ross disposición servicios gratuitos de asistencia lingüística. Llame al [phone].    We comply with applicable federal civil rights laws and [place] laws. We do not discriminate on the basis of race, color, national origin, age, disability, sex, sexual orientation, or gender identity.            Thank you!     Thank you for choosing [facility]  for your care. Our goal is always to provide you with excellent care.  Hearing back from our patients is one way we can continue to improve our services. Please take a few minutes to complete the written survey that you may receive in the mail after your visit with us. Thank you!             Your Updated Medication List - Protect others around you: Learn how to safely use, store and throw away your medicines at www.disposemymeds.org.          This list is accurate as of 5/31/18  2:41 PM.  Always use your most recent med list.                   Brand Name Dispense Instructions for use Diagnosis    * MAPAP 500 MG tablet   Generic drug:  acetaminophen           * acetaminophen 325 MG tablet    TYLENOL    100 tablet    Take 1-2 tablets (325-650 mg) by mouth every 6 hours as needed for mild pain    Primary osteoarthritis of both knees       albuterol 108 (90 Base) MCG/ACT Inhaler    PROAIR HFA/PROVENTIL HFA/VENTOLIN HFA    1 Inhaler    Inhale 2 puffs into the lungs every 6 hours as needed for shortness of breath / dyspnea or wheezing    Chronic cough       cholecalciferol 1000 UNIT tablet    vitamin D3    100 tablet    Take 1 tablet (1,000 Units) by mouth daily    Avitaminosis D       diclofenac 50 MG EC tablet    VOLTAREN    90 tablet    TAKE 1 TABLET(50 MG) BY MOUTH THREE TIMES DAILY AS NEEDED FOR MODERATE PAIN    Osteoarthritis of knee, unspecified laterality, unspecified osteoarthritis type       fluticasone 220 MCG/ACT Inhaler    FLOVENT HFA    3 Inhaler    Inhale 2 puffs into the lungs 2 times daily    Chronic cough       guaiFENesin-dextromethorphan 100-10 MG/5ML syrup    ROBITUSSIN DM    560 mL    Take 5 mLs by mouth every 4 hours as needed for cough    Chronic cough       losartan 25 MG tablet    COZAAR    90 tablet    Take 1 tablet (25 mg) by mouth daily    Essential hypertension with goal blood pressure less than 140/90, Hypokalemia       metoprolol tartrate 25 MG tablet    LOPRESSOR    180 tablet    TAKE 1 TABLET(25 MG) BY MOUTH TWICE DAILY    Hypertension goal BP (blood  pressure) < 140/90       MULTI COMPLETE Caps     100 capsule    Take 1 capsule by mouth daily    Nutritional deficiency       omeprazole 20 MG CR capsule    priLOSEC    30 capsule    Take 1 capsule (20 mg) by mouth daily    Chronic cough       order for DME     1 each    Equipment being ordered: spacer for use with HFA inhalers.    Chronic cough       polyethylene glycol powder    MIRALAX    510 g    Take 17 g (1 capful) by mouth daily    Constipation, unspecified constipation type       predniSONE 20 MG tablet    DELTASONE    20 tablet    Take 3 tabs (60 mg) by mouth daily x 3 days, 2 tabs (40 mg) daily x 3 days, 1 tab (20 mg) daily x 3 days, then 1/2 tab (10 mg) x 3 days.    Chronic cough       * Notice:  This list has 2 medication(s) that are the same as other medications prescribed for you. Read the directions carefully, and ask your doctor or other care provider to review them with you.

## 2018-05-31 NOTE — PATIENT INSTRUCTIONS
1 Finish the prednisone.  Stop the Augmentin because there is no signs of infection.     Stop the ranitidine and change to omeprazole.     When you are back from your pilgrimage return to clinic     You can try fiber with your diet. Metamucil or Citrucel.

## 2018-06-01 NOTE — TELEPHONE ENCOUNTER
Prescription approved per [facility] Refill Protocol.  Taylor Michaud, RN [facility] Triage.

## 2018-06-01 NOTE — TELEPHONE ENCOUNTER
"Requested Prescriptions   Pending Prescriptions Disp Refills     diclofenac (VOLTAREN) 50 MG EC tablet [Pharmacy Med Name: DICLOFENAC SODIUM 50MG DR TABLETS] 90 tablet 0    Last Written Prescription Date:  11-10-17  Last Fill Quantity: 90,  # refills: 0   Last office visit: 5/31/2018 with prescribing provider:     Future Office Visit:     Sig: TAKE 1 TABLET(50 MG) BY MOUTH THREE TIMES DAILY AS NEEDED FOR MODERATE PAIN    NSAID Medications Passed    5/31/2018  3:08 PM       Passed - Blood pressure under 140/90 in past 12 months    BP Readings from Last 3 Encounters:   05/31/18 115/81   05/23/18 140/89   05/17/18 136/82                Passed - Normal ALT on file in past 12 months    Recent Labs   Lab Test  03/30/18   1621   ALT  29            Passed - Normal AST on file in past 12 months    Recent Labs   Lab Test  03/30/18   1621   AST  26            Passed - Recent (12 mo) or future (30 days) visit within the authorizing provider's specialty    Patient had office visit in the last 12 months or has a visit in the next 30 days with authorizing provider or within the authorizing provider's specialty.  See \"Patient Info\" tab in inbasket, or \"Choose Columns\" in Meds & Orders section of the refill encounter.           Passed - Patient is age 6-64 years       Passed - Normal CBC on file in past 12 months    Recent Labs   Lab Test  09/08/17   1425   WBC  9.6   RBC  4.85   HGB  13.8   HCT  41.9   PLT  361       For [facility] ONLY: JVBM200 = WBC, DESQ212 = RBC         Passed - No active pregnancy on record       Passed - Normal serum creatinine on file in past 12 months    Recent Labs   Lab Test  03/30/18   1621   CR  0.54            Passed - No positive pregnancy test in past 12 months          "

## 2018-06-04 ENCOUNTER — TELEPHONE (OUTPATIENT)
Dept: FAMILY MEDICINE | Facility: CLINIC | Age: 63
End: 2018-06-04

## 2018-06-04 NOTE — TELEPHONE ENCOUNTER
Team please do PA on her spacer for asthma.   Needed for proper inhaler usage.   Drea Denise PA-C

## 2018-06-04 NOTE — TELEPHONE ENCOUNTER
Prior Authorization Retail Medication Request    Medication/Dose: Prochamber aero spacer  ICD code (if different than what is on RX):    Previously Tried and Failed:    Rationale:      Insurance Name:  [employer]  Insurance ID:  [id number]      Pharmacy Information (if different than what is on RX)  Name:  [facility]  Phone:  [phone]    TIEN Beverly MA

## 2018-06-04 NOTE — TELEPHONE ENCOUNTER
The [facility] PA team does not initiate PA's for DME equipment. Please refer back to the clinic staff for further assistance.

## 2018-06-29 ENCOUNTER — TELEPHONE (OUTPATIENT)
Dept: FAMILY MEDICINE | Facility: CLINIC | Age: 63
End: 2018-06-29

## 2018-06-29 ENCOUNTER — OFFICE VISIT (OUTPATIENT)
Dept: FAMILY MEDICINE | Facility: CLINIC | Age: 63
End: 2018-06-29
Payer: COMMERCIAL

## 2018-06-29 VITALS
TEMPERATURE: 97.9 F | WEIGHT: 170 LBS | SYSTOLIC BLOOD PRESSURE: 136 MMHG | DIASTOLIC BLOOD PRESSURE: 84 MMHG | HEART RATE: 85 BPM | BODY MASS INDEX: 30.35 KG/M2 | OXYGEN SATURATION: 98 %

## 2018-06-29 DIAGNOSIS — R05.8 RECURRENT NONPRODUCTIVE COUGH: Primary | ICD-10-CM

## 2018-06-29 PROCEDURE — 99213 OFFICE O/P EST LOW 20 MIN: CPT | Performed by: NURSE PRACTITIONER

## 2018-06-29 RX ORDER — BENZONATATE 200 MG/1
200 CAPSULE ORAL 3 TIMES DAILY PRN
Qty: 21 CAPSULE | Refills: 1 | Status: SHIPPED | OUTPATIENT
Start: 2018-06-29 | End: 2018-09-10

## 2018-06-29 ASSESSMENT — PAIN SCALES - GENERAL: PAINLEVEL: NO PAIN (0)

## 2018-06-29 NOTE — TELEPHONE ENCOUNTER
Prior Authorization Retail Medication Request    Medication/Dose: Tessalon 200mg  ICD code (if different than what is on RX):  R05  Previously Tried and Failed:  Robitussin-AC  Rationale:  PCP unable to diagnose source of cough and does not seem confident Tessalon will help    Insurance Name:  [facility]   Insurance ID:  [id number]       Pharmacy Information (if different than what is on RX)  Name:  [facility]  Phone:  [phone]    Artie Colbert, PharmD  [facility]   [facility] Pharmacist on behalf of [facility]

## 2018-06-29 NOTE — PROGRESS NOTES
SUBJECTIVE:   Sanjuana Salamanca is a 63 year old female who presents to clinic today for the following health issues:      Acute Illness   Acute illness concerns: Cough  Onset: 4 weeks    Fever: no    Chills/Sweats: YES- sweats    Headache (location?): no    Sinus Pressure:no    Conjunctivitis:  no    Ear Pain: no    Rhinorrhea: no    Congestion:  Yes, chest    Sore Throat: no     Cough: YES-non-productive    Wheeze: YES    Decreased Appetite: no    Nausea: no    Vomiting: no    Diarrhea:  no    Dysuria/Freq.: no    Fatigue/Achiness: no    Sick/Strep Exposure: no     Therapies Tried and outcome:     Please see previous encounters for historical documentation of her cough  She has been treated with multiple rounds of antibiotics and steroid bursts without improvement  She had normal chest and sinus CT 5/29/18  At last appointment 5/31/18 she was started on PPI without improvement in cough  She has been evaluated by Dr. Guerrero, allergy    She recently returned from [facility]  No improvement while away  Some wheezing when laying on right side, improved with APRIL  Denies SOB  Cough is dry  She is using Flovent   Nothing improves cough      Problem list and histories reviewed & adjusted, as indicated.  Additional history: none    Patient Active Problem List   Diagnosis     Avitaminosis D     Esophageal reflux     OA (osteoarthritis) of knee     Advanced directives, counseling/discussion     Hyperlipidemia LDL goal <160     Cataracts, both eyes     Dry eyes     Hypokalemia     Prediabetes     Essential hypertension with goal blood pressure less than 140/90     History reviewed. No pertinent surgical history.    Social History   Substance Use Topics     Smoking status: Never Smoker     Smokeless tobacco: Never Used     Alcohol use No     Family History   Problem Relation Age of Onset     Hypertension Mother      Thyroid Disease Mother      Diabetes Other      Cerebrovascular Disease Other      Glaucoma No family hx of       Macular Degeneration No family hx of      Cancer No family hx of          Current Outpatient Prescriptions   Medication Sig Dispense Refill     acetaminophen (TYLENOL) 325 MG tablet Take 1-2 tablets (325-650 mg) by mouth every 6 hours as needed for mild pain 100 tablet 3     albuterol (PROAIR HFA/PROVENTIL HFA/VENTOLIN HFA) 108 (90 Base) MCG/ACT Inhaler Inhale 2 puffs into the lungs every 6 hours as needed for shortness of breath / dyspnea or wheezing 1 Inhaler 1     benzonatate (TESSALON) 200 MG capsule Take 1 capsule (200 mg) by mouth 3 times daily as needed for cough 21 capsule 1     cholecalciferol (VITAMIN D3) 1000 UNIT tablet Take 1 tablet (1,000 Units) by mouth daily 100 tablet 3     diclofenac (VOLTAREN) 50 MG EC tablet TAKE 1 TABLET(50 MG) BY MOUTH THREE TIMES DAILY AS NEEDED FOR MODERATE PAIN 90 tablet 0     fluticasone (FLOVENT HFA) 220 MCG/ACT Inhaler Inhale 2 puffs into the lungs 2 times daily 3 Inhaler 1     losartan (COZAAR) 25 MG tablet Take 1 tablet (25 mg) by mouth daily 90 tablet 0     MAPAP 500 MG tablet        metoprolol tartrate (LOPRESSOR) 25 MG tablet TAKE 1 TABLET(25 MG) BY MOUTH TWICE DAILY 180 tablet 0     Multiple Vitamins-Minerals (MULTI COMPLETE) CAPS Take 1 capsule by mouth daily 100 capsule 3     omeprazole (PRILOSEC) 20 MG CR capsule Take 1 capsule (20 mg) by mouth daily 30 capsule 1     order for DME Equipment being ordered: spacer for use with HFA inhalers. 1 each 0     polyethylene glycol (MIRALAX) powder Take 17 g (1 capful) by mouth daily 510 g 1     BP Readings from Last 3 Encounters:   06/29/18 136/84   05/31/18 115/81   05/23/18 140/89    Wt Readings from Last 3 Encounters:   06/29/18 170 lb (77.1 kg)   05/31/18 169 lb (76.7 kg)   05/23/18 169 lb (76.7 kg)                    Reviewed and updated as needed this visit by clinical staff  Tobacco  Allergies  Meds  Med Hx  Surg Hx  Fam Hx  Soc Hx      Reviewed and updated as needed this visit by Provider          ROS:  Constitutional, HEENT, cardiovascular, pulmonary, gi and gu systems are negative, except as otherwise noted.    OBJECTIVE:     /84  Pulse 85  Temp 97.9  F (36.6  C) (Oral)  Wt 170 lb (77.1 kg)  SpO2 98%  Breastfeeding? No  BMI 30.35 kg/m2  Body mass index is 30.35 kg/(m^2).  GENERAL: healthy, alert and no distress  HENT: ear canals and TM's normal, nose and mouth without ulcers or lesions  NECK: no adenopathy, no asymmetry, masses, or scars and thyroid normal to palpation  RESP: Scattered coarse lung sounds, moist wheezy cough  CV: regular rate and rhythm, normal S1 S2, no S3 or S4, no murmur, click or rub, no peripheral edema and peripheral pulses strong    Diagnostic Test Results:  none     ASSESSMENT/PLAN:       ICD-10-CM    1. Recurrent nonproductive cough R05 PULMONARY MEDICINE REFERRAL     benzonatate (TESSALON) 200 MG capsule       Dr. Guerrero had advised follow up in 3 months. She declines  I recommend pulmonary consult. She has had significant workup which has been unremarkable. No improvement despite steroid burst, Abx, PPI. She is resistant to any further workup. She wants antibiotic prescribed for 30 days. States that in [place], they commonly treat cough for 1 month. I declined. I explained my reasoning.   She asks for something for cough. I agreed to prescribed Tessalon though I advised it will not cure cough and ultimately we need to identify source. She asks for cough syrup at bedtime which I declined. I explained that I will not be refilling medication for cough and that ultimately she needs to see pulmonologist.    We discussed screening measures which she declines    ANTHONY Velasco [facility]

## 2018-06-29 NOTE — TELEPHONE ENCOUNTER
I will not proceed with PA. I am not convinced it will help. Patient can pay out of pocket if she insists on trying. Ultimately, she needs to see pulmonary

## 2018-06-29 NOTE — TELEPHONE ENCOUNTER
"Drug on the \"not-covered\" list of excluded drugs through [facility].  Told daughter.  Artie Colbert, PharmD  [facility]   [employer] Pharmacist on behalf of [facility]    "

## 2018-06-29 NOTE — TELEPHONE ENCOUNTER
Will also route to prescriber to see if she wants to change med or make other arrangements given her skepticism of the effectiveness of Tessalon.    Artie Colbert, PharmD  [facility]   [employer] Pharmacist on behalf of [facility]

## 2018-06-29 NOTE — MR AVS SNAPSHOT
After Visit Summary   6/29/2018    Sanjuana Salamanca    MRN: [id number]           Patient Information     Date Of Birth          1955        Visit Information        Provider Department      6/29/2018 2:00 PM Nataliia Clayton APRN CNP [facility]        Today's Diagnoses     Recurrent nonproductive cough    -  1       Follow-ups after your visit        Additional Services     PULMONARY MEDICINE REFERRAL       Your provider [facility] [phone]   [email or web address]    Please be aware that coverage of these services is subject to the terms and limitations of your health insurance plan.  Call member services at your health plan with any benefit or coverage questions.      Please bring the following with you to your appointment:    (1) Any X-Rays, CTs or MRIs which have been performed.  Contact the facility where they were done to arrange for  prior to your scheduled appointment.    (2) List of current medications   (3) This referral request   (4) Any documents/labs given to you for this referral                  Who to contact     If you have questions or need follow up information about today's clinic visit or your schedule please contact [facility] directly at [phone].  Normal or non-critical lab and imaging results will be communicated to you by MyChart, letter or phone within 4 business days after the clinic has received the results. If you do not hear from us within 7 days, please contact the clinic through MyChart or phone. If you have a critical or abnormal lab result, we will notify you by phone as soon as possible.  Submit refill requests through [employer] or call your pharmacy and they will forward the refill request to us. Please allow 3 business days for your refill to be completed.          Additional Information  About Your Visit        Care EveryWhere ID     This is your Care EveryWhere ID. This could be used by other organizations to access your [place] medical records  [id number]        Your Vitals Were     Pulse Temperature Pulse Oximetry Breastfeeding? BMI (Body Mass Index)       85 97.9  F (36.6  C) (Oral) 98% No 30.35 kg/m2        Blood Pressure from Last 3 Encounters:   06/29/18 136/84   05/31/18 115/81   05/23/18 140/89    Weight from Last 3 Encounters:   06/29/18 170 lb (77.1 kg)   05/31/18 169 lb (76.7 kg)   05/23/18 169 lb (76.7 kg)              We Performed the Following     PULMONARY MEDICINE REFERRAL          Today's Medication Changes          These changes are accurate as of 6/29/18  2:40 PM.  If you have any questions, ask your nurse or doctor.               Start taking these medicines.        Dose/Directions    benzonatate 200 MG capsule   Commonly known as:  TESSALON   Used for:  Recurrent nonproductive cough   Started by:  Nataliia Clayton APRN CNP        Dose:  200 mg   Take 1 capsule (200 mg) by mouth 3 times daily as needed for cough   Quantity:  21 capsule   Refills:  1            Where to get your medicines      These medications were sent to [facility] [place], [place] - [address]  [address], [facility] [address]     Phone:  [phone]     benzonatate 200 MG capsule                Primary Care Provider    None Specified       No primary provider on file.        Equal Access to Services     ROSA SINCLAIR AH: Hadii sravanthi yen Soarchana, waaxda luqadaha, qaybta kaalmada adeshai, talisha feliz. So [facility] [phone].    ATENCIÓN: Si habla español, tiene a ross disposición servicios gratuitos de asistencia lingüística. Javoname al [phone].    We comply with applicable federal civil rights laws and [place] laws. We do not discriminate on the basis of race, color, national origin, age, disability, sex,  sexual orientation, or gender identity.            Thank you!     Thank you for choosing [facility]  for your care. Our goal is always to provide you with excellent care. Hearing back from our patients is one way we can continue to improve our services. Please take a few minutes to complete the written survey that you may receive in the mail after your visit with us. Thank you!             Your Updated Medication List - Protect others around you: Learn how to safely use, store and throw away your medicines at www.disposemymeds.org.          This list is accurate as of 6/29/18  2:40 PM.  Always use your most recent med list.                   Brand Name Dispense Instructions for use Diagnosis    * MAPAP 500 MG tablet   Generic drug:  acetaminophen           * acetaminophen 325 MG tablet    TYLENOL    100 tablet    Take 1-2 tablets (325-650 mg) by mouth every 6 hours as needed for mild pain    Primary osteoarthritis of both knees       albuterol 108 (90 Base) MCG/ACT Inhaler    PROAIR HFA/PROVENTIL HFA/VENTOLIN HFA    1 Inhaler    Inhale 2 puffs into the lungs every 6 hours as needed for shortness of breath / dyspnea or wheezing    Chronic cough       benzonatate 200 MG capsule    TESSALON    21 capsule    Take 1 capsule (200 mg) by mouth 3 times daily as needed for cough    Recurrent nonproductive cough       cholecalciferol 1000 UNIT tablet    vitamin D3    100 tablet    Take 1 tablet (1,000 Units) by mouth daily    Avitaminosis D       diclofenac 50 MG EC tablet    VOLTAREN    90 tablet    TAKE 1 TABLET(50 MG) BY MOUTH THREE TIMES DAILY AS NEEDED FOR MODERATE PAIN    Osteoarthritis of knee, unspecified laterality, unspecified osteoarthritis type       fluticasone 220 MCG/ACT Inhaler    FLOVENT HFA    3 Inhaler    Inhale 2 puffs into the lungs 2 times daily    Chronic cough       losartan 25 MG tablet    COZAAR    90 tablet    Take 1 tablet (25 mg) by mouth daily    Essential hypertension with  goal blood pressure less than 140/90, Hypokalemia       metoprolol tartrate 25 MG tablet    LOPRESSOR    180 tablet    TAKE 1 TABLET(25 MG) BY MOUTH TWICE DAILY    Hypertension goal BP (blood pressure) < 140/90       MULTI COMPLETE Caps     100 capsule    Take 1 capsule by mouth daily    Nutritional deficiency       omeprazole 20 MG CR capsule    priLOSEC    30 capsule    Take 1 capsule (20 mg) by mouth daily    Chronic cough       order for DME     1 each    Equipment being ordered: spacer for use with HFA inhalers.    Chronic cough       polyethylene glycol powder    MIRALAX    510 g    Take 17 g (1 capful) by mouth daily    Constipation, unspecified constipation type       * Notice:  This list has 2 medication(s) that are the same as other medications prescribed for you. Read the directions carefully, and ask your doctor or other care provider to review them with you.

## 2018-07-02 DIAGNOSIS — R05.9 COUGH: Primary | ICD-10-CM

## 2018-07-05 DIAGNOSIS — R05.9 COUGH: Primary | ICD-10-CM

## 2018-07-05 RX ORDER — GUAIFENESIN/DEXTROMETHORPHAN 100-10MG/5
5 SYRUP ORAL EVERY 4 HOURS PRN
Qty: 240 ML | Refills: 0 | Status: SHIPPED | OUTPATIENT
Start: 2018-07-05 | End: 2018-08-27

## 2018-07-07 ENCOUNTER — OFFICE VISIT (OUTPATIENT)
Dept: URGENT CARE | Facility: URGENT CARE | Age: 63
End: 2018-07-07
Payer: COMMERCIAL

## 2018-07-07 VITALS
OXYGEN SATURATION: 100 % | WEIGHT: 170 LBS | HEART RATE: 89 BPM | SYSTOLIC BLOOD PRESSURE: 159 MMHG | DIASTOLIC BLOOD PRESSURE: 85 MMHG | TEMPERATURE: 97.7 F | BODY MASS INDEX: 30.35 KG/M2

## 2018-07-07 DIAGNOSIS — R05.3 CHRONIC COUGH: Primary | ICD-10-CM

## 2018-07-07 PROCEDURE — 99214 OFFICE O/P EST MOD 30 MIN: CPT | Performed by: PHYSICIAN ASSISTANT

## 2018-07-07 RX ORDER — BENZONATATE 200 MG/1
200 CAPSULE ORAL 3 TIMES DAILY PRN
Qty: 30 CAPSULE | Refills: 0 | Status: SHIPPED | OUTPATIENT
Start: 2018-07-07 | End: 2018-08-27

## 2018-07-07 NOTE — Clinical Note
Dr. Gao,  I saw your patient Sanjuana Salamanca at [facility] on Friday night for chronic non-productive cough which was persistent throughout her encounter with me.  Of note, her allergy list states Valsartan causes cough.  I noted she is currently taking Losartan.  I instructed her to d/c the Losartan to see if cough improves and contact you regarding other options as it appears you are managing her HTN.   It appears she has been referred to Pulmonology but she states she was unable to get an appt until December.  If so, perhaps the FP clinic staff can facilitate a more timely follow up. Thank you, Brittney Hawkins PA-C

## 2018-07-07 NOTE — MR AVS SNAPSHOT
After Visit Summary   7/7/2018    Sanjuana Salamanca    MRN: [id number]           Patient Information     Date Of Birth          1955        Visit Information        Provider Department      7/7/2018 4:20 PM Brittney Hawkins PA-C [facility]        Today's Diagnoses     Chronic cough    -  1       Follow-ups after your visit        Your next 10 appointments already scheduled     Sep 12, 2018 11:30 AM CDT   FULL PULMONARY FUNCTION with UC PFL A   [facility] Pulmonary Function Testing ([facility])    [address]  3rd Floor  [facility] [id number] [phone]            Sep 12, 2018 12:30 PM CDT   XR CHEST 2 VIEWS with UCXR1   [facility] Xray ([facility])    [address] [place] [id number] [phone]           Please bring a list of your current medicines to your exam. (Include vitamins, minerals and over-thecounter medicines.) Leave your valuables at home.  Tell your doctor if there is a chance you may be pregnant.  You do not need to do anything special for this exam.            Sep 12, 2018 12:55 PM CDT   (Arrive by 12:40 PM)   New Patient Visit with Nathalia Angel MD   [facility] ([facility])    [address] [place] [id number] [phone]              Who to contact     If you have questions or need follow up information about today's clinic visit or your schedule please contact [facility] directly at [phone].  Normal or non-critical lab and imaging results will be communicated to you by MyChart, letter or phone within 4 business days after the clinic has received the results. If you do not hear from us within 7 days, please contact the clinic through MyChart or phone. If you have a critical or abnormal lab result, we will notify you by phone as  soon as possible.  Submit refill requests through [facility] or call your pharmacy and they will forward the refill request to us. Please allow 3 business days for your refill to be completed.          Additional Information About Your Visit        Care EveryWhere ID     This is your Care EveryWhere ID. This could be used by other organizations to access your [place] medical records  [id number]        Your Vitals Were     Pulse Temperature Pulse Oximetry BMI (Body Mass Index)          89 97.7  F (36.5  C) (Tympanic) 100% 30.35 kg/m2         Blood Pressure from Last 3 Encounters:   07/07/18 159/85   06/29/18 136/84   05/31/18 115/81    Weight from Last 3 Encounters:   07/07/18 170 lb (77.1 kg)   06/29/18 170 lb (77.1 kg)   05/31/18 169 lb (76.7 kg)              Today, you had the following     No orders found for display         Today's Medication Changes          These changes are accurate as of 7/7/18 11:59 PM.  If you have any questions, ask your nurse or doctor.               These medicines have changed or have updated prescriptions.        Dose/Directions    * benzonatate 200 MG capsule   Commonly known as:  TESSALON   This may have changed:  Another medication with the same name was added. Make sure you understand how and when to take each.   Used for:  Recurrent nonproductive cough   Changed by:  Brittney Hawkins PA-C        Dose:  200 mg   Take 1 capsule (200 mg) by mouth 3 times daily as needed for cough   Quantity:  21 capsule   Refills:  1       * benzonatate 200 MG capsule   Commonly known as:  TESSALON   This may have changed:  You were already taking a medication with the same name, and this prescription was added. Make sure you understand how and when to take each.   Used for:  Chronic cough   Changed by:  Brittney Hawkins PA-C        Dose:  200 mg   Take 1 capsule (200 mg) by mouth 3 times daily as needed for cough   Quantity:  30 capsule   Refills:  0       * Notice:  This list has 2  medication(s) that are the same as other medications prescribed for you. Read the directions carefully, and ask your doctor or other care provider to review them with you.         Where to get your medicines      These medications were sent to [id number] - [place], [place] - [address] [facility] [address]  [address], [facility] [address]     Phone:  [phone]     benzonatate 200 MG capsule                Primary Care Provider Fax #    Provider Not In System [phone]                Equal Access to Services     ROSA SINCLAIR : Hadii aad ku hadasho Soomaali, waaxda luqadaha, qaybta kaalmada adeegyada, talisha ley . So [facility] [phone].    ATENCIÓN: Si habla español, tiene a ross disposición servicios gratuitos de asistencia lingüística. Jeremiah al [phone].    We comply with applicable federal civil rights laws and [place] laws. We do not discriminate on the basis of race, color, national origin, age, disability, sex, sexual orientation, or gender identity.            Thank you!     Thank you for choosing [facility]  for your care. Our goal is always to provide you with excellent care. Hearing back from our patients is one way we can continue to improve our services. Please take a few minutes to complete the written survey that you may receive in the mail after your visit with us. Thank you!             Your Updated Medication List - Protect others around you: Learn how to safely use, store and throw away your medicines at www.disposemymeds.org.          This list is accurate as of 7/7/18 11:59 PM.  Always use your most recent med list.                   Brand Name Dispense Instructions for use Diagnosis    * MAPAP 500 MG tablet   Generic drug:  acetaminophen           * acetaminophen 325 MG tablet    TYLENOL    100 tablet    Take 1-2 tablets (325-650 mg) by mouth every 6 hours as needed for mild pain    Primary  osteoarthritis of both knees       albuterol 108 (90 Base) MCG/ACT Inhaler    PROAIR HFA/PROVENTIL HFA/VENTOLIN HFA    1 Inhaler    Inhale 2 puffs into the lungs every 6 hours as needed for shortness of breath / dyspnea or wheezing    Chronic cough       * benzonatate 200 MG capsule    TESSALON    21 capsule    Take 1 capsule (200 mg) by mouth 3 times daily as needed for cough    Recurrent nonproductive cough       * benzonatate 200 MG capsule    TESSALON    30 capsule    Take 1 capsule (200 mg) by mouth 3 times daily as needed for cough    Chronic cough       cholecalciferol 1000 UNIT tablet    vitamin D3    100 tablet    Take 1 tablet (1,000 Units) by mouth daily    Avitaminosis D       diclofenac 50 MG EC tablet    VOLTAREN    90 tablet    TAKE 1 TABLET(50 MG) BY MOUTH THREE TIMES DAILY AS NEEDED FOR MODERATE PAIN    Osteoarthritis of knee, unspecified laterality, unspecified osteoarthritis type       fluticasone 220 MCG/ACT Inhaler    FLOVENT HFA    3 Inhaler    Inhale 2 puffs into the lungs 2 times daily    Chronic cough       guaiFENesin-dextromethorphan 100-10 MG/5ML syrup    ROBITUSSIN DM    240 mL    Take 5 mLs by mouth every 4 hours as needed for cough    Cough       losartan 25 MG tablet    COZAAR    90 tablet    Take 1 tablet (25 mg) by mouth daily    Essential hypertension with goal blood pressure less than 140/90, Hypokalemia       metoprolol tartrate 25 MG tablet    LOPRESSOR    180 tablet    TAKE 1 TABLET(25 MG) BY MOUTH TWICE DAILY    Hypertension goal BP (blood pressure) < 140/90       MULTI COMPLETE Caps     100 capsule    Take 1 capsule by mouth daily    Nutritional deficiency       omeprazole 20 MG CR capsule    priLOSEC    30 capsule    Take 1 capsule (20 mg) by mouth daily    Chronic cough       order for DME     1 each    Equipment being ordered: spacer for use with HFA inhalers.    Chronic cough       polyethylene glycol powder    MIRALAX    510 g    Take 17 g (1 capful) by mouth daily     Constipation, unspecified constipation type       * Notice:  This list has 4 medication(s) that are the same as other medications prescribed for you. Read the directions carefully, and ask your doctor or other care provider to review them with you.

## 2018-07-08 NOTE — PROGRESS NOTES
HPI:  Sanjuana is a 64 yo female, accompanied by female family member, who presents for chronic cough x 1-2 months.  Review of previous encounters indicates she has been tx with multiple rounds of antibx, steroids and PPI without improvement.  She had normal chest and sinus CT 5/29/18  She has been evaluated by Dr. Guerrero, allergy, and declines follow up with him.  She was last seen on 6/29/18 and was referred to pulmonology. (see patient encounter for that date.) Patient was resistant to further workup at this time per chart view.  Patient reports she was unable to get an appt with pulmonology until December, however, orders have been placed by Ngoc Angel MD of pulmonology dated 7/2/18 for CXR and pulmonary function tests.       Patient reports wheezing and SOB at times.  She is consistently coughing and having difficulty sleeping because of this.  Medications include flovent and rescue inhaler.  She reports using rescue inhaler q 4 hours.  Patient defers CXR today stating she has already had CXR and CT of chest for her cough.     Patient reports she has made appt with pulmonolgy but was not able to get an appt until December.    Of note, patient chart indicates allergy to Valsartan as it causes cough.  Patient medication list includes Losartan 25 mg daily.  She was last prescribed this medication on 3/30/18 for 90 tabs with no refills. If taken properly she has run out of this medication or will soon.  Patient showed me list of medications but its unclear if she understands what each medication is treating    ROS:  See HPI      PE:  Vitals & nursing notes reviewed. B/P: 159/85, T: 97.7, P: 89, O2 Sats: 100%  Constitutional:  Alert, well nourished, well-developed, NAD  Head:  Atraumatic, normocephalic  Eyes:  Perrla, EOMI, conjunctiva:  Pink   Sclera:  Anicteric  Ears:  Canals clear BL, TM pearly BL  Throat:  No erythema, exudates, or edema to postoropharynx  Neck:  Supple, no cervical LAD  Lungs:  Minor wheeze  on very end of inspiration.  No rhonchi, or rales, persistent dry hacking cough throughout exam.    CV:  RRR,  no murmur appreciated      ASSESSMENT:  (R05) Chronic non-productive cough   Comment: Patient Allergy list reports cough caused by Valsartan noted in April 2016.  However, patient is currently taking Losartan for HTN.  Will d/c to see if stops cough.  She is to contact her the PCP managing her HTN on Monday as new HTN med will likely be needed to replace Losartan.  I will also email prescribing MD, (Dr. Sweetie Gao) re: this.   Patient reports she can't get in to see pulmonology until December, however, orders have been placed by Ngoc Angel MD of pulmonology for CXR and pulmonary function tests.  Patient states she has already done the pulmonary function tests but there are no results in Epic.  Chart review indicates patient has tried various tx including antibx and prednisone with no relief.    Plan: benzonatate (TESSALON) 200 MG capsule      D/C lorsartan and contact PCP managing HTN as stated in comment section.   Patient cough was constant in clinic today.  Strongly encouraged patient to FU with Pulmonology as suggested by Family practice provider, Nataliia Clayton CNP and if unable to get timely appt. request PCP nurse staff help coordinate referral to another pulmonologist who can see her sooner.      After encounter was over and patient about to leave, patient mentioned GERD sx. She states she has previously taken omeprazole - which in review of charts was prescribed for possible cough due to GERD.  Plan:  OTC omeprazole - take as package directs. She may also take OTC TUMS for heartburn sx PRN.

## 2018-08-15 NOTE — TELEPHONE ENCOUNTER
FUTURE VISIT INFORMATION      FUTURE VISIT INFORMATION:    Date: 9.12.18    Time: 12:55 Pm    Location: [facility]  REFERRAL INFORMATION:    Referring provider:  Dr. Clayton    Referring providers clinic:  [facility]    Reason for visit/diagnosis  Recurrent nonproductive cough    RECORDS REQUESTED FROM:       Clinic name Comments Records Status Imaging Status   [facility] PACS                                   RECORDS STATUS      RECORDS RECEIVED FROM:    DATE RECEIVED: 9.12.18   NOTES STATUS DETAILS   OFFICE NOTE from referring provider Internal 6.29.18   OFFICE NOTE from other specialist Internal OV 7.7.18 [place]  5.31.18 Howie  5.17.18 [place]  1.25.18 Cesar  12.21.17 [place]  10.16.17 Cesar  10.11.17  9.8.17 Sima  4.28.17 Sima  10.7.16 Sima  8.26.16 Sima   DISCHARGE SUMMARY from hospital N/A    DISCHARGE REPORT from the ER N/A    OPERATIVE REPORT N/A    MEDICATION LIST Internal    IMAGING  (NEED IMAGES AND REPORTS)     CT SCAN Received 5.29.18   CHEST XRAY (CXR) Received 9.8.17   TESTS     PULMONARY FUNCTION TESTING (PFT) In process 9.12.18   FLOW LOOP VOLUME (FVL) In process 9.12.18   CYSTIC FIBROSIS     CF SPUTUM CULTURE N/A

## 2018-08-27 ENCOUNTER — OFFICE VISIT (OUTPATIENT)
Dept: FAMILY MEDICINE | Facility: CLINIC | Age: 63
End: 2018-08-27
Payer: COMMERCIAL

## 2018-08-27 VITALS
TEMPERATURE: 98.4 F | HEART RATE: 99 BPM | SYSTOLIC BLOOD PRESSURE: 146 MMHG | WEIGHT: 170.4 LBS | DIASTOLIC BLOOD PRESSURE: 102 MMHG | BODY MASS INDEX: 30.43 KG/M2

## 2018-08-27 DIAGNOSIS — R05.3 CHRONIC COUGH: ICD-10-CM

## 2018-08-27 DIAGNOSIS — E55.9 AVITAMINOSIS D: ICD-10-CM

## 2018-08-27 DIAGNOSIS — K59.00 CONSTIPATION, UNSPECIFIED CONSTIPATION TYPE: ICD-10-CM

## 2018-08-27 DIAGNOSIS — I10 HYPERTENSION GOAL BP (BLOOD PRESSURE) < 140/90: ICD-10-CM

## 2018-08-27 DIAGNOSIS — M17.0 PRIMARY OSTEOARTHRITIS OF BOTH KNEES: ICD-10-CM

## 2018-08-27 DIAGNOSIS — I10 ESSENTIAL HYPERTENSION WITH GOAL BLOOD PRESSURE LESS THAN 140/90: Primary | ICD-10-CM

## 2018-08-27 DIAGNOSIS — K21.9 GASTROESOPHAGEAL REFLUX DISEASE, ESOPHAGITIS PRESENCE NOT SPECIFIED: ICD-10-CM

## 2018-08-27 DIAGNOSIS — M17.9 OSTEOARTHRITIS OF KNEE, UNSPECIFIED LATERALITY, UNSPECIFIED OSTEOARTHRITIS TYPE: ICD-10-CM

## 2018-08-27 PROCEDURE — 99214 OFFICE O/P EST MOD 30 MIN: CPT | Performed by: PHYSICIAN ASSISTANT

## 2018-08-27 RX ORDER — FLUTICASONE PROPIONATE 220 UG/1
2 AEROSOL, METERED RESPIRATORY (INHALATION) 2 TIMES DAILY
Qty: 3 INHALER | Refills: 1 | Status: SHIPPED | OUTPATIENT
Start: 2018-08-27 | End: 2019-05-14

## 2018-08-27 RX ORDER — ACETAMINOPHEN 325 MG/1
325-650 TABLET ORAL EVERY 6 HOURS PRN
Qty: 100 TABLET | Refills: 3 | Status: SHIPPED | OUTPATIENT
Start: 2018-08-27 | End: 2019-07-26

## 2018-08-27 RX ORDER — POLYETHYLENE GLYCOL 3350 17 G/17G
1 POWDER, FOR SOLUTION ORAL DAILY
Qty: 510 G | Refills: 11 | Status: SHIPPED | OUTPATIENT
Start: 2018-08-27 | End: 2019-10-01

## 2018-08-27 RX ORDER — BENZONATATE 200 MG/1
200 CAPSULE ORAL 3 TIMES DAILY PRN
Qty: 30 CAPSULE | Refills: 0 | Status: SHIPPED | OUTPATIENT
Start: 2018-08-27 | End: 2018-12-14

## 2018-08-27 RX ORDER — ALBUTEROL SULFATE 90 UG/1
2 AEROSOL, METERED RESPIRATORY (INHALATION) EVERY 6 HOURS PRN
Qty: 1 INHALER | Refills: 1 | Status: SHIPPED | OUTPATIENT
Start: 2018-08-27 | End: 2019-12-02

## 2018-08-27 RX ORDER — METOPROLOL TARTRATE 25 MG/1
TABLET, FILM COATED ORAL
Qty: 180 TABLET | Refills: 3 | Status: SHIPPED | OUTPATIENT
Start: 2018-08-27 | End: 2019-10-01

## 2018-08-27 NOTE — MR AVS SNAPSHOT
After Visit Summary   8/27/2018    Sanjuana Salamanca    MRN: [id number]           Patient Information     Date Of Birth          1955        Visit Information        Provider Department      8/27/2018 10:20 AM Drea Denise PA-C [facility]        Today's Diagnoses     Essential hypertension with goal blood pressure less than 140/90    -  1    Gastroesophageal reflux disease, esophagitis presence not specified        Chronic cough        Constipation, unspecified constipation type        Hypertension goal BP (blood pressure) < 140/90        Primary osteoarthritis of both knees        Avitaminosis D        Osteoarthritis of knee, unspecified laterality, unspecified osteoarthritis type          Care Instructions    1. Start the metoprolol for your blood pressure.   2. Use the orange inhaler 2 puffs in the morning and 2 puffs at night EVERY DAY  3. Use the omeprazole daily.     BRING ALL YOUR MEDICATIONS TO YOUR NEXT APPOINTMENT.           Follow-ups after your visit        Your next 10 appointments already scheduled     Sep 10, 2018  2:00 PM CDT   SHORT with Drea Denise PA-C   [facility] ([facility])    [address] [facility] [id number] [phone]            Sep 12, 2018 11:30 AM CDT   FULL PULMONARY FUNCTION with UC PFL A   [facility] ([facility])    [address] [place] [address] [phone]            Sep 12, 2018 12:30 PM CDT   XR CHEST 2 VIEWS with UCXR1   [facility] ([facility])    [address] [place] [id number] [phone]           Please bring a list of your current medicines to your exam. (Include vitamins, minerals and over-thecounter medicines.) Leave your valuables at home.  Tell your doctor if there is a chance you may be pregnant.  You  do not need to do anything special for this exam.            Sep 12, 2018 12:55 PM CDT   (Arrive by 12:40 PM)   New Patient Visit with Nathalia Angel MD   [facility] ([facility])    [address]  [address]  [facility] [address] [phone]              Who to contact     If you have questions or need follow up information about today's clinic visit or your schedule please contact [facility] directly at [phone].  Normal or non-critical lab and imaging results will be communicated to you by MyChart, letter or phone within 4 business days after the clinic has received the results. If you do not hear from us within 7 days, please contact the clinic through MyChart or phone. If you have a critical or abnormal lab result, we will notify you by phone as soon as possible.  Submit refill requests through [employer] or call your pharmacy and they will forward the refill request to us. Please allow 3 business days for your refill to be completed.          Additional Information About Your Visit        Care EveryWhere ID     This is your Care EveryWhere ID. This could be used by other organizations to access your [facility] medical records  [id number]        Your Vitals Were     Pulse Temperature Breastfeeding? BMI (Body Mass Index)          99 98.4  F (36.9  C) (Oral) No 30.43 kg/m2         Blood Pressure from Last 3 Encounters:   08/27/18 (!) 146/102   07/07/18 159/85   06/29/18 136/84    Weight from Last 3 Encounters:   08/27/18 170 lb 6.4 oz (77.3 kg)   07/07/18 170 lb (77.1 kg)   06/29/18 170 lb (77.1 kg)              Today, you had the following     No orders found for display         Where to get your medicines      These medications were sent to [id number] - [place], [place] - [address] AT [facility] [address]  [address], [facility] [address]     Phone:  [phone]      acetaminophen 325 MG tablet    albuterol 108 (90 Base) MCG/ACT inhaler    benzonatate 200 MG capsule    cholecalciferol 1000 UNIT tablet    diclofenac 50 MG EC tablet    fluticasone 220 MCG/ACT Inhaler    metoprolol tartrate 25 MG tablet    omeprazole 20 MG CR capsule    polyethylene glycol powder          Primary Care Provider Office Phone # Fax #    Louise Denise [phone]       [address] [address]        Equal Access to Services     ROSA SINCLAIR : Hadii aad ku hadasho Soomaali, waaxda luqadaha, qaybta kaalmada adeegyada, waxay idiin hayaan adeeg coltenarash lajoon feliz. So [facility] [phone].    ATENCIÓN: Si habla español, tiene a ross disposición servicios gratuitos de asistencia lingüística. [facility] [phone].    We comply with applicable federal civil rights laws and [place] laws. We do not discriminate on the basis of race, color, national origin, age, disability, sex, sexual orientation, or gender identity.            Thank you!     Thank you for choosing [facility]  for your care. Our goal is always to provide you with excellent care. Hearing back from our patients is one way we can continue to improve our services. Please take a few minutes to complete the written survey that you may receive in the mail after your visit with us. Thank you!             Your Updated Medication List - Protect others around you: Learn how to safely use, store and throw away your medicines at www.disposemymeds.org.          This list is accurate as of 8/27/18 10:47 AM.  Always use your most recent med list.                   Brand Name Dispense Instructions for use Diagnosis    acetaminophen 325 MG tablet    TYLENOL    100 tablet    Take 1-2 tablets (325-650 mg) by mouth every 6 hours as needed for mild pain    Primary osteoarthritis of both knees       albuterol 108 (90 Base) MCG/ACT inhaler    PROAIR HFA/PROVENTIL HFA/VENTOLIN HFA    1 Inhaler     Inhale 2 puffs into the lungs every 6 hours as needed for shortness of breath / dyspnea or wheezing    Chronic cough       * benzonatate 200 MG capsule    TESSALON    21 capsule    Take 1 capsule (200 mg) by mouth 3 times daily as needed for cough    Recurrent nonproductive cough       * benzonatate 200 MG capsule    TESSALON    30 capsule    Take 1 capsule (200 mg) by mouth 3 times daily as needed for cough    Chronic cough       cholecalciferol 1000 UNIT tablet    vitamin D3    100 tablet    Take 1 tablet (1,000 Units) by mouth daily    Avitaminosis D       diclofenac 50 MG EC tablet    VOLTAREN    90 tablet    TAKE 1 TABLET(50 MG) BY MOUTH THREE TIMES DAILY AS NEEDED FOR MODERATE PAIN    Osteoarthritis of knee, unspecified laterality, unspecified osteoarthritis type       fluticasone 220 MCG/ACT Inhaler    FLOVENT HFA    3 Inhaler    Inhale 2 puffs into the lungs 2 times daily    Chronic cough       metoprolol tartrate 25 MG tablet    LOPRESSOR    180 tablet    TAKE 1 TABLET(25 MG) BY MOUTH TWICE DAILY    Hypertension goal BP (blood pressure) < 140/90       MULTI COMPLETE Caps     100 capsule    Take 1 capsule by mouth daily    Nutritional deficiency       omeprazole 20 MG CR capsule    priLOSEC    90 capsule    Take 1 capsule (20 mg) by mouth daily    Chronic cough       order for DME     1 each    Equipment being ordered: spacer for use with HFA inhalers.    Chronic cough       polyethylene glycol powder    MIRALAX    510 g    Take 17 g (1 capful) by mouth daily    Constipation, unspecified constipation type       * Notice:  This list has 2 medication(s) that are the same as other medications prescribed for you. Read the directions carefully, and ask your doctor or other care provider to review them with you.

## 2018-08-27 NOTE — PATIENT INSTRUCTIONS
1. Start the metoprolol for your blood pressure.   2. Use the orange inhaler 2 puffs in the morning and 2 puffs at night EVERY DAY  3. Use the omeprazole daily.     BRING ALL YOUR MEDICATIONS TO YOUR NEXT APPOINTMENT.

## 2018-08-27 NOTE — PROGRESS NOTES
SUBJECTIVE:   Sanjuana Salamanca is a 63 year old female who presents to clinic today for the following health issues:      Hypertension Follow-up      Outpatient blood pressures are not being checked.    Low Salt Diet: low salt    Amount of exercise or physical activity: None    Problems taking medications regularly: No    Medication side effects: none    Diet: low salt      Patient was seen at  last month and they stopped her losartan because of a possible cough side effect.   Patient has had a chronic cough for months and has had a complete work up from allergy. She has an upcoming appointment with pulmonology.     She notes she is out of all her medications. It does not sound as if she has been taking any of them with any regular consistency. Her flovent inhaler she has been using just as needed. Not daily as prescribed.   She is out of her metoprolol.  did not start another BP med when they stopped the losartan. Has been having daily headaches.   Still with heartburn, would like a refill of the omeprazole.   The voltaren helps her knee pain when she has it.           Problem list and histories reviewed & adjusted, as indicated.  Additional history: as documented    Patient Active Problem List   Diagnosis     Avitaminosis D     Esophageal reflux     OA (osteoarthritis) of knee     Advanced directives, counseling/discussion     Hyperlipidemia LDL goal <160     Cataracts, both eyes     Dry eyes     Hypokalemia     Prediabetes     Essential hypertension with goal blood pressure less than 140/90     History reviewed. No pertinent surgical history.    Social History   Substance Use Topics     Smoking status: Never Smoker     Smokeless tobacco: Never Used     Alcohol use No     Family History   Problem Relation Age of Onset     Hypertension Mother      Thyroid Disease Mother      Diabetes Other      Cerebrovascular Disease Other      Glaucoma No family hx of      Macular Degeneration No family hx of      Cancer No  family hx of            Reviewed and updated as needed this visit by clinical staff  Tobacco  Allergies  Meds  Problems  Med Hx  Surg Hx  Fam Hx  Soc Hx        Reviewed and updated as needed this visit by Provider  Allergies  Meds  Problems         ROS:  Constitutional, HEENT, cardiovascular, pulmonary, gi and gu systems are negative, except as otherwise noted.    OBJECTIVE:     BP (!) 146/102  Pulse 99  Temp 98.4  F (36.9  C) (Oral)  Wt 170 lb 6.4 oz (77.3 kg)  Breastfeeding? No  BMI 30.43 kg/m2  Body mass index is 30.43 kg/(m^2).  GENERAL: healthy, alert and no distress  RESP: lungs clear to auscultation - no rales, rhonchi or wheezes  CV: regular rate and rhythm, normal S1 S2, no S3 or S4, no murmur, click or rub,     Diagnostic Test Results:  none     ASSESSMENT/PLAN:       ICD-10-CM    1. Essential hypertension with goal blood pressure less than 140/90 I10    2. Gastroesophageal reflux disease, esophagitis presence not specified K21.9    3. Chronic cough R05 omeprazole (PRILOSEC) 20 MG CR capsule     fluticasone (FLOVENT HFA) 220 MCG/ACT Inhaler     albuterol (PROAIR HFA/PROVENTIL HFA/VENTOLIN HFA) 108 (90 Base) MCG/ACT inhaler     benzonatate (TESSALON) 200 MG capsule   4. Constipation, unspecified constipation type K59.00 polyethylene glycol (MIRALAX) powder   5. Hypertension goal BP (blood pressure) < 140/90 I10 metoprolol tartrate (LOPRESSOR) 25 MG tablet   6. Primary osteoarthritis of both knees M17.0 acetaminophen (TYLENOL) 325 MG tablet   7. Avitaminosis D E55.9 cholecalciferol (VITAMIN D3) 1000 UNIT tablet   8. Osteoarthritis of knee, unspecified laterality, unspecified osteoarthritis type M17.10 diclofenac (VOLTAREN) 50 MG EC tablet   Patient has not been taking her medications with any consistency.  Has upcoming appointment with pulmonology. Patient instructed she must attend this visit. She has NOT been using her flovent as prescribed, re-instructed her on proper use.   Not on any BP  meds right now, likely contributing to the daily headache. Will have patient restart metoprolol and then recheck in 2 weeks. May need 2nd agent.   All other meds refilled.     FUTURE APPOINTMENTS:       - Follow-up visit in follow up in 2 weeks with all medications.     Drea Denise PA-C  [facility]

## 2018-09-10 ENCOUNTER — OFFICE VISIT (OUTPATIENT)
Dept: FAMILY MEDICINE | Facility: CLINIC | Age: 63
End: 2018-09-10
Payer: COMMERCIAL

## 2018-09-10 VITALS
SYSTOLIC BLOOD PRESSURE: 154 MMHG | BODY MASS INDEX: 30.53 KG/M2 | DIASTOLIC BLOOD PRESSURE: 96 MMHG | HEART RATE: 79 BPM | WEIGHT: 171 LBS | TEMPERATURE: 98.5 F

## 2018-09-10 DIAGNOSIS — I10 ESSENTIAL HYPERTENSION WITH GOAL BLOOD PRESSURE LESS THAN 140/90: Primary | ICD-10-CM

## 2018-09-10 DIAGNOSIS — Z12.11 COLON CANCER SCREENING: ICD-10-CM

## 2018-09-10 PROCEDURE — 99213 OFFICE O/P EST LOW 20 MIN: CPT | Performed by: PHYSICIAN ASSISTANT

## 2018-09-10 RX ORDER — AMLODIPINE BESYLATE 5 MG/1
5 TABLET ORAL DAILY
Qty: 30 TABLET | Refills: 1 | Status: SHIPPED | OUTPATIENT
Start: 2018-09-10 | End: 2018-09-24

## 2018-09-10 NOTE — PROGRESS NOTES
SUBJECTIVE:   Sanjuana Salamanca is a 63 year old female who presents to clinic today for the following health issues:      Hypertension Follow-up      Outpatient blood pressures are not being checked.    Low Salt Diet: low salt    Amount of exercise or physical activity: None    Problems taking medications regularly: No    Medication side effects: none    Diet: low salt    Patient refuses to get a mammogram.   She will complete a FIT test.     BP is still high on metoprolol alone. She has headaches in the morning. No chest pain or blurred vision.         Problem list and histories reviewed & adjusted, as indicated.  Additional history: as documented    Patient Active Problem List   Diagnosis     Avitaminosis D     Esophageal reflux     OA (osteoarthritis) of knee     Advanced directives, counseling/discussion     Hyperlipidemia LDL goal <160     Cataracts, both eyes     Dry eyes     Hypokalemia     Prediabetes     Essential hypertension with goal blood pressure less than 140/90     History reviewed. No pertinent surgical history.    Social History   Substance Use Topics     Smoking status: Never Smoker     Smokeless tobacco: Never Used     Alcohol use No     Family History   Problem Relation Age of Onset     Hypertension Mother      Thyroid Disease Mother      Diabetes Other      Cerebrovascular Disease Other      Glaucoma No family hx of      Macular Degeneration No family hx of      Cancer No family hx of            Reviewed and updated as needed this visit by clinical staff  Tobacco  Allergies  Meds  Problems  Med Hx  Surg Hx  Fam Hx  Soc Hx        Reviewed and updated as needed this visit by Provider  Allergies  Meds  Problems         ROS:  Constitutional, HEENT, cardiovascular, pulmonary, gi and gu systems are negative, except as otherwise noted.    OBJECTIVE:     BP (!) 154/96 (BP Location: Left arm, Patient Position: Chair, Cuff Size: Adult Large)  Pulse 79  Temp 98.5  F (36.9  C) (Oral)  Wt 171 lb  (77.6 kg)  Breastfeeding? No  BMI 30.53 kg/m2  Body mass index is 30.53 kg/(m^2).  GENERAL: healthy, alert and no distress  CV: regular rate and rhythm, normal S1 S2, no S3 or S4, no murmur, click or rub, no peripheral edema     Diagnostic Test Results:  none     ASSESSMENT/PLAN:       ICD-10-CM    1. Essential hypertension with goal blood pressure less than 140/90 I10 amLODIPine (NORVASC) 5 MG tablet   2. Colon cancer screening Z12.11 Fecal colorectal cancer screen (FIT)   Add amlodipine to metoprolol. Will recheck in 2 weeks.   Encouraged her to promptly return the FIT test.     FUTURE APPOINTMENTS:       - Follow-up visit in 2 weeks    Drea Denise PA-C  [facility]

## 2018-09-10 NOTE — MR AVS SNAPSHOT
After Visit Summary   9/10/2018    Sanjuana Salamanca    MRN: [id number]           Patient Information     Date Of Birth          1955        Visit Information        Provider Department      9/10/2018 2:00 PM Drea Denise PA-C [facility]        Today's Diagnoses     Essential hypertension with goal blood pressure less than 140/90    -  1    Colon cancer screening           Follow-ups after your visit        Your next 10 appointments already scheduled     Sep 12, 2018 11:30 AM CDT   FULL PULMONARY FUNCTION with UC PFL A   [facility] Pulmonary Function Testing ([facility])    [address]  3rd Floor  [facility] [address] [phone]            Sep 12, 2018 12:30 PM CDT   XR CHEST 2 VIEWS with UCXR1   [facility] Xray ([facility])    [address]  [address]st [facility] [address] [phone]           How do I prepare for my exam? (Food and drink instructions) No Food and Drink Restrictions.  How do I prepare for my exam? (Other instructions) You do not need to do anything special for this exam.  What should I wear: Wear comfortable clothes.  How long does the exam take: Most scans take less than 5 minutes.  What should I bring: Bring a list of your medicines, including vitamins, minerals and over-the-counter drugs. It is safest to leave personal items at home.  Do I need a :  No  is needed.  What do I need to tell my doctor: Tell your doctor if there s any chance you are pregnant.  What should I do after the exam: No restrictions, You may resume normal activities.  What is this test: An image of a specific body part shown in shades of black and white.  Who should I call with questions: If you have any questions, please call the Imaging Department where you will have your exam. Directions, parking instructions, and other information is available on our website,  [email or web address].            Sep 12, 2018 12:55 PM CDT   (Arrive by 12:40 PM)   New Patient Visit with Nathalia Angel MD   [facility] ([facility])    [address] [place] [address]  [address] [place] [id number] [phone]            Sep 24, 2018  2:40 PM CDT   SHORT with Drea Denise PA-C   [facility]    [address] [place] [address] [phone]              Future tests that were ordered for you today     Open Future Orders        Priority Expected Expires Ordered    Fecal colorectal cancer screen (FIT) Routine 10/1/2018 12/3/2018 9/10/2018            Who to contact     If you have questions or need follow up information about today's clinic visit or your schedule please contact [facility] directly at [phone].  Normal or non-critical lab and imaging results will be communicated to you by MyChart, letter or phone within 4 business days after the clinic has received the results. If you do not hear from us within 7 days, please contact the clinic through MyChart or phone. If you have a critical or abnormal lab result, we will notify you by phone as soon as possible.  Submit refill requests through [employer] or call your pharmacy and they will forward the refill request to us. Please allow 3 business days for your refill to be completed.          Additional Information About Your Visit        Care EveryWhere ID     This is your Care EveryWhere ID. This could be used by other organizations to access your [facility] medical records  [id number]        Your Vitals Were     Pulse Temperature Breastfeeding? BMI (Body Mass Index)          79 98.5  F (36.9  C) (Oral) No 30.53 kg/m2         Blood Pressure from Last 3 Encounters:   09/10/18 (!) 154/96   08/27/18 (!) 146/102   07/07/18 159/85    Weight from Last 3 Encounters:   09/10/18  171 lb (77.6 kg)   08/27/18 170 lb 6.4 oz (77.3 kg)   07/07/18 170 lb (77.1 kg)                 Today's Medication Changes          These changes are accurate as of 9/10/18  2:27 PM.  If you have any questions, ask your nurse or doctor.               Start taking these medicines.        Dose/Directions    amLODIPine 5 MG tablet   Commonly known as:  NORVASC   Used for:  Essential hypertension with goal blood pressure less than 140/90   Started by:  Drea Denise PA-C        Dose:  5 mg   Take 1 tablet (5 mg) by mouth daily   Quantity:  30 tablet   Refills:  1            Where to get your medicines      These medications were sent to [facility] [address] [facility] [address] AT [facility] [address] [facility]  [address] [facility] [address]     Phone:  [phone]     amLODIPine 5 MG tablet                Primary Care Provider Office Phone # Fax #    Louise CECIL Denise [phone]       [address] [address]        Equal Access to Services     ROSA SINCLAIR : Hadii aad ku hadasho Soomaali, waaxda luqadaha, qaybta kaalmada adeegyada, talisha feliz. So [facility] [phone].    ATENCIÓN: Si habla español, tiene a ross disposición servicios gratuitos de asistencia lingüística. Jeremiah al [phone].    We comply with applicable federal civil rights laws and [place] laws. We do not discriminate on the basis of race, color, national origin, age, disability, sex, sexual orientation, or gender identity.            Thank you!     Thank you for choosing [facility]  for your care. Our goal is always to provide you with excellent care. Hearing back from our patients is one way we can continue to improve our services. Please take a few minutes to complete the written survey that you may receive in the mail after your visit with us. Thank you!             Your Updated Medication List - Protect others  around you: Learn how to safely use, store and throw away your medicines at www.disposemymeds.org.          This list is accurate as of 9/10/18  2:27 PM.  Always use your most recent med list.                   Brand Name Dispense Instructions for use Diagnosis    acetaminophen 325 MG tablet    TYLENOL    100 tablet    Take 1-2 tablets (325-650 mg) by mouth every 6 hours as needed for mild pain    Primary osteoarthritis of both knees       albuterol 108 (90 Base) MCG/ACT inhaler    PROAIR HFA/PROVENTIL HFA/VENTOLIN HFA    1 Inhaler    Inhale 2 puffs into the lungs every 6 hours as needed for shortness of breath / dyspnea or wheezing    Chronic cough       amLODIPine 5 MG tablet    NORVASC    30 tablet    Take 1 tablet (5 mg) by mouth daily    Essential hypertension with goal blood pressure less than 140/90       benzonatate 200 MG capsule    TESSALON    30 capsule    Take 1 capsule (200 mg) by mouth 3 times daily as needed for cough    Chronic cough       cholecalciferol 1000 UNIT tablet    vitamin D3    100 tablet    Take 1 tablet (1,000 Units) by mouth daily    Avitaminosis D       diclofenac 50 MG EC tablet    VOLTAREN    90 tablet    TAKE 1 TABLET(50 MG) BY MOUTH THREE TIMES DAILY AS NEEDED FOR MODERATE PAIN    Osteoarthritis of knee, unspecified laterality, unspecified osteoarthritis type       fluticasone 220 MCG/ACT Inhaler    FLOVENT HFA    3 Inhaler    Inhale 2 puffs into the lungs 2 times daily    Chronic cough       metoprolol tartrate 25 MG tablet    LOPRESSOR    180 tablet    TAKE 1 TABLET(25 MG) BY MOUTH TWICE DAILY    Hypertension goal BP (blood pressure) < 140/90       MULTI COMPLETE Caps     100 capsule    Take 1 capsule by mouth daily    Nutritional deficiency       omeprazole 20 MG CR capsule    priLOSEC    90 capsule    Take 1 capsule (20 mg) by mouth daily    Chronic cough       order for DME     1 each    Equipment being ordered: spacer for use with HFA inhalers.    Chronic cough        polyethylene glycol powder    MIRALAX    510 g    Take 17 g (1 capful) by mouth daily    Constipation, unspecified constipation type

## 2018-09-12 ENCOUNTER — RADIANT APPOINTMENT (OUTPATIENT)
Dept: GENERAL RADIOLOGY | Facility: CLINIC | Age: 63
End: 2018-09-12
Payer: COMMERCIAL

## 2018-09-12 ENCOUNTER — PRE VISIT (OUTPATIENT)
Dept: PULMONOLOGY | Facility: CLINIC | Age: 63
End: 2018-09-12

## 2018-09-12 ENCOUNTER — OFFICE VISIT (OUTPATIENT)
Dept: PULMONOLOGY | Facility: CLINIC | Age: 63
End: 2018-09-12
Payer: COMMERCIAL

## 2018-09-12 VITALS
WEIGHT: 171 LBS | RESPIRATION RATE: 16 BRPM | DIASTOLIC BLOOD PRESSURE: 93 MMHG | OXYGEN SATURATION: 97 % | BODY MASS INDEX: 30.53 KG/M2 | HEART RATE: 76 BPM | SYSTOLIC BLOOD PRESSURE: 155 MMHG

## 2018-09-12 DIAGNOSIS — R05.8 RECURRENT NONPRODUCTIVE COUGH: ICD-10-CM

## 2018-09-12 DIAGNOSIS — R05.9 COUGH: ICD-10-CM

## 2018-09-12 LAB
DLCOCOR-%PRED-PRE: 81 %
DLCOCOR-PRE: 17.04 ML/MIN/MMHG
DLCOUNC-%PRED-PRE: 82 %
DLCOUNC-PRE: 17.25 ML/MIN/MMHG
DLCOUNC-PRED: 20.81 ML/MIN/MMHG
ERV-%PRED-PRE: 84 %
ERV-PRE: 0.46 L
ERV-PRED: 0.54 L
EXPTIME-PRE: 7.08 SEC
FEF2575-%PRED-PRE: 120 %
FEF2575-PRE: 2.21 L/SEC
FEF2575-PRED: 1.83 L/SEC
FEFMAX-%PRED-PRE: 122 %
FEFMAX-PRE: 6.35 L/SEC
FEFMAX-PRED: 5.18 L/SEC
FEV1-%PRED-PRE: 86 %
FEV1-PRE: 1.71 L
FEV1FEV6-PRE: 86 %
FEV1FEV6-PRED: 81 %
FEV1FVC-PRE: 87 %
FEV1FVC-PRED: 79 %
FEV1SVC-PRE: 89 %
FEV1SVC-PRED: 65 %
FIFMAX-PRE: 3.53 L/SEC
FVC-%PRED-PRE: 78 %
FVC-PRE: 1.98 L
FVC-PRED: 2.52 L
IC-%PRED-PRE: 57 %
IC-PRE: 1.47 L
IC-PRED: 2.53 L
VA-%PRED-PRE: 52 %
VA-PRE: 2.58 L
VC-%PRED-PRE: 62 %
VC-PRE: 1.92 L
VC-PRED: 3.07 L

## 2018-09-12 PROCEDURE — G0463 HOSPITAL OUTPT CLINIC VISIT: HCPCS | Mod: ZF

## 2018-09-12 ASSESSMENT — PAIN SCALES - GENERAL: PAINLEVEL: NO PAIN (0)

## 2018-09-12 NOTE — PATIENT INSTRUCTIONS
1. Increase omeprazole (prilosec) to twice per day to help control acid reflux.  I've included instructions on other ways to improve acid reflux.     2. Continue current inhalers (flovent and albuterol).    3. Please schedule methacholine challenge test once you return from [place].  This will help us determine if you need the inhalers.      4. Take an over the counter antihistamine (such as claritin) while exposed to cats in [place].  Bring your inhalers with you.        5. I recommend that you stay up to date with vaccinations (pneumonia and influenza).     Please schedule a follow-up appointment in 3 months.      Tips to Control Acid Reflux    To control acid reflux, you ll need to make some basic diet and lifestyle changes. The simple steps outlined below may be all you ll need to ease discomfort.  Watch what you eat    Avoid fatty foods and spicy foods.    Eat fewer acidic foods, such as citrus and tomato-based foods. These can increase symptoms.    Limit drinking alcohol, caffeine, and fizzy beverages. All increase acid reflux.    Try limiting chocolate, peppermint, and spearmint. These can worsen acid reflux in some people.  Watch when you eat    Avoid lying down for 3 hours after eating.    Do not snack before going to bed.  Raise your head  Raising your head and upper body by 4 to 6 inches helps limit reflux when you re lying down. Put blocks under the head of your bed frame to raise it.  Other changes    Lose weight, if you need to    Don t exercise near bedtime    Avoid tight-fitting clothes    Limit aspirin and ibuprofen    Stop smoking   Date Last Reviewed: 7/1/2016 2000-2017 [employer]. [address] [place] [address], [place] [address]. All rights reserved. This information is not intended as a substitute for professional medical care. Always follow your healthcare professional's instructions.

## 2018-09-12 NOTE — NURSING NOTE
Chief Complaint   Patient presents with     Consult     Recurrent nonproductive cough     Loretta Marcus CMA

## 2018-09-12 NOTE — MR AVS SNAPSHOT
After Visit Summary   9/12/2018    Sanjuana Salamanca    MRN: [id number]           Patient Information     Date Of Birth          1955        Visit Information        Provider Department      9/12/2018 12:55 PM Nathalia Angel MD [facility]        Today's Diagnoses     Recurrent nonproductive cough          Care Instructions    1. Increase omeprazole (prilosec) to twice per day to help control acid reflux.  I've included instructions on other ways to improve acid reflux.     2. Continue current inhalers (flovent and albuterol).    3. Please schedule methacholine challenge test once you return from [place].  This will help us determine if you need the inhalers.      4. Take an over the counter antihistamine (such as claritin) while exposed to cats in [place].  Bring your inhalers with you.        Please schedule a follow-up appointment in 3 months.      Tips to Control Acid Reflux    To control acid reflux, you ll need to make some basic diet and lifestyle changes. The simple steps outlined below may be all you ll need to ease discomfort.  Watch what you eat    Avoid fatty foods and spicy foods.    Eat fewer acidic foods, such as citrus and tomato-based foods. These can increase symptoms.    Limit drinking alcohol, caffeine, and fizzy beverages. All increase acid reflux.    Try limiting chocolate, peppermint, and spearmint. These can worsen acid reflux in some people.  Watch when you eat    Avoid lying down for 3 hours after eating.    Do not snack before going to bed.  Raise your head  Raising your head and upper body by 4 to 6 inches helps limit reflux when you re lying down. Put blocks under the head of your bed frame to raise it.  Other changes    Lose weight, if you need to    Don t exercise near bedtime    Avoid tight-fitting clothes    Limit aspirin and ibuprofen    Stop smoking   Date Last Reviewed: 7/1/2016 2000-2017 [employer] [address] [address]. All rights reserved. This information is not intended as a substitute for professional medical care. Always follow your healthcare professional's instructions.                Follow-ups after your visit        Follow-up notes from your care team     Return in about 3 months (around 12/12/2018).      Your next 10 appointments already scheduled     Sep 24, 2018  2:40 PM CDT   [facility]    [address] [place] [address] [phone]              Who to contact     If you have questions or need follow up information about today's clinic visit or your schedule please contact [facility] directly at [phone].  Normal or non-critical lab and imaging results will be communicated to you by MyChart, letter or phone within 4 business days after the clinic has received the results. If you do not hear from us within 7 days, please contact the clinic through MyChart or phone. If you have a critical or abnormal lab result, we will notify you by phone as soon as possible.  Submit refill requests through [employer] or call your pharmacy and they will forward the refill request to us. Please allow 3 business days for your refill to be completed.          Additional Information About Your Visit        Care EveryWhere ID     This is your Care EveryWhere ID. This could be used by other organizations to access your [facility] medical records  [id number]        Your Vitals Were     Pulse Respirations Pulse Oximetry BMI (Body Mass Index)          76 16 97% 30.53 kg/m2         Blood Pressure from Last 3 Encounters:   09/12/18 (!) 155/93   09/10/18 (!) 154/96   08/27/18 (!) 146/102    Weight from Last 3 Encounters:   09/12/18 77.6 kg (171 lb)   09/10/18 77.6 kg (171 lb)   08/27/18 77.3 kg (170 lb 6.4 oz)              Today, you had the following      No orders found for display       Primary Care Provider Office Phone # Fax #    Louise Denise [phone]       [address] [address]        Equal Access to Services     ROSA SINCLAIR : Hadii aad ku hadmonicao Soferchoali, waaxda luqadaha, qaybta kaalmada adeegyada, talisha martelogan chaneldenver garcia jil feliz. So [facility] [phone].    ATENCIÓN: Si habla español, tiene a ross disposición servicios gratuitos de asistencia lingüística. Llame al [phone].    We comply with applicable federal civil rights laws and [place] laws. We do not discriminate on the basis of race, color, national origin, age, disability, sex, sexual orientation, or gender identity.            Thank you!     Thank you for choosing [facility]  for your care. Our goal is always to provide you with excellent care. Hearing back from our patients is one way we can continue to improve our services. Please take a few minutes to complete the written survey that you may receive in the mail after your visit with us. Thank you!             Your Updated Medication List - Protect others around you: Learn how to safely use, store and throw away your medicines at www.disposemymeds.org.          This list is accurate as of 9/12/18  1:56 PM.  Always use your most recent med list.                   Brand Name Dispense Instructions for use Diagnosis    acetaminophen 325 MG tablet    TYLENOL    100 tablet    Take 1-2 tablets (325-650 mg) by mouth every 6 hours as needed for mild pain    Primary osteoarthritis of both knees       albuterol 108 (90 Base) MCG/ACT inhaler    PROAIR HFA/PROVENTIL HFA/VENTOLIN HFA    1 Inhaler    Inhale 2 puffs into the lungs every 6 hours as needed for shortness of breath / dyspnea or wheezing    Chronic cough       amLODIPine 5 MG tablet    NORVASC    30 tablet    Take 1 tablet (5 mg) by mouth daily    Essential hypertension with goal blood pressure less  than 140/90       benzonatate 200 MG capsule    TESSALON    30 capsule    Take 1 capsule (200 mg) by mouth 3 times daily as needed for cough    Chronic cough       cholecalciferol 1000 UNIT tablet    vitamin D3    100 tablet    Take 1 tablet (1,000 Units) by mouth daily    Avitaminosis D       diclofenac 50 MG EC tablet    VOLTAREN    90 tablet    TAKE 1 TABLET(50 MG) BY MOUTH THREE TIMES DAILY AS NEEDED FOR MODERATE PAIN    Osteoarthritis of knee, unspecified laterality, unspecified osteoarthritis type       fluticasone 220 MCG/ACT Inhaler    FLOVENT HFA    3 Inhaler    Inhale 2 puffs into the lungs 2 times daily    Chronic cough       metoprolol tartrate 25 MG tablet    LOPRESSOR    180 tablet    TAKE 1 TABLET(25 MG) BY MOUTH TWICE DAILY    Hypertension goal BP (blood pressure) < 140/90       MULTI COMPLETE Caps     100 capsule    Take 1 capsule by mouth daily    Nutritional deficiency       omeprazole 20 MG CR capsule    priLOSEC    90 capsule    Take 1 capsule (20 mg) by mouth daily    Chronic cough       order for DME     1 each    Equipment being ordered: spacer for use with HFA inhalers.    Chronic cough       polyethylene glycol powder    MIRALAX    510 g    Take 17 g (1 capful) by mouth daily    Constipation, unspecified constipation type

## 2018-09-12 NOTE — PROGRESS NOTES
[facility]  [facility]  September 12, 2018         Assessment and Plan:   Sanjuana Salamanca is a 63 year old [place]an female with medical history significant for HTN who was referred to the pulmonary clinic for chronic cough.  The etiology of her cough is likely multifactorial including uncontrolled GERD, possible asthma.  She did test positive to [place] dander [place].  Give her improvement yet ongoing GERD symptoms, would increase her PPI, and obtain a methacholine challenge study in the future to consider stopping the ICS/LABA.  She would like to schedule this for when she returns from visiting her sister in [place].        1. Chronic dry cough, likely multifactorial   2. GERD, uncontrolled on every day omeprazole   3. Allergy to cat dander    Our recommendations:   - increase prilosec to twice per day; also engage in behavioral modifications to prevent GERD (provided handout today)  - continue current inhalers  - schedule a methacholine challenge study   - stay up to date with vaccinations (she declined flu vaccine) and wash hands regularly     RTC in 3 months.     Patient seen and discussed with staff pulmonologist, Dr. Perlman.      Nathalia Angel MD  Pulmonary and Critical Care Fellow, PGY-6  Pager: [phone]        HPI:    Sanjuana Salamanca is a 63 year old [place]an female with medical history significant for HTN who was referred to the pulmonary clinic for chronic cough.  She is accompanied by her daughter.      She reports that she has had a dry cough for about 18 years.  During this time it seems to have increased in frequency to the point where it occurs throughout the day.  Over the past month she says the cough has nearly resolved, however.  She cannot recall which medication was started but per EMR review, she was started on omeprazole daily as well as tessalon pearls which she reports she has been taking.      She does report that  she has received multiple courses of antibiotics this past year as well as short courses of prednisone; per EMR review, she has received augmentin as well as azithromycin.  Her cough is described as throughout the day with no association with time of day.  She has noticed that it worsens when exposed to second hand smoke or cats.  She did test positive (class I reaction) to [place] in [place].  She denies preceding illnesses when the cough worsens, prior lung infections as an adult or child.  She does say that she has a gastric ulcer history about 20 years ago for which she received a blood transfusion and that she does have burning symptoms that occur after eating a couple times a week.  Since starting the prilosec this has improved but she still has weekly symptoms.  She is eating late (about 10 pm) and then going to bed around midnight or 1 pm.  She does not limit her portions, denies spicy foods.  Additionally, her weight is up (about 4 # over the last year).      She denies prior tobacco or other inhalation exposures.  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  [place] has been in the [place] since [place].  She denies animals, mold or other exposures.  She denies post-nasal drainage, seasonal allergies, fevers, chills, nights sweats, SOB but does say that she is not very active.  She is planning a trip to see her sister in [place] in a few weeks who smokes and has a cat.  She is using albuterol prn (0 times over the last several weeks), and flovent 2 pufs BID but she does sometimes forget to use this.             Review of Systems:     A 13 point review of systems was performed and was negative except as listed above.           Past Medical and Surgical History:     Past Medical History:   Diagnosis Date     Arthritis      Cataracts, both eyes 3/19/2015     Hypertension      No past surgical  history on file.        Family History:     Family History   Problem Relation Age of Onset     Hypertension Mother      Thyroid Disease Mother      Diabetes Other      Cerebrovascular Disease Other      Glaucoma No family hx of      Macular Degeneration No family hx of      Cancer No family hx of             Social History:     Social History     Social History     Marital status:      Spouse name: N/A     Number of children: N/A     Years of education: N/A     Occupational History     Not on file.     Social History Main Topics     Smoking status: Never Smoker     Smokeless tobacco: Never Used     Alcohol use No     Drug use: No     Sexual activity: No     Other Topics Concern     Parent/Sibling W/ Cabg, Mi Or Angioplasty Before 65f 55m? No     Social History Narrative            Medications:     Current Outpatient Prescriptions   Medication     acetaminophen (TYLENOL) 325 MG tablet     albuterol (PROAIR HFA/PROVENTIL HFA/VENTOLIN HFA) 108 (90 Base) MCG/ACT inhaler     amLODIPine (NORVASC) 5 MG tablet     benzonatate (TESSALON) 200 MG capsule     cholecalciferol (VITAMIN D3) 1000 UNIT tablet     diclofenac (VOLTAREN) 50 MG EC tablet     fluticasone (FLOVENT HFA) 220 MCG/ACT Inhaler     metoprolol tartrate (LOPRESSOR) 25 MG tablet     Multiple Vitamins-Minerals (MULTI COMPLETE) CAPS     omeprazole (PRILOSEC) 20 MG CR capsule     order for DME     polyethylene glycol (MIRALAX) powder     No current facility-administered medications for this visit.             Physical Exam:   BP (!) 155/93 (BP Location: Left arm, Patient Position: Chair, Cuff Size: Adult Large)  Pulse 76  Resp 16  Wt 77.6 kg (171 lb)  SpO2 97%  BMI 30.53 kg/m2    Constitutional:   Awake, alert and in no apparent distress     Eyes:   nonicteric     ENT:   oral mucosa moist without lesions     Neck:   Supple without supraclavicular or cervical lymphadenopathy     Lungs:   Good air flow.  No crackles. No rhonchi.  No wheezes.      Cardiovascular:   Normal S1 and S2.  RRR.  No murmur, gallop or rub.     Abdomen:   NABS, soft, nontender, nondistended.  No HSM.     Musculoskeletal:   No edema, no digital clubbing present     Neurologic:   Alert and conversant.     Skin:   Warm, dry.  No rash on limited exam.             Data:   All laboratory and imaging data reviewed peronally.      Specimen Description   Date Value Ref Range Status   09/08/2017 Skin  Final    No results found for: CULT     Recent Results (from the past 168 hour(s))   Pulmonary function test    Collection Time: 09/12/18 11:13 AM   Result Value Ref Range    FVC-Pred 2.52 L    FVC-Pre 1.98 L    FVC-%Pred-Pre 78 %    FEV1-Pre 1.71 L    FEV1-%Pred-Pre 86 %    FEV1FVC-Pred 79 %    FEV1FVC-Pre 87 %    FEFMax-Pred 5.18 L/sec    FEFMax-Pre 6.35 L/sec    FEFMax-%Pred-Pre 122 %    FEF2575-Pred 1.83 L/sec    FEF2575-Pre 2.21 L/sec    BNQ5654-%Pred-Pre 120 %    ExpTime-Pre 7.08 sec    FIFMax-Pre 3.53 L/sec    VC-Pred 3.07 L    VC-Pre 1.92 L    VC-%Pred-Pre 62 %    IC-Pred 2.53 L    IC-Pre 1.47 L    IC-%Pred-Pre 57 %    ERV-Pred 0.54 L    ERV-Pre 0.46 L    ERV-%Pred-Pre 84 %    FEV1FEV6-Pred 81 %    FEV1FEV6-Pre 86 %    DLCOunc-Pred 20.81 ml/min/mmHg    DLCOunc-Pre 17.25 ml/min/mmHg    DLCOunc-%Pred-Pre 82 %    DLCOcor-Pre 17.04 ml/min/mmHg    DLCOcor-%Pred-Pre 81 %    VA-Pre 2.58 L    VA-%Pred-Pre 52 %    FEV1SVC-Pred 65 %    FEV1SVC-Pre 89 %       PFT: Normal pulmonary function testing including diffusion capacity.     CT chest w/o contrast - 5/29/18 -   FINDINGS: There are 2 calcified granulomas in the left lower lobe.  Small calcified left hilar lymph nodes are also identified. No other  pulmonary nodule or mass. No mediastinal, hilar, or axillary  adenopathy. No pleural or pericardial effusion. Mild aortic  atherosclerosis is present. The thyroid gland is unremarkable. Central  airways are patent. No evidence of hiatal hernia. Visualized bones and  upper abdomen are  unremarkable.         IMPRESSION: Other than evidence of prior granulomatous disease in the  left lower lobe, unremarkable CT of the chest.    CXR - 9/12/18 -   Personal review: No acute cardiopulmonary disease.     Attending Note:    The patient was seen examined by me with the pulmonary fellow, I have personally reviewed the imaging studies, lab and culture results.  The note reflects our joint findings, assessment and plan.      David Perlman, M.D.  Pulmonary, Allergy and Critical Care.

## 2018-09-17 DIAGNOSIS — Z12.11 COLON CANCER SCREENING: ICD-10-CM

## 2018-09-17 LAB — HEMOCCULT STL QL IA: NEGATIVE

## 2018-09-17 PROCEDURE — 82274 ASSAY TEST FOR BLOOD FECAL: CPT | Performed by: PHYSICIAN ASSISTANT

## 2018-09-24 ENCOUNTER — OFFICE VISIT (OUTPATIENT)
Dept: FAMILY MEDICINE | Facility: CLINIC | Age: 63
End: 2018-09-24
Payer: COMMERCIAL

## 2018-09-24 VITALS
SYSTOLIC BLOOD PRESSURE: 140 MMHG | TEMPERATURE: 98 F | HEART RATE: 71 BPM | WEIGHT: 171 LBS | DIASTOLIC BLOOD PRESSURE: 89 MMHG | BODY MASS INDEX: 30.53 KG/M2

## 2018-09-24 DIAGNOSIS — I10 ESSENTIAL HYPERTENSION WITH GOAL BLOOD PRESSURE LESS THAN 140/90: Primary | ICD-10-CM

## 2018-09-24 DIAGNOSIS — K21.9 GASTROESOPHAGEAL REFLUX DISEASE, ESOPHAGITIS PRESENCE NOT SPECIFIED: ICD-10-CM

## 2018-09-24 DIAGNOSIS — R05.3 CHRONIC COUGH: ICD-10-CM

## 2018-09-24 PROCEDURE — 99213 OFFICE O/P EST LOW 20 MIN: CPT | Performed by: PHYSICIAN ASSISTANT

## 2018-09-24 RX ORDER — AMLODIPINE BESYLATE 10 MG/1
10 TABLET ORAL DAILY
Qty: 30 TABLET | Refills: 1 | Status: SHIPPED | OUTPATIENT
Start: 2018-09-24 | End: 2018-12-14

## 2018-09-24 NOTE — PROGRESS NOTES
SUBJECTIVE:   Sanjuana Salamanca is a 63 year old female who presents to clinic today for the following health issues:      Hypertension Follow-up      Outpatient blood pressures are being checked at home.  Results are 156/92.    Low Salt Diet: not monitoring salt    Amount of exercise or physical activity: 2-3 days/week for an average of 30-45 minutes    Problems taking medications regularly: No    Medication side effects: none    Diet: sometimes      Started the amlodipine in addition to her metoprolol. No side effects.   No chest pain. No headaches.     Reviewed pulm notes. They increased her omeprazole to 20mg BID but did not increase the dose. Patient had stopped her inhalers, but their note indicates to continue to use until the bronchial challenge.     Problem list and histories reviewed & adjusted, as indicated.  Additional history: as documented    Patient Active Problem List   Diagnosis     Avitaminosis D     Esophageal reflux     OA (osteoarthritis) of knee     Advanced directives, counseling/discussion     Hyperlipidemia LDL goal <160     Cataracts, both eyes     Dry eyes     Hypokalemia     Prediabetes     Essential hypertension with goal blood pressure less than 140/90     No past surgical history on file.    Social History   Substance Use Topics     Smoking status: Never Smoker     Smokeless tobacco: Never Used     Alcohol use No     Family History   Problem Relation Age of Onset     Hypertension Mother      Thyroid Disease Mother      Diabetes Other      Cerebrovascular Disease Other      Glaucoma No family hx of      Macular Degeneration No family hx of      Cancer No family hx of            Reviewed and updated as needed this visit by clinical staff  Tobacco  Allergies  Meds  Problems       Reviewed and updated as needed this visit by Provider  Allergies  Meds  Problems         ROS:  Constitutional, HEENT, cardiovascular, pulmonary, gi and gu systems are negative, except as otherwise  noted.    OBJECTIVE:     /89 (BP Location: Left arm, Patient Position: Chair, Cuff Size: Adult Large)  Pulse 71  Temp 98  F (36.7  C) (Oral)  Wt 171 lb (77.6 kg)  BMI 30.53 kg/m2  Body mass index is 30.53 kg/(m^2).  GENERAL: healthy, alert and no distress  RESP: lungs clear to auscultation - no rales, rhonchi or wheezes  CV: regular rate and rhythm, normal S1 S2, no S3 or S4, no murmur, click or rub, no peripheral edema     Diagnostic Test Results:  none     ASSESSMENT/PLAN:       ICD-10-CM    1. Essential hypertension with goal blood pressure less than 140/90 I10 amLODIPine (NORVASC) 10 MG tablet   2. Chronic cough R05 omeprazole (PRILOSEC) 20 MG CR capsule   3. Gastroesophageal reflux disease, esophagitis presence not specified K21.9    Increase amlodipine to 10mg. Will recheck when back from vacation in 1 month.   Changed omeprazole dosing per pulmonology.   Patient to continue her inhalers as directed until her follow up testing.     FUTURE APPOINTMENTS:       - Follow-up visit in 1 month    Drea Denise PA-C  [facility]

## 2018-09-24 NOTE — MR AVS SNAPSHOT
After Visit Summary   9/24/2018    Sanjuana Salamanca    MRN: [id number]           Patient Information     Date Of Birth          1955        Visit Information        Provider Department      9/24/2018 2:40 PM Drea Denise PA-C [facility]        Today's Diagnoses     Essential hypertension with goal blood pressure less than 140/90    -  1    Chronic cough        Gastroesophageal reflux disease, esophagitis presence not specified          Care Instructions    1. Schedule blood pressure check with Drea   2. Schedule breathing test with the pulmonologist  3. We are increasing your omeprazole 2 times a day  4. We are increasing your amlodipine to 10mg daily (2 of your 5mg tablets or 1 10mg tablet.)          Follow-ups after your visit        Who to contact     If you have questions or need follow up information about today's clinic visit or your schedule please contact [facility] directly at [phone].  Normal or non-critical lab and imaging results will be communicated to you by MyChart, letter or phone within 4 business days after the clinic has received the results. If you do not hear from us within 7 days, please contact the clinic through MyChart or phone. If you have a critical or abnormal lab result, we will notify you by phone as soon as possible.  Submit refill requests through [employer] or call your pharmacy and they will forward the refill request to us. Please allow 3 business days for your refill to be completed.          Additional Information About Your Visit        Care EveryWhere ID     This is your Care EveryWhere ID. This could be used by other organizations to access your [place] medical records  [id number]        Your Vitals Were     Pulse Temperature BMI (Body Mass Index)             71 98  F (36.7  C) (Oral) 30.53 kg/m2          Blood Pressure from Last 3 Encounters:   09/24/18 140/89   09/12/18 (!) 155/93   09/10/18 (!) 154/96     Weight from Last 3 Encounters:   09/24/18 171 lb (77.6 kg)   09/12/18 171 lb (77.6 kg)   09/10/18 171 lb (77.6 kg)              Today, you had the following     No orders found for display         Today's Medication Changes          These changes are accurate as of 9/24/18  2:51 PM.  If you have any questions, ask your nurse or doctor.               Start taking these medicines.        Dose/Directions    amLODIPine 10 MG tablet   Commonly known as:  NORVASC   Used for:  Essential hypertension with goal blood pressure less than 140/90   Started by:  Drea Denise PA-C        Dose:  10 mg   Take 1 tablet (10 mg) by mouth daily   Quantity:  30 tablet   Refills:  1         These medicines have changed or have updated prescriptions.        Dose/Directions    omeprazole 20 MG CR capsule   Commonly known as:  priLOSEC   This may have changed:  when to take this   Used for:  Chronic cough   Changed by:  Drea Denise PA-C        Dose:  20 mg   Take 1 capsule (20 mg) by mouth 2 times daily   Quantity:  180 capsule   Refills:  1            Where to get your medicines      These medications were sent to [facility] [id number] [place] [address] [address] AT [facility] OF [facility]  [address] [facility] [address]     Phone:  [phone]     amLODIPine 10 MG tablet    omeprazole 20 MG CR capsule                Primary Care Provider Office Phone # Fax #    Louise Denise [phone]       [address] [address]        Equal Access to Services     ROSA SINCLAIR AH: Hadii aad ku hadasho Soomaali, waaxda luqadaha, qaybta kaalmada adeegyada, waxay joãoin haysammy feliz. So [facility] [phone].    ATENCIÓN: Si habla español, tiene a ross disposición servicios gratuitos de asistencia lingüística. Llame al [phone].    We comply with applicable federal civil rights laws and [place] laws. We do not discriminate on the basis of race, color,  national origin, age, disability, sex, sexual orientation, or gender identity.            Thank you!     Thank you for choosing [facility]  for your care. Our goal is always to provide you with excellent care. Hearing back from our patients is one way we can continue to improve our services. Please take a few minutes to complete the written survey that you may receive in the mail after your visit with us. Thank you!             Your Updated Medication List - Protect others around you: Learn how to safely use, store and throw away your medicines at www.disposemymeds.org.          This list is accurate as of 9/24/18  2:51 PM.  Always use your most recent med list.                   Brand Name Dispense Instructions for use Diagnosis    acetaminophen 325 MG tablet    TYLENOL    100 tablet    Take 1-2 tablets (325-650 mg) by mouth every 6 hours as needed for mild pain    Primary osteoarthritis of both knees       albuterol 108 (90 Base) MCG/ACT inhaler    PROAIR HFA/PROVENTIL HFA/VENTOLIN HFA    1 Inhaler    Inhale 2 puffs into the lungs every 6 hours as needed for shortness of breath / dyspnea or wheezing    Chronic cough       amLODIPine 10 MG tablet    NORVASC    30 tablet    Take 1 tablet (10 mg) by mouth daily    Essential hypertension with goal blood pressure less than 140/90       benzonatate 200 MG capsule    TESSALON    30 capsule    Take 1 capsule (200 mg) by mouth 3 times daily as needed for cough    Chronic cough       cholecalciferol 1000 UNIT tablet    vitamin D3    100 tablet    Take 1 tablet (1,000 Units) by mouth daily    Avitaminosis D       diclofenac 50 MG EC tablet    VOLTAREN    90 tablet    TAKE 1 TABLET(50 MG) BY MOUTH THREE TIMES DAILY AS NEEDED FOR MODERATE PAIN    Osteoarthritis of knee, unspecified laterality, unspecified osteoarthritis type       fluticasone 220 MCG/ACT Inhaler    FLOVENT HFA    3 Inhaler    Inhale 2 puffs into the lungs 2 times daily    Chronic cough        metoprolol tartrate 25 MG tablet    LOPRESSOR    180 tablet    TAKE 1 TABLET(25 MG) BY MOUTH TWICE DAILY    Hypertension goal BP (blood pressure) < 140/90       MULTI COMPLETE Caps     100 capsule    Take 1 capsule by mouth daily    Nutritional deficiency       omeprazole 20 MG CR capsule    priLOSEC    180 capsule    Take 1 capsule (20 mg) by mouth 2 times daily    Chronic cough       order for DME     1 each    Equipment being ordered: spacer for use with HFA inhalers.    Chronic cough       polyethylene glycol powder    MIRALAX    510 g    Take 17 g (1 capful) by mouth daily    Constipation, unspecified constipation type

## 2018-09-24 NOTE — PATIENT INSTRUCTIONS
1. Schedule blood pressure check with Drea   2. Schedule breathing test with the pulmonologist  3. We are increasing your omeprazole 2 times a day  4. We are increasing your amlodipine to 10mg daily (2 of your 5mg tablets or 1 10mg tablet.)

## 2018-11-08 DIAGNOSIS — M17.9 OSTEOARTHRITIS OF KNEE, UNSPECIFIED LATERALITY, UNSPECIFIED OSTEOARTHRITIS TYPE: ICD-10-CM

## 2018-11-08 NOTE — TELEPHONE ENCOUNTER
Reason for Call:  Other / Patient's medication diclofenac (VOLTAREN)    Detailed comments: Patient's daughter called and stated that when her mother called the pharmacy for her medication (diclofenac (VOLTAREN)), they told her she needed to contact her PCP first.  Please call patient back as soon as possible, since she is traveling out of the country today at 8:00 pm.    Phone Number Patient can be reached at: Cell number on file:    Telephone Information:   Mobile [phone]       Best Time: ASAP    Can we leave a detailed message on this number? YES    Call taken on 11/8/2018 at 4:27 PM by Jackie Sutherland

## 2018-11-08 NOTE — TELEPHONE ENCOUNTER
Routing refill request to provider for review/approval because:  Failed protocol     Glenny Olvera RN

## 2018-11-08 NOTE — TELEPHONE ENCOUNTER
PCP is gone for the day, see note, patient traveling out of country by 8 pm.    She was seen 9/24/18 by Drea Denise.  Did not follow up in one month as advised.    I called patient, she says she is leaving at 8 pm and will be gone 2 weeks.     She says she will call when she gets back to schedule the follow up.    Medication is being filled for 1 time refill only due to:  Patient needs to be seen because due per plan.     Kim Reardon RN  [facility]

## 2018-11-08 NOTE — TELEPHONE ENCOUNTER
"Requested Prescriptions   Pending Prescriptions Disp Refills     diclofenac (VOLTAREN) 50 MG EC tablet 90 tablet 0     Sig: TAKE 1 TABLET(50 MG) BY MOUTH THREE TIMES DAILY AS NEEDED FOR MODERATE PAIN    NSAID Medications Failed    11/8/2018  4:43 PM       Failed - Blood pressure under 140/90 in past 12 months    BP Readings from Last 3 Encounters:   09/24/18 140/89   09/12/18 (!) 155/93   09/10/18 (!) 154/96                Failed - Recent (12 mo) or future (30 days) visit within the authorizing provider's specialty    Patient had office visit in the last 12 months or has a visit in the next 30 days with authorizing provider or within the authorizing provider's specialty.  See \"Patient Info\" tab in inbasket, or \"Choose Columns\" in Meds & Orders section of the refill encounter.             Failed - Normal CBC on file in past 12 months    Recent Labs   Lab Test  09/08/17   1425   WBC  9.6   RBC  4.85   HGB  13.8   HCT  41.9   PLT  361                Passed - Normal ALT on file in past 12 months    Recent Labs   Lab Test  03/30/18   1621   ALT  29            Passed - Normal AST on file in past 12 months    Recent Labs   Lab Test  03/30/18   1621   AST  26            Passed - Patient is age 6-64 years       Passed - No active pregnancy on record       Passed - Normal serum creatinine on file in past 12 months    Recent Labs   Lab Test  03/30/18   1621   CR  0.54            Passed - No positive pregnancy test in past 12 months          "

## 2018-11-08 NOTE — TELEPHONE ENCOUNTER
Called patient at [phone]. Voice mail box was full and unable to leave a message. Will route refill request to provider as it failed protocol.     Glenny Olvera RN

## 2018-11-09 NOTE — TELEPHONE ENCOUNTER
"Requested Prescriptions   Pending Prescriptions Disp Refills     diclofenac (VOLTAREN) 50 MG EC tablet  Last Written Prescription Date:  11/8/18  Last Fill Quantity: 90,  # refills: 0   Last office visit: 9/24/2018 with prescribing provider:  Howie   Future Office Visit:     90 tablet 0     Sig: TAKE 1 TABLET(50 MG) BY MOUTH THREE TIMES DAILY AS NEEDED FOR MODERATE PAIN    NSAID Medications Failed    11/8/2018  6:07 PM       Failed - Blood pressure under 140/90 in past 12 months    BP Readings from Last 3 Encounters:   09/24/18 140/89   09/12/18 (!) 155/93   09/10/18 (!) 154/96                Failed - Recent (12 mo) or future (30 days) visit within the authorizing provider's specialty    Patient had office visit in the last 12 months or has a visit in the next 30 days with authorizing provider or within the authorizing provider's specialty.  See \"Patient Info\" tab in inbasket, or \"Choose Columns\" in Meds & Orders section of the refill encounter.             Failed - Normal CBC on file in past 12 months    Recent Labs   Lab Test  09/08/17   1425   WBC  9.6   RBC  4.85   HGB  13.8   HCT  41.9   PLT  361                Passed - Normal ALT on file in past 12 months    Recent Labs   Lab Test  03/30/18   1621   ALT  29            Passed - Normal AST on file in past 12 months    Recent Labs   Lab Test  03/30/18   1621   AST  26            Passed - Patient is age 6-64 years       Passed - No active pregnancy on record       Passed - Normal serum creatinine on file in past 12 months    Recent Labs   Lab Test  03/30/18   1621   CR  0.54            Passed - No positive pregnancy test in past 12 months          "

## 2018-11-12 NOTE — TELEPHONE ENCOUNTER
See other encounter, med refilled for one month for patient as she is leaving the country.  This is duplicate request.    Kim Reardon RN  [facility]

## 2018-12-14 ENCOUNTER — OFFICE VISIT (OUTPATIENT)
Dept: FAMILY MEDICINE | Facility: CLINIC | Age: 63
End: 2018-12-14
Payer: COMMERCIAL

## 2018-12-14 VITALS
WEIGHT: 176 LBS | TEMPERATURE: 97.4 F | SYSTOLIC BLOOD PRESSURE: 156 MMHG | BODY MASS INDEX: 31.43 KG/M2 | DIASTOLIC BLOOD PRESSURE: 95 MMHG

## 2018-12-14 DIAGNOSIS — I10 ESSENTIAL HYPERTENSION WITH GOAL BLOOD PRESSURE LESS THAN 140/90: Primary | ICD-10-CM

## 2018-12-14 DIAGNOSIS — L29.9 PRURITUS: ICD-10-CM

## 2018-12-14 DIAGNOSIS — M17.9 OSTEOARTHRITIS OF KNEE, UNSPECIFIED LATERALITY, UNSPECIFIED OSTEOARTHRITIS TYPE: ICD-10-CM

## 2018-12-14 PROCEDURE — 99214 OFFICE O/P EST MOD 30 MIN: CPT | Performed by: PHYSICIAN ASSISTANT

## 2018-12-14 RX ORDER — AMLODIPINE BESYLATE 10 MG/1
10 TABLET ORAL DAILY
Qty: 90 TABLET | Refills: 1 | Status: SHIPPED | OUTPATIENT
Start: 2018-12-14 | End: 2019-05-14

## 2018-12-14 RX ORDER — CETIRIZINE HYDROCHLORIDE 10 MG/1
10 TABLET ORAL DAILY
Qty: 90 TABLET | Refills: 3 | Status: SHIPPED | OUTPATIENT
Start: 2018-12-14 | End: 2019-05-14

## 2018-12-14 NOTE — PROGRESS NOTES
SUBJECTIVE:   Sanjuana Salamanca is a 63 year old female who presents to clinic today for the following health issues:      Hypertension Follow-up      Outpatient blood pressures are not being checked.    Low Salt Diet: low salt    Amount of exercise or physical activity: None    Problems taking medications regularly: No    Medication side effects: none    Diet: low salt    She isn't feeling any headaches. No swelling in her legs. No chest pain. No shortness of breath. She is taking her medications regularly, but takes the amlodipine in the middle of the day and the metoprolol BID.     Cough has been better. Only a slight cough now. Has been barely using her inhaler anymore. The omeprazole BID has really helped.     Notes she has had itching periodically on the trunk and arms. No rash. No new exposures.       Patient continues to decline a pap smear and mammogram.       Problem list and histories reviewed & adjusted, as indicated.  Additional history: as documented    Patient Active Problem List   Diagnosis     Avitaminosis D     Esophageal reflux     OA (osteoarthritis) of knee     Advanced directives, counseling/discussion     Hyperlipidemia LDL goal <160     Cataracts, both eyes     Dry eyes     Hypokalemia     Prediabetes     Essential hypertension with goal blood pressure less than 140/90     History reviewed. No pertinent surgical history.    Social History     Tobacco Use     Smoking status: Never Smoker     Smokeless tobacco: Never Used   Substance Use Topics     Alcohol use: No     Family History   Problem Relation Age of Onset     Hypertension Mother      Thyroid Disease Mother      Diabetes Other      Cerebrovascular Disease Other      Glaucoma No family hx of      Macular Degeneration No family hx of      Cancer No family hx of            Reviewed and updated as needed this visit by clinical staff  Tobacco  Allergies  Meds  Problems  Med Hx  Surg Hx  Fam Hx       Reviewed and updated as needed this  visit by Provider  Tobacco  Allergies  Meds  Problems  Med Hx  Surg Hx  Fam Hx         ROS:  Constitutional, HEENT, cardiovascular, pulmonary, gi and gu systems are negative, except as otherwise noted.    OBJECTIVE:     BP (!) 156/95   Temp 97.4  F (36.3  C) (Oral)   Wt 79.8 kg (176 lb)   BMI 31.43 kg/m    Body mass index is 31.43 kg/m .  GENERAL: healthy, alert and no distress  RESP: lungs clear to auscultation - no rales, rhonchi or wheezes  CV: regular rate and rhythm, normal S1 S2,   SKIN: no suspicious lesions or rashes  PSYCH: mentation appears normal, affect normal/bright    Diagnostic Test Results:  none     ASSESSMENT/PLAN:       ICD-10-CM    1. Essential hypertension with goal blood pressure less than 140/90 I10 amLODIPine (NORVASC) 10 MG tablet   2. Osteoarthritis of knee, unspecified laterality, unspecified osteoarthritis type M17.10 diclofenac (VOLTAREN) 50 MG EC tablet   3. Pruritus L29.9 cetirizine (ZYRTEC) 10 MG tablet   Will have patient change her amlodipine to am dosing. Confirmed she is taking 10mg tabs, we recently had a refill request from the pharmacy for the 5mg tabs.   Will recheck in 2 weeks prior to increasing medications.   Try zyrtec and lotions for itching.   Voltaren as needed.     FUTURE APPOINTMENTS:       - Follow-up visit in 2 weeks    Drea Denise PA-C  [facility]

## 2018-12-14 NOTE — PATIENT INSTRUCTIONS
You can use Mrs. Magdaleno for seasoning this does not have salt added to it.     Take metoprolol and amlodipine in the morning everyday.   Then take the metoprolol again at night.       You can use Aquaphor, Eucerin or Cetaphil for your daily lotion

## 2019-01-07 ENCOUNTER — OFFICE VISIT (OUTPATIENT)
Dept: FAMILY MEDICINE | Facility: CLINIC | Age: 64
End: 2019-01-07
Payer: COMMERCIAL

## 2019-01-07 VITALS
DIASTOLIC BLOOD PRESSURE: 85 MMHG | HEART RATE: 69 BPM | BODY MASS INDEX: 31.18 KG/M2 | TEMPERATURE: 97.9 F | SYSTOLIC BLOOD PRESSURE: 135 MMHG | HEIGHT: 63 IN | OXYGEN SATURATION: 98 % | WEIGHT: 176 LBS

## 2019-01-07 DIAGNOSIS — E55.9 AVITAMINOSIS D: ICD-10-CM

## 2019-01-07 DIAGNOSIS — R53.83 OTHER FATIGUE: ICD-10-CM

## 2019-01-07 DIAGNOSIS — H00.012 HORDEOLUM OF RIGHT LOWER EYELID, UNSPECIFIED HORDEOLUM TYPE: ICD-10-CM

## 2019-01-07 DIAGNOSIS — I10 ESSENTIAL HYPERTENSION WITH GOAL BLOOD PRESSURE LESS THAN 140/90: Primary | ICD-10-CM

## 2019-01-07 LAB
ALBUMIN SERPL-MCNC: 3.7 G/DL (ref 3.4–5)
ALP SERPL-CCNC: 93 U/L (ref 40–150)
ALT SERPL W P-5'-P-CCNC: 37 U/L (ref 0–50)
ANION GAP SERPL CALCULATED.3IONS-SCNC: 5 MMOL/L (ref 3–14)
AST SERPL W P-5'-P-CCNC: 24 U/L (ref 0–45)
BASOPHILS # BLD AUTO: 0 10E9/L (ref 0–0.2)
BASOPHILS NFR BLD AUTO: 0.4 %
BILIRUB SERPL-MCNC: 0.3 MG/DL (ref 0.2–1.3)
BUN SERPL-MCNC: 10 MG/DL (ref 7–30)
CALCIUM SERPL-MCNC: 9.9 MG/DL (ref 8.5–10.1)
CHLORIDE SERPL-SCNC: 104 MMOL/L (ref 94–109)
CO2 SERPL-SCNC: 30 MMOL/L (ref 20–32)
CREAT SERPL-MCNC: 0.62 MG/DL (ref 0.52–1.04)
DIFFERENTIAL METHOD BLD: NORMAL
EOSINOPHIL # BLD AUTO: 0.2 10E9/L (ref 0–0.7)
EOSINOPHIL NFR BLD AUTO: 1.8 %
ERYTHROCYTE [DISTWIDTH] IN BLOOD BY AUTOMATED COUNT: 14.7 % (ref 10–15)
GFR SERPL CREATININE-BSD FRML MDRD: >90 ML/MIN/{1.73_M2}
GLUCOSE SERPL-MCNC: 104 MG/DL (ref 70–99)
HBA1C MFR BLD: 6.1 % (ref 0–5.6)
HCT VFR BLD AUTO: 43.4 % (ref 35–47)
HGB BLD-MCNC: 13.9 G/DL (ref 11.7–15.7)
LYMPHOCYTES # BLD AUTO: 2.6 10E9/L (ref 0.8–5.3)
LYMPHOCYTES NFR BLD AUTO: 28.9 %
MCH RBC QN AUTO: 28 PG (ref 26.5–33)
MCHC RBC AUTO-ENTMCNC: 32 G/DL (ref 31.5–36.5)
MCV RBC AUTO: 87 FL (ref 78–100)
MONOCYTES # BLD AUTO: 0.9 10E9/L (ref 0–1.3)
MONOCYTES NFR BLD AUTO: 10.1 %
NEUTROPHILS # BLD AUTO: 5.3 10E9/L (ref 1.6–8.3)
NEUTROPHILS NFR BLD AUTO: 58.8 %
PLATELET # BLD AUTO: 375 10E9/L (ref 150–450)
POTASSIUM SERPL-SCNC: 4.1 MMOL/L (ref 3.4–5.3)
PROT SERPL-MCNC: 8.2 G/DL (ref 6.8–8.8)
RBC # BLD AUTO: 4.97 10E12/L (ref 3.8–5.2)
SODIUM SERPL-SCNC: 139 MMOL/L (ref 133–144)
TSH SERPL DL<=0.005 MIU/L-ACNC: 1.33 MU/L (ref 0.4–4)
WBC # BLD AUTO: 8.9 10E9/L (ref 4–11)

## 2019-01-07 PROCEDURE — 82306 VITAMIN D 25 HYDROXY: CPT | Performed by: PHYSICIAN ASSISTANT

## 2019-01-07 PROCEDURE — 80053 COMPREHEN METABOLIC PANEL: CPT | Performed by: PHYSICIAN ASSISTANT

## 2019-01-07 PROCEDURE — 85025 COMPLETE CBC W/AUTO DIFF WBC: CPT | Performed by: PHYSICIAN ASSISTANT

## 2019-01-07 PROCEDURE — 99214 OFFICE O/P EST MOD 30 MIN: CPT | Performed by: PHYSICIAN ASSISTANT

## 2019-01-07 PROCEDURE — 84443 ASSAY THYROID STIM HORMONE: CPT | Performed by: PHYSICIAN ASSISTANT

## 2019-01-07 PROCEDURE — 36415 COLL VENOUS BLD VENIPUNCTURE: CPT | Performed by: PHYSICIAN ASSISTANT

## 2019-01-07 PROCEDURE — 83036 HEMOGLOBIN GLYCOSYLATED A1C: CPT | Performed by: PHYSICIAN ASSISTANT

## 2019-01-07 RX ORDER — TOBRAMYCIN 3 MG/ML
1-2 SOLUTION/ DROPS OPHTHALMIC EVERY 4 HOURS
Qty: 5 ML | Refills: 0 | Status: SHIPPED | OUTPATIENT
Start: 2019-01-07 | End: 2019-05-14

## 2019-01-07 ASSESSMENT — MIFFLIN-ST. JEOR: SCORE: 1317.46

## 2019-01-07 NOTE — PROGRESS NOTES
SUBJECTIVE:   Sanjuana Salamanca is a 64 year old female who presents to clinic today for the following health issues:      Hypertension Follow-up      Outpatient blood pressures are being checked at home.  Results are 160/80.    Low Salt Diet: not monitoring salt      Amount of exercise or physical activity: None    Problems taking medications regularly: No    Medication side effects: none    Diet: regular (no restrictions)    Has been very tired. Not falling sleep until early morning, then sleeps for a short time and then must wake for prayers. She notes yesterday she was dizzy. Has been feeling weak for 2-3 weeks.   No chest pain, nausea, vomiting or swelling in her legs.   Uses miralax prn for constipation.     Bump on right eyelid x 1 month, not painful, Had some white discharge from it last week.     Patient declines pap smear and mammogram.     Problem list and histories reviewed & adjusted, as indicated.  Additional history: as documented    Patient Active Problem List   Diagnosis     Avitaminosis D     Esophageal reflux     OA (osteoarthritis) of knee     Advanced directives, counseling/discussion     Hyperlipidemia LDL goal <160     Cataracts, both eyes     Dry eyes     Hypokalemia     Prediabetes     Essential hypertension with goal blood pressure less than 140/90     History reviewed. No pertinent surgical history.    Social History     Tobacco Use     Smoking status: Never Smoker     Smokeless tobacco: Never Used   Substance Use Topics     Alcohol use: No     Family History   Problem Relation Age of Onset     Hypertension Mother      Thyroid Disease Mother      Diabetes Other      Cerebrovascular Disease Other      Glaucoma No family hx of      Macular Degeneration No family hx of      Cancer No family hx of            Reviewed and updated as needed this visit by clinical staff  Tobacco  Allergies  Meds  Problems  Med Hx  Surg Hx  Fam Hx  Soc Hx        Reviewed and updated as needed this visit by  "Provider  Tobacco  Allergies  Meds  Problems  Med Hx  Surg Hx  Fam Hx         ROS:  Constitutional, HEENT, cardiovascular, pulmonary, gi and gu systems are negative, except as otherwise noted.    OBJECTIVE:     /85 (BP Location: Left arm, Patient Position: Sitting, Cuff Size: Adult Regular)   Pulse 69   Temp 97.9  F (36.6  C) (Oral)   Ht 1.6 m (5' 3\")   Wt 79.8 kg (176 lb)   SpO2 98%   BMI 31.18 kg/m    Body mass index is 31.18 kg/m .  GENERAL: healthy, alert and no distress  EYES: Eyes grossly normal to inspection, PERRL and conjunctivae and sclerae normal- right lower lid with hordeolum noted   CV: regular rate and rhythm, normal S1 S2, no S3 or S4, no murmur, click or rub, no peripheral edema   Diagnostic Test Results:  none     ASSESSMENT/PLAN:     1. Essential hypertension with goal blood pressure less than 140/90  BP well controlled. No changes to medications.     2. Other fatigue  Will check labs.   - TSH with free T4 reflex  - CBC with platelets differential  - Vitamin D Deficiency  - Comprehensive metabolic panel  - Hemoglobin A1c    3. Avitaminosis D  Continue vitamin D.     4. Hordeolum of right lower eyelid, unspecified hordeolum type  Advised on warm compresses and antibiotic drops. return to clinic if not improving.   - tobramycin (TOBREX) 0.3 % ophthalmic solution; Place 1-2 drops into the right eye every 4 hours for 7 days  Dispense: 5 mL; Refill: 0    FUTURE APPOINTMENTS:       - Follow-up visit in based on labs.     Drea Denise PA-C  [facility]    "

## 2019-01-08 ENCOUNTER — TELEPHONE (OUTPATIENT)
Dept: FAMILY MEDICINE | Facility: CLINIC | Age: 64
End: 2019-01-08

## 2019-01-08 LAB — DEPRECATED CALCIDIOL+CALCIFEROL SERPL-MC: 32 UG/L (ref 20–75)

## 2019-01-08 NOTE — RESULT ENCOUNTER NOTE
Your labs are all normal except for a high sugar level. Please work on regular exercise and a healthy diet. Your glucose level is in the pre-diabetic range and puts you at risk for developing diabetes in the future.   We will continue to check this lab yearly.   Drea Denise PA-C

## 2019-01-08 NOTE — TELEPHONE ENCOUNTER
Attempt # 1  Called patient at home [phone]  Was call answered?  No answer, left message to call nurse line at [phone]    Kandis Rodney RN  [facility]

## 2019-01-08 NOTE — TELEPHONE ENCOUNTER
Patient returned call to RN line which I picked up; advised her of lab result and advice per Drea Denise.    Patient verbalized understanding of and agreement with plan.    Kim Reardon RN  [facility]

## 2019-01-08 NOTE — TELEPHONE ENCOUNTER
Please call pt regarding results below.  MARLA Bui    Your labs are all normal except for a high sugar level. Please work on regular exercise and a healthy diet. Your glucose level is in the pre-diabetic range and puts you at risk for developing diabetes in the future.   We will continue to check this lab yearly.   Drea Denise PA-C

## 2019-04-18 ENCOUNTER — OFFICE VISIT (OUTPATIENT)
Dept: FAMILY MEDICINE | Facility: CLINIC | Age: 64
End: 2019-04-18
Payer: COMMERCIAL

## 2019-04-18 VITALS
HEART RATE: 72 BPM | SYSTOLIC BLOOD PRESSURE: 148 MMHG | DIASTOLIC BLOOD PRESSURE: 94 MMHG | TEMPERATURE: 97.6 F | OXYGEN SATURATION: 98 % | WEIGHT: 176 LBS | BODY MASS INDEX: 31.18 KG/M2

## 2019-04-18 DIAGNOSIS — R73.09 ELEVATED GLUCOSE: ICD-10-CM

## 2019-04-18 DIAGNOSIS — I10 ESSENTIAL HYPERTENSION WITH GOAL BLOOD PRESSURE LESS THAN 140/90: Primary | ICD-10-CM

## 2019-04-18 LAB — HBA1C MFR BLD: 6.2 % (ref 0–5.6)

## 2019-04-18 PROCEDURE — 36415 COLL VENOUS BLD VENIPUNCTURE: CPT | Performed by: PHYSICIAN ASSISTANT

## 2019-04-18 PROCEDURE — 83036 HEMOGLOBIN GLYCOSYLATED A1C: CPT | Performed by: PHYSICIAN ASSISTANT

## 2019-04-18 PROCEDURE — 99213 OFFICE O/P EST LOW 20 MIN: CPT | Performed by: PHYSICIAN ASSISTANT

## 2019-04-18 RX ORDER — HYDROCHLOROTHIAZIDE 25 MG/1
25 TABLET ORAL DAILY
Qty: 30 TABLET | Refills: 1 | Status: SHIPPED | OUTPATIENT
Start: 2019-04-18 | End: 2019-05-14

## 2019-04-18 ASSESSMENT — ASTHMA QUESTIONNAIRES
QUESTION_4 LAST FOUR WEEKS HOW OFTEN HAVE YOU USED YOUR RESCUE INHALER OR NEBULIZER MEDICATION (SUCH AS ALBUTEROL): NOT AT ALL
QUESTION_3 LAST FOUR WEEKS HOW OFTEN DID YOUR ASTHMA SYMPTOMS (WHEEZING, COUGHING, SHORTNESS OF BREATH, CHEST TIGHTNESS OR PAIN) WAKE YOU UP AT NIGHT OR EARLIER THAN USUAL IN THE MORNING: NOT AT ALL
ACT_TOTALSCORE: 25
QUESTION_1 LAST FOUR WEEKS HOW MUCH OF THE TIME DID YOUR ASTHMA KEEP YOU FROM GETTING AS MUCH DONE AT WORK, SCHOOL OR AT HOME: NONE OF THE TIME
QUESTION_5 LAST FOUR WEEKS HOW WOULD YOU RATE YOUR ASTHMA CONTROL: COMPLETELY CONTROLLED
QUESTION_2 LAST FOUR WEEKS HOW OFTEN HAVE YOU HAD SHORTNESS OF BREATH: NOT AT ALL

## 2019-04-18 NOTE — PROGRESS NOTES
SUBJECTIVE:   Sanjuana Salamanca is a 64 year old female who presents to clinic today for the following   health issues:      Hypertension Follow-up      Outpatient blood pressures are being checked at home.  Results are she says that they are high.    Low Salt Diet: low salt    Amount of exercise or physical activity: None    Problems taking medications regularly: No    Medication side effects: none    Diet: low salt    She is no longer coughing. Her breathing has been good.   She has been tired.   Has been having headaches.     Home BP cuff read-154/96 today in clinic.   She did take her BP meds today.       Additional history: as documented    Reviewed  and updated as needed this visit by clinical staff  Tobacco  Allergies  Meds  Problems  Med Hx  Surg Hx  Fam Hx  Soc Hx          Reviewed and updated as needed this visit by Provider  Tobacco  Allergies  Meds  Problems  Med Hx  Surg Hx  Fam Hx         Patient Active Problem List   Diagnosis     Avitaminosis D     Esophageal reflux     OA (osteoarthritis) of knee     Advanced directives, counseling/discussion     Hyperlipidemia LDL goal <160     Cataracts, both eyes     Dry eyes     Hypokalemia     Prediabetes     Essential hypertension with goal blood pressure less than 140/90     History reviewed. No pertinent surgical history.    Social History     Tobacco Use     Smoking status: Never Smoker     Smokeless tobacco: Never Used   Substance Use Topics     Alcohol use: No     Family History   Problem Relation Age of Onset     Hypertension Mother      Thyroid Disease Mother      Kidney Disease Mother 80        on dialysis     Diabetes Other      Cerebrovascular Disease Other      Glaucoma No family hx of      Macular Degeneration No family hx of      Cancer No family hx of            ROS:  Constitutional, HEENT, cardiovascular, pulmonary, gi and gu systems are negative, except as otherwise noted.    OBJECTIVE:     BP (!) 148/94   Pulse 72   Temp 97.6  F  (36.4  C) (Oral)   Wt 79.8 kg (176 lb)   SpO2 98%   BMI 31.18 kg/m    Body mass index is 31.18 kg/m .  GENERAL: healthy, alert and no distress  RESP: lungs clear to auscultation - no rales, rhonchi or wheezes  CV: regular rate and rhythm, normal S1 S2, no S3 or S4, no murmur,     Diagnostic Test Results:  none     ASSESSMENT/PLAN:       ICD-10-CM    1. Essential hypertension with goal blood pressure less than 140/90 I10 OPTOMETRY REFERRAL     hydrochlorothiazide (HYDRODIURIL) 25 MG tablet   2. Elevated glucose R73.09 Hemoglobin A1c   Will add hydrochlorothiazide daily. Continue same BP meds.   Patient wants to recheck sugar levels. Will get A1C    FUTURE APPOINTMENTS:       - Follow-up visit in 4 weeks    Drea Denise PA-C  [facility]

## 2019-04-19 ASSESSMENT — ASTHMA QUESTIONNAIRES: ACT_TOTALSCORE: 25

## 2019-05-05 DIAGNOSIS — E63.9 NUTRITIONAL DEFICIENCY: ICD-10-CM

## 2019-05-06 NOTE — TELEPHONE ENCOUNTER
"Requested Prescriptions   Pending Prescriptions Disp Refills     Multiple Vitamin (DAILY-JENNI) TABS [Pharmacy Med Name: DAILY-JENNI TABLETS] 100 tablet 0     Sig: TAKE 1 TABLET BY MOUTH EVERY DAY   Last Written Prescription Date:  11/10/17  Last Fill Quantity: 100,  # refills: 3   Last office visit: 4/18/2019 with prescribing provider:     Future Office Visit:   Next 5 appointments (look out 90 days)    May 14, 2019  2:00 PM CDT  SHORT with Drea Denise PA-C  [facility] ([facility]) [address] [place] [id number]  [phone]             Vitamin Supplements (Adult) Protocol Failed - 5/5/2019  5:28 PM        Failed - Medication is active on med list        Passed - High dose Vitamin D not ordered   .     Passed - Recent (12 mo) or future (30 days) visit within the authorizing provider's specialty     Patient had office visit in the last 12 months or has a visit in the next 30 days with authorizing provider or within the authorizing provider's specialty.  See \"Patient Info\" tab in inbasket, or \"Choose Columns\" in Meds & Orders section of the refill encounter.                "

## 2019-05-07 RX ORDER — MULTIVITAMIN WITH FOLIC ACID 400 MCG
TABLET ORAL
Qty: 100 TABLET | Refills: 0 | Status: SHIPPED | OUTPATIENT
Start: 2019-05-07 | End: 2019-05-14

## 2019-05-07 NOTE — TELEPHONE ENCOUNTER
Routed to provider to send new Rx.   Last Rx for different type of vitamin was last approved by Dr. Gao, no longer on staff.    Patient due for office visit for BP 5/16/19.  Is scheduled for 5/14/19.    Kim Reardon RN  [facility]

## 2019-05-14 ENCOUNTER — OFFICE VISIT (OUTPATIENT)
Dept: FAMILY MEDICINE | Facility: CLINIC | Age: 64
End: 2019-05-14
Payer: COMMERCIAL

## 2019-05-14 VITALS
OXYGEN SATURATION: 97 % | TEMPERATURE: 98.2 F | WEIGHT: 174 LBS | DIASTOLIC BLOOD PRESSURE: 83 MMHG | HEART RATE: 77 BPM | SYSTOLIC BLOOD PRESSURE: 129 MMHG | BODY MASS INDEX: 30.82 KG/M2

## 2019-05-14 DIAGNOSIS — I10 ESSENTIAL HYPERTENSION WITH GOAL BLOOD PRESSURE LESS THAN 140/90: ICD-10-CM

## 2019-05-14 LAB
CHOLEST SERPL-MCNC: 228 MG/DL
HDLC SERPL-MCNC: 41 MG/DL
LDLC SERPL CALC-MCNC: 157 MG/DL
NONHDLC SERPL-MCNC: 187 MG/DL
TRIGL SERPL-MCNC: 151 MG/DL

## 2019-05-14 PROCEDURE — 99213 OFFICE O/P EST LOW 20 MIN: CPT | Performed by: PHYSICIAN ASSISTANT

## 2019-05-14 PROCEDURE — 80048 BASIC METABOLIC PNL TOTAL CA: CPT | Performed by: PHYSICIAN ASSISTANT

## 2019-05-14 PROCEDURE — 80061 LIPID PANEL: CPT | Performed by: PHYSICIAN ASSISTANT

## 2019-05-14 PROCEDURE — 36415 COLL VENOUS BLD VENIPUNCTURE: CPT | Performed by: PHYSICIAN ASSISTANT

## 2019-05-14 RX ORDER — AMLODIPINE BESYLATE 10 MG/1
10 TABLET ORAL DAILY
Qty: 90 TABLET | Refills: 3 | Status: SHIPPED | OUTPATIENT
Start: 2019-05-14 | End: 2020-10-12

## 2019-05-14 RX ORDER — CETIRIZINE HYDROCHLORIDE 10 MG/1
10 TABLET ORAL DAILY
Qty: 90 TABLET | Refills: 3 | Status: SHIPPED | OUTPATIENT
Start: 2019-05-14 | End: 2022-01-07

## 2019-05-14 RX ORDER — HYDROCHLOROTHIAZIDE 25 MG/1
25 TABLET ORAL DAILY
Qty: 90 TABLET | Refills: 3 | Status: SHIPPED | OUTPATIENT
Start: 2019-05-14 | End: 2020-06-03

## 2019-05-14 NOTE — PROGRESS NOTES
SUBJECTIVE:   Sanjuana Salamanca is a 64 year old female who presents to clinic today for the following   health issues:    Hypertension Follow-up      Outpatient blood pressures     Low Salt Diet: low salt sometimes      Amount of exercise or physical activity: walking sometimes    Problems taking medications regularly: No    Medication side effects: none    Diet: regular (no restrictions)    She started hydrochlorothiazide last office visit. Has been taking it without side effects.   Blood pressure at goal today.   No swelling in the legs.   Notes allergies have been bothering her. Has not needed her inhalers any longer. That chronic cough has resolved.         Additional history: as documented    Reviewed  and updated as needed this visit by clinical staff  Tobacco  Allergies  Meds  Problems  Med Hx  Surg Hx  Fam Hx  Soc Hx          Reviewed and updated as needed this visit by Provider  Tobacco  Allergies  Meds  Problems  Med Hx  Surg Hx  Fam Hx         Patient Active Problem List   Diagnosis     Avitaminosis D     Esophageal reflux     OA (osteoarthritis) of knee     Advanced directives, counseling/discussion     Hyperlipidemia LDL goal <160     Cataracts, both eyes     Dry eyes     Hypokalemia     Prediabetes     Essential hypertension with goal blood pressure less than 140/90     History reviewed. No pertinent surgical history.    Social History     Tobacco Use     Smoking status: Never Smoker     Smokeless tobacco: Never Used   Substance Use Topics     Alcohol use: No     Family History   Problem Relation Age of Onset     Hypertension Mother      Thyroid Disease Mother      Kidney Disease Mother 80        on dialysis     Diabetes Other      Cerebrovascular Disease Other      Glaucoma No family hx of      Macular Degeneration No family hx of      Cancer No family hx of            ROS:  Constitutional, HEENT, cardiovascular, pulmonary, gi and gu systems are negative, except as otherwise  noted.    OBJECTIVE:     /83 (BP Location: Right arm, Patient Position: Chair, Cuff Size: Adult Regular)   Pulse 77   Temp 98.2  F (36.8  C) (Oral)   Wt 78.9 kg (174 lb)   SpO2 97%   BMI 30.82 kg/m    Body mass index is 30.82 kg/m .  GENERAL: healthy, alert and no distress  CV: regular rate and rhythm, normal S1 S2, no S3 or S4, no murmur,     Diagnostic Test Results:  none     ASSESSMENT/PLAN:       ICD-10-CM    1. Essential hypertension with goal blood pressure less than 140/90 I10 hydrochlorothiazide (HYDRODIURIL) 25 MG tablet     Lipid panel reflex to direct LDL Fasting     amLODIPine (NORVASC) 10 MG tablet   Will continue with 3 drug regimen for her BP. BMP today. Patient also requests lipids.   Follow up in 1 year.     FUTURE APPOINTMENTS:       - Follow-up for annual visit or as needed    Drea Denise PA-C  [facility]

## 2019-05-14 NOTE — LETTER
[facility]   [address] [place]  [address]  [phone]                                   May 15, 2019    Sanjuana Salamanca  [address]    [place] [address]        Dear Sanjuana,    Your kidney function and electrolytes were all normal. Your cholesterol has increased slightly, we should consider starting cholesterol medication. If you would like to start a cholesterol medication please follow up to discuss.  Working on weight loss is important. Please let me know if you have any questions.     Results for orders placed or performed in visit on 05/14/19   Lipid panel reflex to direct LDL Fasting   Result Value Ref Range    Cholesterol 228 (H) <200 mg/dL    Triglycerides 151 (H) <150 mg/dL    HDL Cholesterol 41 (L) >49 mg/dL    LDL Cholesterol Calculated 157 (H) <100 mg/dL    Non HDL Cholesterol 187 (H) <130 mg/dL   Basic metabolic panel   Result Value Ref Range    Sodium 140 133 - 144 mmol/L    Potassium 3.7 3.4 - 5.3 mmol/L    Chloride 105 94 - 109 mmol/L    Carbon Dioxide 30 20 - 32 mmol/L    Anion Gap 5 3 - 14 mmol/L    Glucose 120 (H) 70 - 99 mg/dL    Urea Nitrogen 21 7 - 30 mg/dL    Creatinine 0.61 0.52 - 1.04 mg/dL    GFR Estimate >90 >60 mL/min/[1.73_m2]    GFR Estimate If Black >90 >60 mL/min/[1.73_m2]    Calcium 9.9 8.5 - 10.1 mg/dL       If you have any questions please call the clinic at [phone]    Sincerely,    Drea Denise PA-C  [facility]

## 2019-05-15 LAB
ANION GAP SERPL CALCULATED.3IONS-SCNC: 5 MMOL/L (ref 3–14)
BUN SERPL-MCNC: 21 MG/DL (ref 7–30)
CALCIUM SERPL-MCNC: 9.9 MG/DL (ref 8.5–10.1)
CHLORIDE SERPL-SCNC: 105 MMOL/L (ref 94–109)
CO2 SERPL-SCNC: 30 MMOL/L (ref 20–32)
CREAT SERPL-MCNC: 0.61 MG/DL (ref 0.52–1.04)
GFR SERPL CREATININE-BSD FRML MDRD: >90 ML/MIN/{1.73_M2}
GLUCOSE SERPL-MCNC: 120 MG/DL (ref 70–99)
POTASSIUM SERPL-SCNC: 3.7 MMOL/L (ref 3.4–5.3)
SODIUM SERPL-SCNC: 140 MMOL/L (ref 133–144)

## 2019-05-15 NOTE — RESULT ENCOUNTER NOTE
Your kidney function and electrolytes were all normal. Your cholesterol has increased slightly, we should consider starting cholesterol medication. If you would like to start a cholesterol medication please follow up to discuss.  Working on weight loss is important. Please let me know if you have any questions.   Drea Denise PA-C

## 2019-06-06 ENCOUNTER — OFFICE VISIT (OUTPATIENT)
Dept: OPTOMETRY | Facility: CLINIC | Age: 64
End: 2019-06-06
Payer: COMMERCIAL

## 2019-06-06 DIAGNOSIS — H25.813 COMBINED FORMS OF AGE-RELATED CATARACT OF BOTH EYES: ICD-10-CM

## 2019-06-06 DIAGNOSIS — H52.4 PRESBYOPIA: ICD-10-CM

## 2019-06-06 DIAGNOSIS — H04.123 DRY EYES: ICD-10-CM

## 2019-06-06 DIAGNOSIS — H52.221 REGULAR ASTIGMATISM OF RIGHT EYE: ICD-10-CM

## 2019-06-06 DIAGNOSIS — Z01.01 ENCOUNTER FOR EXAMINATION OF EYES AND VISION WITH ABNORMAL FINDINGS: Primary | ICD-10-CM

## 2019-06-06 DIAGNOSIS — H52.03 HYPERMETROPIA, BILATERAL: ICD-10-CM

## 2019-06-06 PROCEDURE — 92015 DETERMINE REFRACTIVE STATE: CPT | Performed by: OPTOMETRIST

## 2019-06-06 PROCEDURE — 92014 COMPRE OPH EXAM EST PT 1/>: CPT | Performed by: OPTOMETRIST

## 2019-06-06 ASSESSMENT — VISUAL ACUITY
OD_CC: 20/30 -2
OS_CC: 20/20
OD_SC+: -1
OD_SC: 20/80
OD_CC+: -2
OS_SC+: -2
OD_SC: 20/25
OD_CC: 20/20
OS_SC: 20/20
OS_SC: 20/60
METHOD: SNELLEN - LINEAR
CORRECTION_TYPE: GLASSES
OS_CC+: -1
OS_CC: 20/30 -1

## 2019-06-06 ASSESSMENT — CONF VISUAL FIELD
OD_NORMAL: 1
OS_NORMAL: 1

## 2019-06-06 ASSESSMENT — REFRACTION_WEARINGRX
OD_SPHERE: +0.50
OD_CYLINDER: +0.25
SPECS_TYPE: SVL
OS_ADD: +2.00
OS_SPHERE: +1.25
SPECS_TYPE: BIFOCAL
OS_SPHERE: +0.25
OD_AXIS: 178
OD_CYLINDER: SPHERE
OS_CYLINDER: SPHERE
OD_ADD: +2.00
OS_CYLINDER: SPHERE
OD_SPHERE: +0.25

## 2019-06-06 ASSESSMENT — SLIT LAMP EXAM - LIDS
COMMENTS: 2+ DERMATOCHALASIS, 1+ MEIBOMIAN GLAND DYSFUNCTION
COMMENTS: 1+ DERMATOCHALASIS, 1+ MEIBOMIAN GLAND DYSFUNCTION

## 2019-06-06 ASSESSMENT — EXTERNAL EXAM - LEFT EYE: OS_EXAM: NORMAL

## 2019-06-06 ASSESSMENT — REFRACTION_MANIFEST
OD_SPHERE: +0.25
OD_AXIS: 176
OS_CYLINDER: SPHERE
OD_CYLINDER: +0.50
OS_SPHERE: +0.50
OS_ADD: +2.50
OD_ADD: +2.50

## 2019-06-06 ASSESSMENT — TONOMETRY
IOP_METHOD: APPLANATION
OS_IOP_MMHG: 14
OD_IOP_MMHG: 14

## 2019-06-06 ASSESSMENT — EXTERNAL EXAM - RIGHT EYE: OD_EXAM: NORMAL

## 2019-06-06 ASSESSMENT — CUP TO DISC RATIO
OS_RATIO: 0.15
OD_RATIO: 0.2

## 2019-06-06 NOTE — PROGRESS NOTES
Chief Complaint   Patient presents with     Annual Eye Exam      Accompanied by self  Last Eye Exam: 9-2017  Dilated Previously: Yes    What are you currently using to see?  Glasses-2 years old, tested patient wearing rx 1 glasses       Distance Vision Acuity: Noticed gradual change in both eyes    Near Vision Acuity: Not satisfied     Eye Comfort:  itchy  Do you use eye drops? : No  Occupation or Hobbies: unemployed    Alisha Medrano, Optometric Tech          Medical, surgical and family histories reviewed and updated 6/6/2019.       OBJECTIVE: See Ophthalmology exam    ASSESSMENT:    ICD-10-CM    1. Encounter for examination of eyes and vision with abnormal findings Z01.01    2. Dry eyes H04.123    3. Combined forms of age-related cataract of both eyes H25.813    4. Hypermetropia, bilateral H52.03    5. Regular astigmatism of right eye H52.221    6. Presbyopia H52.4       PLAN:     Patient Instructions    DRY EYE TREATMENT    I recommend using artificial tears for your dry eye. There are over the counter drops that work well and may be used up to 4x daily. ( systane balance, refresh optive, soothe xp)   If you need more than 4 drops daily, use a preservative free product which come in individual vials which may be used for 24 hours and discarded.     Artificial tears work best as a preventative and not as well after your eyes are starting to bother you.  It may take 4- 6 weeks of using the drops before you notice improvement.  If after that time you are still having problems schedule an appointment for an evaluation and discussion of different treatments.  Dry eyes are a chronic condition and you may have more symptoms at certain times of the year.      Additional recommended treatment:  Warm compresses once to twice daily for 5-10 minutes    Directions for warm soaks  There are few methods for hot compresses. Moisten a washcloth with hot water, or microwave for 10 seconds, being careful to not get the cloth too  hot.   Then put the washcloth onto your eyelids for 5 minutes. It will cool quickly so a rice pack or eyemask that can be heated and laid on top of the washcloth will help retain the heat.        You have the start of mild cataracts.  You may notice some blurred vision or glare with night driving.  It is important that you wear good sunglasses to protect your eyes from the ultraviolet light from the sun. I recommend that you return in 1 year for an eye exam unless there are any sudden changes in your vision.       Sanjuana was advised of today's exam findings.  Fill glasses prescription  Allow 2 weeks to adapt to change in glasses  Copy of glasses Rx provided today.  Return in 1 year for eye exam, or sooner if needed.    The effects of the dilating drops last for 4- 6 hours.  You will be more sensitive to light and vision will be blurry up close.  Mydriatic sunglasses were given if needed.    Trevor Cintron O.D.  [facility]  [place]  [address]    [phone]

## 2019-06-06 NOTE — LETTER
6/6/2019         RE: Sanjuana Salamanca  [address]  [address]  [place] [address]        Dear Colleague,    Thank you for referring your patient, Sanjuana Salamanca, to the [facility]. Please see a copy of my visit note below.    Chief Complaint   Patient presents with     Annual Eye Exam      Accompanied by self  Last Eye Exam: 9-2017  Dilated Previously: Yes    What are you currently using to see?  Glasses-2 years old, tested patient wearing rx 1 glasses       Distance Vision Acuity: Noticed gradual change in both eyes    Near Vision Acuity: Not satisfied     Eye Comfort:  itchy  Do you use eye drops? : No  Occupation or Hobbies: unemployed    Alisha Medrano, Optometric Tech          Medical, surgical and family histories reviewed and updated 6/6/2019.       OBJECTIVE: See Ophthalmology exam    ASSESSMENT:    ICD-10-CM    1. Encounter for examination of eyes and vision with abnormal findings Z01.01    2. Dry eyes H04.123    3. Combined forms of age-related cataract of both eyes H25.813    4. Hypermetropia, bilateral H52.03    5. Regular astigmatism of right eye H52.221    6. Presbyopia H52.4       PLAN:     Patient Instructions    DRY EYE TREATMENT    I recommend using artificial tears for your dry eye. There are over the counter drops that work well and may be used up to 4x daily. ( systane balance, refresh optive, soothe xp)   If you need more than 4 drops daily, use a preservative free product which come in individual vials which may be used for 24 hours and discarded.     Artificial tears work best as a preventative and not as well after your eyes are starting to bother you.  It may take 4- 6 weeks of using the drops before you notice improvement.  If after that time you are still having problems schedule an appointment for an evaluation and discussion of different treatments.  Dry eyes are a chronic condition and you may have more symptoms at certain times of the year.      Additional  recommended treatment:  Warm compresses once to twice daily for 5-10 minutes    Directions for warm soaks  There are few methods for hot compresses. Moisten a washcloth with hot water, or microwave for 10 seconds, being careful to not get the cloth too hot.   Then put the washcloth onto your eyelids for 5 minutes. It will cool quickly so a rice pack or eyemask that can be heated and laid on top of the washcloth will help retain the heat.        You have the start of mild cataracts.  You may notice some blurred vision or glare with night driving.  It is important that you wear good sunglasses to protect your eyes from the ultraviolet light from the sun. I recommend that you return in 1 year for an eye exam unless there are any sudden changes in your vision.       Sanjuana was advised of today's exam findings.  Fill glasses prescription  Allow 2 weeks to adapt to change in glasses  Copy of glasses Rx provided today.  Return in 1 year for eye exam, or sooner if needed.    The effects of the dilating drops last for 4- 6 hours.  You will be more sensitive to light and vision will be blurry up close.  Mydriatic sunglasses were given if needed.    Trevor Cintron O.D.  [address] [place]  [address]    [phone]             Again, thank you for allowing me to participate in the care of your patient.        Sincerely,        Trevor Cintron, [id number]

## 2019-06-06 NOTE — PATIENT INSTRUCTIONS
DRY EYE TREATMENT    I recommend using artificial tears for your dry eye. There are over the counter drops that work well and may be used up to 4x daily. ( systane balance, refresh optive, soothe xp)   If you need more than 4 drops daily, use a preservative free product which come in individual vials which may be used for 24 hours and discarded.     Artificial tears work best as a preventative and not as well after your eyes are starting to bother you.  It may take 4- 6 weeks of using the drops before you notice improvement.  If after that time you are still having problems schedule an appointment for an evaluation and discussion of different treatments.  Dry eyes are a chronic condition and you may have more symptoms at certain times of the year.      Additional recommended treatment:  Warm compresses once to twice daily for 5-10 minutes    Directions for warm soaks  There are few methods for hot compresses. Moisten a washcloth with hot water, or microwave for 10 seconds, being careful to not get the cloth too hot.   Then put the washcloth onto your eyelids for 5 minutes. It will cool quickly so a rice pack or eyemask that can be heated and laid on top of the washcloth will help retain the heat.        You have the start of mild cataracts.  You may notice some blurred vision or glare with night driving.  It is important that you wear good sunglasses to protect your eyes from the ultraviolet light from the sun. I recommend that you return in 1 year for an eye exam unless there are any sudden changes in your vision.       Sanjuana was advised of today's exam findings.  Fill glasses prescription  Allow 2 weeks to adapt to change in glasses  Copy of glasses Rx provided today.  Return in 1 year for eye exam, or sooner if needed.    The effects of the dilating drops last for 4- 6 hours.  You will be more sensitive to light and vision will be blurry up close.  Mydriatic sunglasses were given if needed.    Trevor Cintron,  ELIAS  [facility]  [address] [place] [address]. [place]  [place]  [address]    [phone]

## 2019-07-09 ENCOUNTER — OFFICE VISIT (OUTPATIENT)
Dept: OPTOMETRY | Facility: CLINIC | Age: 64
End: 2019-07-09
Payer: COMMERCIAL

## 2019-07-09 DIAGNOSIS — H52.02 HYPERMETROPIA, LEFT: Primary | ICD-10-CM

## 2019-07-09 DIAGNOSIS — H52.4 PRESBYOPIA: ICD-10-CM

## 2019-07-09 PROCEDURE — 99207 ZZC NO BILLABLE SERVICE THIS VISIT: CPT | Performed by: OPTOMETRIST

## 2019-07-09 ASSESSMENT — VISUAL ACUITY
OS_CC: 20/20
OD_CC: 20/20
METHOD: SNELLEN - LINEAR
OD_CC: 20/20
OS_CC: 20/25
CORRECTION_TYPE: GLASSES

## 2019-07-09 ASSESSMENT — REFRACTION_MANIFEST
OS_CYLINDER: SPHERE
OS_ADD: +2.50
OS_SPHERE: +0.50

## 2019-07-09 ASSESSMENT — REFRACTION_WEARINGRX
OD_CYLINDER: +0.50
OS_CYLINDER: SPHERE
OS_ADD: +2.50
OS_SPHERE: +0.50
OD_AXIS: 177
OD_ADD: +2.50
OD_SPHERE: +0.25

## 2019-07-09 ASSESSMENT — CONF VISUAL FIELD
OS_NORMAL: 1
OD_NORMAL: 1

## 2019-07-09 ASSESSMENT — EXTERNAL EXAM - LEFT EYE: OS_EXAM: NORMAL

## 2019-07-09 ASSESSMENT — SLIT LAMP EXAM - LIDS: COMMENTS: 1+ DERMATOCHALASIS, 1+ MEIBOMIAN GLAND DYSFUNCTION

## 2019-07-09 NOTE — PATIENT INSTRUCTIONS
"Re-refracted left eye today. Exact same prescription resulted.   Glasses made correctly based on prescription.   Patient reports \"shadowing\" distortion out of left eye only when reading. Tested with trail frame of same prescription in-office today and reported no shadowing. Recommend re-making left lens, as there is possibly some aberration in the lens causing the distortion..       Trevor Cintron O.D.  [address]  [place]  [address]    [phone]    "

## 2019-07-09 NOTE — PROGRESS NOTES
"RX CHECK    Problems with glasses: patient is trouble with the bifocal in the left eye. Patient states the near vision is blurry in the left lens.  Glasses rx measure the same as rx given at last lobito Medrano    Optometry Tech       Objective: See Ophthalmology exam    Assessment:    ICD-10-CM    1. Hypermetropia, left H52.02    2. Presbyopia H52.4        Plan:  Re-refracted left eye today. Exact same prescription resulted.   Glasses made correctly based on prescription.   Patient reports \"shadowing\" distortion out of left eye only when reading. Tested with trail frame of same prescription in-office today and reported no shadowing. Recommend re-making left lens, as there is possibly some aberration in the lens causing the distortion..       Trevor Cintron O.D.  [address]  [place]  [address]    [phone]          "

## 2019-07-09 NOTE — LETTER
"    7/9/2019         RE: Sanjuana Salamanca  [address]  [place] [address]        Dear Colleague,    Thank you for referring your patient, Sanjuana Salamanca, to the [facility]. Please see a copy of my visit note below.    RX CHECK    Problems with glasses: patient is trouble with the bifocal in the left eye. Patient states the near vision is blurry in the left lens.  Glasses rx measure the same as rx given at last ce    Alisha Medrano    Optometry Tech       Objective: See Ophthalmology exam    Assessment:    ICD-10-CM    1. Hypermetropia, left H52.02    2. Presbyopia H52.4        Plan:  Re-refracted left eye today. Exact same prescription resulted.   Glasses made correctly based on prescription.   Patient reports \"shadowing\" distortion out of left eye only when reading. Tested with trail frame of same prescription in-office today and reported no shadowing. Recommend re-making left lens, as there is possibly some aberration in the lens causing the distortion..       Trevor Cintron O.D.  [facility]  [address]  [address]    [phone]            Again, thank you for allowing me to participate in the care of your patient.        Sincerely,        Trevor Cintron, OD    "

## 2019-07-17 ENCOUNTER — APPOINTMENT (OUTPATIENT)
Dept: OPTOMETRY | Facility: CLINIC | Age: 64
End: 2019-07-17
Payer: COMMERCIAL

## 2019-07-17 PROCEDURE — 92340 FIT SPECTACLES MONOFOCAL: CPT | Performed by: OPTOMETRIST

## 2019-07-24 ENCOUNTER — TELEPHONE (OUTPATIENT)
Dept: FAMILY MEDICINE | Facility: CLINIC | Age: 64
End: 2019-07-24

## 2019-07-24 NOTE — LETTER
July 24, 2019    Sanjuana Salamanca  [address]  [address]  [place] [address]    Dear Sanjuana    We care about your health and have reviewed your health plan. We have reviewed your medical conditions, medication list, and lab results and are making recommendations based on this review, to better manage your health.    You are in particular need of attention regarding:  - Scheduling a Breast Cancer Screening (Mammography) [phone]  - Scheduling a Physical with a Cervical Cancer Screening (Pap Smear) age 64 and younger [phone]      Here is a list of Health Maintenance topics that are due now or due soon:  Health Maintenance Due   Topic Date Due     ASTHMA ACTION PLAN  1955     HIV SCREENING  01/01/1970     ZOSTER IMMUNIZATION (1 of 2) 01/01/2005     PREVENTIVE CARE VISIT  05/14/2015     PAP  05/15/2017     MAMMO SCREENING  04/21/2018     ADVANCE CARE PLANNING  05/23/2019       Please call us at [phone] (or use HeyKiki) to address the above recommendations. If we do not hear from you in the next couple of weeks we will be reaching out to you again.    Thank you for trusting [facility] and we appreciate the opportunity to serve you.  We look forward to supporting your healthcare needs in the future.    Healthy Regards,    ALG/ML

## 2019-07-24 NOTE — LETTER
August 30, 2019    Sanjuana Salamanca  [address]  [address]  [place] [address]      Dear Sanjuana Salamanca,     We have tried to contact you about your health, but have been unable to reach you.  Please call us as soon as possible so we can provide you with the best care possible.  We will continue to check in with you throughout the year to complete these items of care, if you are not able to complete these items at this time.  If you would like to complete the missing items for your care, please contact us at [phone].    We recommend the following:  -schedule a MAMMOGRAM 1 in 8 women will develop invasive breast cancer during her lifetime and it is the most common non-skin cancer in American women.  EARLY detection, new treatments, and a better understanding of the disease have increased survival rates - the 5 year survival rate in the 1960s was 63% and today it is close to 90% .  Please disregard this reminder if you have had this exam elsewhere within the last year.  It would be helpful for us to have a copy of your mammogram report in our file so that we can best coordinate your care - please contact us with when your test was done so we can update your record. Please call [phone] to schedule your mammogram today.   -schedule a PAP SMEAR EXAM which is due.  Please disregard this reminder if you have had this exam elsewhere within the last year.  It would be helpful for us to have a copy of your recent pap smear report in our file so that we can best coordinate your care.  -schedule a PHYSICAL with your provider.        Sincerely,     Your Care Team at [place]    ABDULLAHI/

## 2019-07-24 NOTE — TELEPHONE ENCOUNTER
Panel Management Review      Patient has the following on her problem list:     Hypertension   Last three blood pressure readings:  BP Readings from Last 3 Encounters:   05/14/19 129/83   04/18/19 (!) 148/94   01/07/19 135/85     Blood pressure: MONITOR    HTN Guidelines:  Less than 140/90      Composite cancer screening  Chart review shows that this patient is due/due soon for the following Pap Smear and Mammogram  Summary:    Patient is due/failing the following:   MAMMOGRAM, PAP and PHYSICAL    Action needed:   Patient needs office visit for Physical, Pap, and Mammo.    Type of outreach:    Sent letter.    Questions for provider review:    None                                                                                                                                    TIEN Beverly MA       Chart routed to Care Team .

## 2019-07-26 DIAGNOSIS — M17.0 PRIMARY OSTEOARTHRITIS OF BOTH KNEES: ICD-10-CM

## 2019-07-26 RX ORDER — ACETAMINOPHEN 325 MG/1
325-650 TABLET ORAL EVERY 6 HOURS PRN
Qty: 100 TABLET | Refills: 3 | Status: SHIPPED | OUTPATIENT
Start: 2019-07-26 | End: 2021-01-22

## 2019-07-26 NOTE — TELEPHONE ENCOUNTER
Prescription approved per [facility] Refill Protocol.      Kandis Rodney RN  [facility]

## 2019-07-26 NOTE — TELEPHONE ENCOUNTER
Reason for Call:  Medication or medication refill:    Do you use a [facility]?  Name of the pharmacy and phone number for the current request:  [id number] [facility] [address]    Name of the medication requested:   acetaminophen (TYLENOL)    Other request: No    Can we leave a detailed message on this number? YES    Phone number patient can be reached at: [phone]    Best Time: Anytime    Call taken on 7/26/2019 at 2:06 PM by Jackie Sutherland

## 2019-09-04 DIAGNOSIS — E55.9 AVITAMINOSIS D: ICD-10-CM

## 2019-09-04 NOTE — TELEPHONE ENCOUNTER
"Requested Prescriptions   Pending Prescriptions Disp Refills     vitamin D3 (VITAMIN D3) 1000 units (25 mcg) tablet 100 tablet 3     Sig: Take 1 tablet (1,000 Units) by mouth daily   Last Written Prescription Date:  8-27-18  Last Fill Quantity: 100,  # refills: 3   Last office visit: 5/14/2019 with prescribing provider:  5-14-19   Future Office Visit:        Vitamin Supplements (Adult) Protocol Passed - 9/4/2019  8:52 AM        Passed - High dose Vitamin D not ordered        Passed - Recent (12 mo) or future (30 days) visit within the authorizing provider's specialty     Patient had office visit in the last 12 months or has a visit in the next 30 days with authorizing provider or within the authorizing provider's specialty.  See \"Patient Info\" tab in inbasket, or \"Choose Columns\" in Meds & Orders section of the refill encounter.              Passed - Medication is active on med list          "

## 2019-09-06 NOTE — TELEPHONE ENCOUNTER
Prescription approved per [facility] Refill Protocol.    Kim Reardon RN  [facility]

## 2019-10-01 ENCOUNTER — OFFICE VISIT (OUTPATIENT)
Dept: FAMILY MEDICINE | Facility: CLINIC | Age: 64
End: 2019-10-01
Payer: COMMERCIAL

## 2019-10-01 VITALS
SYSTOLIC BLOOD PRESSURE: 133 MMHG | BODY MASS INDEX: 30.57 KG/M2 | TEMPERATURE: 98.1 F | OXYGEN SATURATION: 97 % | WEIGHT: 172.6 LBS | HEART RATE: 90 BPM | DIASTOLIC BLOOD PRESSURE: 87 MMHG

## 2019-10-01 DIAGNOSIS — M25.511 ACUTE PAIN OF RIGHT SHOULDER: Primary | ICD-10-CM

## 2019-10-01 DIAGNOSIS — Z12.11 SPECIAL SCREENING FOR MALIGNANT NEOPLASMS, COLON: ICD-10-CM

## 2019-10-01 DIAGNOSIS — R05.3 CHRONIC COUGH: ICD-10-CM

## 2019-10-01 DIAGNOSIS — K59.00 CONSTIPATION, UNSPECIFIED CONSTIPATION TYPE: ICD-10-CM

## 2019-10-01 DIAGNOSIS — I10 HYPERTENSION GOAL BP (BLOOD PRESSURE) < 140/90: ICD-10-CM

## 2019-10-01 DIAGNOSIS — M17.9 OSTEOARTHRITIS OF KNEE, UNSPECIFIED LATERALITY, UNSPECIFIED OSTEOARTHRITIS TYPE: ICD-10-CM

## 2019-10-01 PROCEDURE — 99213 OFFICE O/P EST LOW 20 MIN: CPT | Performed by: PHYSICIAN ASSISTANT

## 2019-10-01 NOTE — PROGRESS NOTES
Subjective     Sanjuana Salamanca is a 64 year old female who presents to clinic today for the following health issues:    HPI   Hypertension Follow-up      Do you check your blood pressure regularly outside of the clinic? Yes     Are you following a low salt diet? Yes    Are your blood pressures ever more than 140 on the top number (systolic) OR more   than 90 on the bottom number (diastolic), for example 140/90? Yes      How many servings of fruits and vegetables do you eat daily?  Unsure    On average, how many sweetened beverages do you drink each day (soda, juice, sweet tea, etc)?   2    How many days per week do you miss taking your medication? 0    Right shoulder pain also. Pt states she fell a few years ago but 1 week ago started hurting.    10 years ago was in [place] and fell down stairs. She hit the shoulder at that time. Last week was in [place] she reached with the right arm and she had immediate pain. She notes up and down are good but side to side is painful. No numbness/tingling.     Has been taking tylenol and diclofenac which is not helpful. Patient is right hand dominant       Of note patient had stopped her hydrochlorothiazide, not understanding she should be taking all 3 BP medications.         Reviewed and updated as needed this visit by Provider  Tobacco  Allergies  Meds  Problems  Med Hx  Surg Hx  Fam Hx         Review of Systems   ROS COMP: Constitutional, HEENT, cardiovascular, pulmonary, gi and gu systems are negative, except as otherwise noted.      Objective    /87 (BP Location: Right arm, Patient Position: Chair, Cuff Size: Adult Regular)   Pulse 90   Temp 98.1  F (36.7  C) (Oral)   Wt 78.3 kg (172 lb 9.6 oz)   SpO2 97%   BMI 30.57 kg/m    Body mass index is 30.57 kg/m .  Physical Exam   GENERAL: healthy, alert and no distress  MS: no gross musculoskeletal defects noted, no edema- full range of motion actively and passively. No pain with palpation of the right shoulder. Normal   strength. deep tendon reflexes intact.   SKIN: no suspicious lesions or rashes  NEURO: Normal strength and tone, mentation intact and speech normal    Diagnostic Test Results:  none         Assessment & Plan       ICD-10-CM    1. Acute pain of right shoulder M25.511 MANSOOR PT, HAND, AND CHIROPRACTIC REFERRAL   2. Osteoarthritis of knee, unspecified laterality, unspecified osteoarthritis type M17.10 diclofenac (VOLTAREN) 50 MG EC tablet   3. Special screening for malignant neoplasms, colon Z12.11 Fecal colorectal cancer screen (FIT)   Try physical therapy and diclofenac.   Encouraged FIT testing.          Return in about 4 weeks (around 10/29/2019) for shoulder pain. .    Drea Denise PA-C  [facility]

## 2019-10-01 NOTE — PATIENT INSTRUCTIONS
Please make sure you are taking all 3 BP medications.     1. You can call and make and appointment with the physical therapist.   2. Take the diclofenac 3 times a day plus tylenol at night if needed for the pain.

## 2019-10-02 NOTE — TELEPHONE ENCOUNTER
"Requested Prescriptions   Pending Prescriptions Disp Refills     polyethylene glycol (MIRALAX/GLYCOLAX) powder [Pharmacy Med Name: POLYETH GLYCOL 3350 NF POWDER 510GM] 527 g 0     Sig: MIX 17 GRAMS(1 CAPFUL) INTO LIQUID AND DRINK BY MOUTH EVERY DAY   Last Written Prescription Date:  8-27-18  Last Fill Quantity: 510g,  # refills: 11   Last office visit: 10/1/2019 with prescribing provider:  10-1-19   Future Office Visit:        Laxatives Protocol Passed - 10/1/2019  4:55 PM        Passed - Patient is age 6 or older        Passed - Recent (12 mo) or future (30 days) visit within the authorizing provider's specialty     Patient has had an office visit with the authorizing provider or a provider within the authorizing providers department within the previous 12 mos or has a future within next 30 days. See \"Patient Info\" tab in inbasket, or \"Choose Columns\" in Meds & Orders section of the refill encounter.              Passed - Medication is active on med list        omeprazole (PRILOSEC) 20 MG DR capsule [Pharmacy Med Name: OMEPRAZOLE 20MG CAPSULES] 180 capsule 0     Sig: TAKE 1 CAPSULE(20 MG) BY MOUTH TWICE DAILY   Last Written Prescription Date:  9-24-18  Last Fill Quantity: 180,  # refills: 1   Last office visit: 10/1/2019 with prescribing provider:     Future Office Visit:        PPI Protocol Passed - 10/1/2019  4:55 PM        Passed - Not on Clopidogrel (unless Pantoprazole ordered)        Passed - No diagnosis of osteoporosis on record        Passed - Recent (12 mo) or future (30 days) visit within the authorizing provider's specialty     Patient has had an office visit with the authorizing provider or a provider within the authorizing providers department within the previous 12 mos or has a future within next 30 days. See \"Patient Info\" tab in inbasket, or \"Choose Columns\" in Meds & Orders section of the refill encounter.              Passed - Medication is active on med list        Passed - Patient is age 18 or " "older        Passed - No active pregnacy on record        Passed - No positive pregnancy test in past 12 months        metoprolol tartrate (LOPRESSOR) 25 MG tablet [Pharmacy Med Name: METOPROLOL TARTRATE 25MG TABLETS] 180 tablet 0     Sig: TAKE 1 TABLET(25 MG) BY MOUTH TWICE DAILY   Last Written Prescription Date:  8-27-18  Last Fill Quantity: 180,  # refills: 3   Last office visit: 10/1/2019 with prescribing provider:     Future Office Visit:        Beta-Blockers Protocol Passed - 10/1/2019  4:55 PM        Passed - Blood pressure under 140/90 in past 12 months     BP Readings from Last 3 Encounters:   10/01/19 133/87   05/14/19 129/83   04/18/19 (!) 148/94                 Passed - Patient is age 6 or older        Passed - Recent (12 mo) or future (30 days) visit within the authorizing provider's specialty     Patient has had an office visit with the authorizing provider or a provider within the authorizing providers department within the previous 12 mos or has a future within next 30 days. See \"Patient Info\" tab in inbasket, or \"Choose Columns\" in Meds & Orders section of the refill encounter.              Passed - Medication is active on med list          "

## 2019-10-03 RX ORDER — METOPROLOL TARTRATE 25 MG/1
TABLET, FILM COATED ORAL
Qty: 180 TABLET | Refills: 0 | Status: SHIPPED | OUTPATIENT
Start: 2019-10-03 | End: 2020-10-12

## 2019-10-03 RX ORDER — POLYETHYLENE GLYCOL 3350 17 G/17G
POWDER, FOR SOLUTION ORAL
Qty: 527 G | Refills: 0 | Status: SHIPPED | OUTPATIENT
Start: 2019-10-03 | End: 2020-01-30

## 2019-10-03 NOTE — TELEPHONE ENCOUNTER
Prescriptions approved per [facility] Refill Protocol.    Kim Reardon RN  [facility]

## 2019-10-14 ENCOUNTER — TELEPHONE (OUTPATIENT)
Dept: FAMILY MEDICINE | Facility: CLINIC | Age: 64
End: 2019-10-14

## 2019-10-14 NOTE — TELEPHONE ENCOUNTER
Prior Authorization Retail Medication Request    Medication/Dose: Vitamin D 1,000 UNIT Tablets  ICD code (if different than what is on RX):    Previously Tried and Failed:    Rationale:      Insurance Name:  [facility]  Insurance ID:  [id number]      Pharmacy Information (if different than what is on RX)  Name:  [facility]  Phone:  [phone]    TIEN Beverly MA

## 2019-10-15 NOTE — TELEPHONE ENCOUNTER
Central Prior Authorization Team   Phone: [phone]      PA NOT NEEDED    Medication: Vitamin D 1,000 UNIT Tablets  Insurance Company: [employer] - Phone [phone] Fax [phone]  Pharmacy Filling the Rx: [id number] [place], [place] - [address] AT [facility] [address]  Filling Pharmacy Phone: [phone]  Filling Pharmacy Fax:    Start Date: 10/15/2019    Started PA on CMM and a response of Drug is covered by current benefit plan. No further PA activity needed.  Called pharmacy and they were able to switch the  and get a paid claim.  Pharmacy will notify patient when medication is ready.

## 2019-10-16 ENCOUNTER — THERAPY VISIT (OUTPATIENT)
Dept: PHYSICAL THERAPY | Facility: CLINIC | Age: 64
End: 2019-10-16
Attending: PHYSICIAN ASSISTANT
Payer: COMMERCIAL

## 2019-10-16 DIAGNOSIS — M25.511 ACUTE PAIN OF RIGHT SHOULDER: ICD-10-CM

## 2019-10-16 DIAGNOSIS — M25.511 RIGHT SHOULDER PAIN: ICD-10-CM

## 2019-10-16 PROCEDURE — 97110 THERAPEUTIC EXERCISES: CPT | Mod: GP | Performed by: PHYSICAL THERAPIST

## 2019-10-16 PROCEDURE — 97161 PT EVAL LOW COMPLEX 20 MIN: CPT | Mod: GP | Performed by: PHYSICAL THERAPIST

## 2019-10-16 NOTE — PROGRESS NOTES
[facility] Initial Evaluation  Subjective:  The history is provided by the patient. No  was used.   Type of problem:  Right shoulder   Occurance: reaching overhead. This is a new condition   Problem details: Acute Pain of Right shoulder 9/23/2019. Pt reports she notes that her pain goes away with medications. Pt reports her pain is intermittent. Pt reports pain when attempting to reach overhead, or reaching across the body, also when washing her hair the pain increases. Pt reports sleeping on her side increases the pain. Pt reports the pain in the shoulders does not wake her up. 6-7/10 pain today in B shoulders. Pt reports intermittent sharp pain in the right shoulder >L, both anterior pain that sometimes goes into the upper arm. Pmhx: high BP. Pt reports no medical allergies and previous surgery 20 plus years ago.unsure. Pt reports no employment. No red flags identified this session.  .           Pt reports she is right handed, notes the pain may have started when she threw keys to her sister while visiting [place] at the end of September. Pt reports overall it has gotten worse since now she also has pain in the front of her left shoulder that is also worse with reaching overhead.                      Objective:  System                     Shoulder Evaluation:  ROM:  AROM:    Flexion:  Left:  150    Right:  120    Abduction:  Left: 150   Right:  150    Internal Rotation:  Left:  T6    Right:  T8  External Rotation:  Left:  50    Right:  50                  Pain: pain with flexion, IR RUE>LUE.    Strength:    Flexion: Left:3+/5 Strong/painful    Pain:    Right: 3+/5  Strong/painful     Pain:     Abduction:  Left: 3+/5  Strong/painful  Pain:    Right: 3+/5   Strong/painful    Pain:    Internal Rotation:  Left:4+/5     Pain:    Right: 4+/5     Pain:  External Rotation:   Left:4-/5   Strong/pain free    Pain:   Right:3-/5   Strong/painful    Pain:                                                      General     ROS    Assessment/Plan:    Patient is a 64 year old female with right side shoulder complaints.    Patient has the following significant findings with corresponding treatment plan.                Diagnosis 1:  B Shoulder Pain  Pain -  hot/cold therapy, manual therapy, self management, education and home program  Decreased ROM/flexibility - manual therapy and therapeutic exercise  Decreased strength - therapeutic exercise and therapeutic activities  Impaired posture - neuro re-education        Previous and current functional limitations:  (See Goal Flow Sheet for this information)    Short term and Long term goals: (See Goal Flow Sheet for this information)     Communication ability:  Patient appears to be able to clearly communicate and understand verbal and written communication and follow directions correctly.  Treatment Explanation - The following has been discussed with the patient:   RX ordered/plan of care  Anticipated outcomes  Possible risks and side effects  This patient would benefit from PT intervention to resume normal activities.   Rehab potential is good.    Frequency:  1 X week, once daily  Duration:  for 6 weeks  Discharge Plan:  Achieve all LTG.  Independent in home treatment program.  Reach maximal therapeutic benefit.    Please refer to the daily flowsheet for treatment today, total treatment time and time spent performing 1:1 timed codes.

## 2019-10-24 ENCOUNTER — THERAPY VISIT (OUTPATIENT)
Dept: PHYSICAL THERAPY | Facility: CLINIC | Age: 64
End: 2019-10-24
Payer: COMMERCIAL

## 2019-10-24 DIAGNOSIS — M25.511 RIGHT SHOULDER PAIN: ICD-10-CM

## 2019-10-24 DIAGNOSIS — Z12.11 SPECIAL SCREENING FOR MALIGNANT NEOPLASMS, COLON: ICD-10-CM

## 2019-10-24 LAB — HEMOCCULT STL QL IA: NEGATIVE

## 2019-10-24 PROCEDURE — 82274 ASSAY TEST FOR BLOOD FECAL: CPT | Performed by: PHYSICIAN ASSISTANT

## 2019-10-24 PROCEDURE — 97112 NEUROMUSCULAR REEDUCATION: CPT | Mod: GP | Performed by: PHYSICAL THERAPIST

## 2019-10-24 PROCEDURE — 97110 THERAPEUTIC EXERCISES: CPT | Mod: GP | Performed by: PHYSICAL THERAPIST

## 2019-10-24 NOTE — LETTER
[facility]   [address] [place]  [address]  [phone]                                   October 25, 2019    Sanjuana Salamanca  [address]    [place] [address]        Dear Sanjuana,    Your colon cancer screening test was negative. We will repeat this in 1 year.     Results for orders placed or performed in visit on 10/24/19   Fecal colorectal cancer screen (FIT)   Result Value Ref Range    Occult Blood Scn FIT Negative NEG^Negative       If you have any questions please call the clinic at [phone]    Sincerely,    Drea Denise PA-C  [facility]

## 2019-11-06 DIAGNOSIS — R05.3 CHRONIC COUGH: ICD-10-CM

## 2019-11-06 NOTE — TELEPHONE ENCOUNTER
"Requested Prescriptions   Pending Prescriptions Disp Refills     omeprazole (PRILOSEC) 20 MG DR capsule [Pharmacy Med Name: OMEPRAZOLE 20MG CAPSULES] 180 capsule 0     Sig: TAKE 1 CAPSULE(20 MG) BY MOUTH TWICE DAILY   Last Written Prescription Date:  10/3/19  Last Fill Quantity: 180,  # refills: 0   Last office visit: 10/1/2019 with prescribing provider:     Future Office Visit:        PPI Protocol Passed - 11/6/2019  5:17 PM        Passed - Not on Clopidogrel (unless Pantoprazole ordered)        Passed - No diagnosis of osteoporosis on record        Passed - Recent (12 mo) or future (30 days) visit within the authorizing provider's specialty     Patient has had an office visit with the authorizing provider or a provider within the authorizing providers department within the previous 12 mos or has a future within next 30 days. See \"Patient Info\" tab in inbasket, or \"Choose Columns\" in Meds & Orders section of the refill encounter.              Passed - Medication is active on med list        Passed - Patient is age 18 or older        Passed - No active pregnacy on record        Passed - No positive pregnancy test in past 12 months          "

## 2019-11-07 NOTE — TELEPHONE ENCOUNTER
Prescription approved per [facility] Refill Protocol.    Ayah Dexter, RN, BSN, PHN  [facility] [place]

## 2019-11-12 ENCOUNTER — THERAPY VISIT (OUTPATIENT)
Dept: PHYSICAL THERAPY | Facility: CLINIC | Age: 64
End: 2019-11-12
Payer: COMMERCIAL

## 2019-11-12 DIAGNOSIS — M25.511 RIGHT SHOULDER PAIN: ICD-10-CM

## 2019-11-12 PROCEDURE — 97112 NEUROMUSCULAR REEDUCATION: CPT | Mod: GP | Performed by: PHYSICAL THERAPIST

## 2019-11-12 PROCEDURE — 97110 THERAPEUTIC EXERCISES: CPT | Mod: GP | Performed by: PHYSICAL THERAPIST

## 2019-11-19 ENCOUNTER — THERAPY VISIT (OUTPATIENT)
Dept: PHYSICAL THERAPY | Facility: CLINIC | Age: 64
End: 2019-11-19
Payer: COMMERCIAL

## 2019-11-19 DIAGNOSIS — M25.511 RIGHT SHOULDER PAIN: ICD-10-CM

## 2019-11-19 PROCEDURE — 97112 NEUROMUSCULAR REEDUCATION: CPT | Mod: GP | Performed by: PHYSICAL THERAPIST

## 2019-11-19 PROCEDURE — 97110 THERAPEUTIC EXERCISES: CPT | Mod: GP | Performed by: PHYSICAL THERAPIST

## 2019-12-02 ENCOUNTER — OFFICE VISIT (OUTPATIENT)
Dept: FAMILY MEDICINE | Facility: CLINIC | Age: 64
End: 2019-12-02
Payer: COMMERCIAL

## 2019-12-02 VITALS
DIASTOLIC BLOOD PRESSURE: 68 MMHG | HEART RATE: 91 BPM | BODY MASS INDEX: 30.29 KG/M2 | WEIGHT: 171 LBS | OXYGEN SATURATION: 94 % | SYSTOLIC BLOOD PRESSURE: 119 MMHG | TEMPERATURE: 98.1 F

## 2019-12-02 DIAGNOSIS — J45.901 REACTIVE AIRWAY DISEASE WITH ACUTE EXACERBATION, UNSPECIFIED ASTHMA SEVERITY, UNSPECIFIED WHETHER PERSISTENT: Primary | ICD-10-CM

## 2019-12-02 DIAGNOSIS — R05.9 COUGH: ICD-10-CM

## 2019-12-02 DIAGNOSIS — R05.3 CHRONIC COUGH: ICD-10-CM

## 2019-12-02 PROCEDURE — 99214 OFFICE O/P EST MOD 30 MIN: CPT | Performed by: FAMILY MEDICINE

## 2019-12-02 RX ORDER — ALBUTEROL SULFATE 90 UG/1
2 AEROSOL, METERED RESPIRATORY (INHALATION) EVERY 6 HOURS PRN
Qty: 1 INHALER | Refills: 11 | Status: SHIPPED | OUTPATIENT
Start: 2019-12-02 | End: 2021-05-04

## 2019-12-02 RX ORDER — PREDNISONE 20 MG/1
TABLET ORAL
Qty: 10 TABLET | Refills: 0 | Status: SHIPPED | OUTPATIENT
Start: 2019-12-02 | End: 2020-10-12

## 2019-12-02 RX ORDER — AZITHROMYCIN 250 MG/1
TABLET, FILM COATED ORAL
Qty: 6 TABLET | Refills: 0 | Status: SHIPPED | OUTPATIENT
Start: 2019-12-02 | End: 2019-12-07

## 2019-12-02 RX ORDER — FLUTICASONE PROPIONATE 110 UG/1
2 AEROSOL, METERED RESPIRATORY (INHALATION) 2 TIMES DAILY
Qty: 1 INHALER | Refills: 1 | Status: SHIPPED | OUTPATIENT
Start: 2019-12-02 | End: 2020-10-12

## 2019-12-02 RX ORDER — BENZONATATE 200 MG/1
200 CAPSULE ORAL 3 TIMES DAILY PRN
Qty: 30 CAPSULE | Refills: 0 | Status: SHIPPED | OUTPATIENT
Start: 2019-12-02 | End: 2019-12-12

## 2019-12-02 ASSESSMENT — ASTHMA QUESTIONNAIRES
QUESTION_1 LAST FOUR WEEKS HOW MUCH OF THE TIME DID YOUR ASTHMA KEEP YOU FROM GETTING AS MUCH DONE AT WORK, SCHOOL OR AT HOME: SOME OF THE TIME
QUESTION_2 LAST FOUR WEEKS HOW OFTEN HAVE YOU HAD SHORTNESS OF BREATH: NOT AT ALL
QUESTION_3 LAST FOUR WEEKS HOW OFTEN DID YOUR ASTHMA SYMPTOMS (WHEEZING, COUGHING, SHORTNESS OF BREATH, CHEST TIGHTNESS OR PAIN) WAKE YOU UP AT NIGHT OR EARLIER THAN USUAL IN THE MORNING: FOUR OR MORE NIGHTS A WEEK
QUESTION_4 LAST FOUR WEEKS HOW OFTEN HAVE YOU USED YOUR RESCUE INHALER OR NEBULIZER MEDICATION (SUCH AS ALBUTEROL): NOT AT ALL
ACT_TOTALSCORE: 17
QUESTION_5 LAST FOUR WEEKS HOW WOULD YOU RATE YOUR ASTHMA CONTROL: SOMEWHAT CONTROLLED

## 2019-12-02 ASSESSMENT — PAIN SCALES - GENERAL: PAINLEVEL: NO PAIN (0)

## 2019-12-02 NOTE — LETTER
My Asthma Action Plan    Name: Sanjuana Salamanca   YOB: 1955  Date: 12/2/2019   My doctor: Shawn Duong MD   My clinic: [facility]        My Rescue Medicine:   Albuterol inhaler (Proair/Ventolin/Proventil HFA)  2-4 puffs EVERY 4 HOURS as needed. Use a spacer if recommended by your provider.   My Asthma Severity:   Intermittent / Exercise Induced  Know your asthma triggers: cold air             GREEN ZONE   Good Control    I feel good    No cough or wheeze    Can work, sleep and play without asthma symptoms       Take your asthma control medicine every day.     1. If exercise triggers your asthma, take your rescue medication    15 minutes before exercise or sports, and    During exercise if you have asthma symptoms  2. Spacer to use with inhaler: If you have a spacer, make sure to use it with your inhaler             YELLOW ZONE Getting Worse  I have ANY of these:    I do not feel good    Cough or wheeze    Chest feels tight    Wake up at night   1. Keep taking your Green Zone medications  2. Start taking your rescue medicine:    every 20 minutes for up to 1 hour. Then every 4 hours for 24-48 hours.  3. If you stay in the Yellow Zone for more than 12-24 hours, contact your doctor.  4. If you do not return to the Green Zone in 12-24 hours or you get worse, start taking your oral steroid medicine if prescribed by your provider.           RED ZONE Medical Alert - Get Help  I have ANY of these:    I feel awful    Medicine is not helping    Breathing getting harder    Trouble walking or talking    Nose opens wide to breathe       1. Take your rescue medicine NOW  2. If your provider has prescribed an oral steroid medicine, start taking it NOW  3. Call your doctor NOW  4. If you are still in the Red Zone after 20 minutes and you have not reached your doctor:    Take your rescue medicine again and    Call 911 or go to the emergency room right away    See your regular doctor within 2  weeks of an Emergency Room or Urgent Care visit for follow-up treatment.          Annual Reminders:  Meet with Asthma Educator,  Flu Shot in the Fall, consider Pneumonia Vaccination for patients with asthma (aged 19 and older).    [facility]    [id number] - [place], [place] - [address] AT [facility] [address] & [id number] - [place], [place] - [address] AT [facility] [address]                        Asthma Triggers  How To Control Things That Make Your Asthma Worse    Triggers are things that make your asthma worse.  Look at the list below to help you find your triggers and   what you can do about them. You can help prevent asthma flare-ups by staying away from your triggers.      Trigger                                                          What you can do   Cigarette Smoke  Tobacco smoke can make asthma worse. Do not allow smoking in your home, car or around you.  Be sure no one smokes at a child s day care or school.  If you smoke, ask your health care provider for ways to help you quit.  Ask family members to quit too.  Ask your health care provider for a referral to Quit Plan to help you quit smoking, or call 3-252-320-PLAN.     Colds, Flu, Bronchitis  These are common triggers of asthma. Wash your hands often.  Don t touch your eyes, nose or mouth.  Get a flu shot every year.     Dust Mites  These are tiny bugs that live in cloth or carpet. They are too small to see. Wash sheets and blankets in hot water every week.   Encase pillows and mattress in dust mite proof covers.  Avoid having carpet if you can. If you have carpet, vacuum weekly.   Use a dust mask and HEPA vacuum.   Pollen and Outdoor Mold  Some people are allergic to trees, grass, or weed pollen, or molds. Try to keep your windows closed.  Limit time out doors when pollen count is high.   Ask you health care provider about taking medicine during allergy season.     Animal Dander  Some people are allergic to  skin flakes, urine or saliva from pets with fur or feathers. Keep pets with fur or feathers out of your home.    If you can t keep the pet outdoors, then keep the pet out of your bedroom.  Keep the bedroom door closed.  Keep pets off cloth furniture and away from stuffed toys.     Mice, Rats, and Cockroaches  Some people are allergic to the waste from these pests.   Cover food and garbage.  Clean up spills and food crumbs.  Store grease in the refrigerator.   Keep food out of the bedroom.   Indoor Mold  This can be a trigger if your home has high moisture. Fix leaking faucets, pipes, or other sources of water.   Clean moldy surfaces.  Dehumidify basement if it is damp and smelly.   Smoke, Strong Odors, and Sprays  These can reduce air quality. Stay away from strong odors and sprays, such as perfume, powder, hair spray, paints, smoke incense, paint, cleaning products, candles and new carpet.   Exercise or Sports  Some people with asthma have this trigger. Be active!  Ask your doctor about taking medicine before sports or exercise to prevent symptoms.    Warm up for 5-10 minutes before and after sports or exercise.     Other Triggers of Asthma  Cold air:  Cover your nose and mouth with a scarf.  Sometimes laughing or crying can be a trigger.  Some medicines and food can trigger asthma.

## 2019-12-02 NOTE — PROGRESS NOTES
Subjective     Sanjuana Salamanca is a 64 year old female who presents to clinic today for the following health issues:    HPI :  Patient comes in today with cough, for the past 3 weeks.  Sinus congestion, postnasal drainage.  She had reactive airway disease, underlying asthma she reports she has been out of her medication.    She denies any fever, denies chills, she has no sick contact no history of travel.  Denies lower extremity edema.  She denies sore throat.  No abdominal pain nausea or vomiting.    Acute Illness   Acute illness concerns: Cough  Onset: Three weeks    Fever: no    Chills/Sweats: no    Headache (location?): YES- sometimes    Sinus Pressure:no    Conjunctivitis:  no    Ear Pain: no    Rhinorrhea: YES    Congestion: YES    Sore Throat: no     Cough: YES-productive of green sputum    Wheeze: YES    Decreased Appetite: no    Nausea: no    Vomiting: no    Diarrhea:  no    Dysuria/Freq.: no    Fatigue/Achiness: YES- fatigue    Sick/Strep Exposure: no     Therapies Tried and outcome: Home remedies; somewhat helpful      Patient Active Problem List   Diagnosis     Avitaminosis D     Esophageal reflux     OA (osteoarthritis) of knee     Advanced directives, counseling/discussion     Hyperlipidemia LDL goal <160     Cataracts, both eyes     Dry eyes     Hypokalemia     Prediabetes     Essential hypertension with goal blood pressure less than 140/90     Right shoulder pain     History reviewed. No pertinent surgical history.    Social History     Tobacco Use     Smoking status: Never Smoker     Smokeless tobacco: Never Used   Substance Use Topics     Alcohol use: No     Family History   Problem Relation Age of Onset     Hypertension Mother      Thyroid Disease Mother      Kidney Disease Mother 80        on dialysis     Diabetes Other      Cerebrovascular Disease Other      Glaucoma No family hx of      Macular Degeneration No family hx of      Cancer No family hx of          Current Outpatient Medications    Medication Sig Dispense Refill     acetaminophen (TYLENOL) 325 MG tablet Take 1-2 tablets (325-650 mg) by mouth every 6 hours as needed for mild pain 100 tablet 3     albuterol (PROAIR HFA/PROVENTIL HFA/VENTOLIN HFA) 108 (90 Base) MCG/ACT inhaler Inhale 2 puffs into the lungs every 6 hours as needed for shortness of breath / dyspnea or wheezing 1 Inhaler 11     amLODIPine (NORVASC) 10 MG tablet Take 1 tablet (10 mg) by mouth daily 90 tablet 3     azithromycin (ZITHROMAX) 250 MG tablet Take 2 tablets (500 mg) by mouth daily for 1 day, THEN 1 tablet (250 mg) daily for 4 days. 6 tablet 0     benzonatate (TESSALON) 200 MG capsule Take 1 capsule (200 mg) by mouth 3 times daily as needed for cough 30 capsule 0     diclofenac (VOLTAREN) 50 MG EC tablet TAKE 1 TABLET(50 MG) BY MOUTH THREE TIMES DAILY AS NEEDED FOR MODERATE PAIN 90 tablet 5     fluticasone (FLOVENT HFA) 110 MCG/ACT inhaler Inhale 2 puffs into the lungs 2 times daily 1 Inhaler 1     hydrochlorothiazide (HYDRODIURIL) 25 MG tablet Take 1 tablet (25 mg) by mouth daily 90 tablet 3     metoprolol tartrate (LOPRESSOR) 25 MG tablet TAKE 1 TABLET(25 MG) BY MOUTH TWICE DAILY 180 tablet 0     omeprazole (PRILOSEC) 20 MG DR capsule TAKE 1 CAPSULE(20 MG) BY MOUTH TWICE DAILY 180 capsule 0     polyethylene glycol (MIRALAX/GLYCOLAX) powder MIX 17 GRAMS(1 CAPFUL) INTO LIQUID AND DRINK BY MOUTH EVERY  g 0     predniSONE (DELTASONE) 20 MG tablet 2 tab daily for 5 days 10 tablet 0     vitamin D3 (VITAMIN D3) 1000 units (25 mcg) tablet Take 1 tablet (1,000 Units) by mouth daily 100 tablet 3     cetirizine (ZYRTEC) 10 MG tablet Take 1 tablet (10 mg) by mouth daily (Patient not taking: Reported on 10/1/2019) 90 tablet 3     Allergies   Allergen Reactions     Valsartan Cough       Reviewed and updated as needed this visit by Provider         Review of Systems   ROS COMP: Constitutional, HEENT, cardiovascular, pulmonary, gi and gu systems are negative, except as otherwise  noted.      Objective    /68 (BP Location: Left arm, Patient Position: Chair, Cuff Size: Adult Large)   Pulse 91   Temp 98.1  F (36.7  C) (Oral)   Wt 77.6 kg (171 lb)   SpO2 94%   Breastfeeding No   BMI 30.29 kg/m    Body mass index is 30.29 kg/m .  Physical Exam   GENERAL: healthy, alert and no distress  RESP: lungs clear to auscultation - no rales, rhonchi or wheezes and inspiratory wheezes throughout  CV: regular rate and rhythm, normal S1 S2, no S3 or S4, no murmur, click or rub, no peripheral edema and peripheral pulses strong  ABDOMEN: soft, nontender, no hepatosplenomegaly, no masses and bowel sounds normal  MS: no gross musculoskeletal defects noted, no edema    Diagnostic Test Results:  Labs reviewed in Epic  none         Assessment & Plan       ICD-10-CM    1. Reactive airway disease with acute exacerbation, unspecified asthma severity, unspecified whether persistent J45.901 albuterol (PROAIR HFA/PROVENTIL HFA/VENTOLIN HFA) 108 (90 Base) MCG/ACT inhaler     fluticasone (FLOVENT HFA) 110 MCG/ACT inhaler     predniSONE (DELTASONE) 20 MG tablet     azithromycin (ZITHROMAX) 250 MG tablet   2. Chronic cough R05 albuterol (PROAIR HFA/PROVENTIL HFA/VENTOLIN HFA) 108 (90 Base) MCG/ACT inhaler   3. Cough R05 fluticasone (FLOVENT HFA) 110 MCG/ACT inhaler     benzonatate (TESSALON) 200 MG capsule     Patient was advised to avoid cold air, she was advised to take care of Flovent twice daily wash mouth after use.  Use albuterol inhaler as needed.    Follow-up in 3 months for complete physical exam with your primary care physician, or sooner for any other concerns.    There are no Patient Instructions on file for this visit.    Return in about 3 months (around 3/2/2020) for Physical Exam.    Shawn Duong MD  [facility]

## 2019-12-03 ASSESSMENT — ASTHMA QUESTIONNAIRES: ACT_TOTALSCORE: 17

## 2019-12-05 ENCOUNTER — OFFICE VISIT (OUTPATIENT)
Dept: FAMILY MEDICINE | Facility: CLINIC | Age: 64
End: 2019-12-05
Payer: COMMERCIAL

## 2019-12-05 VITALS
HEIGHT: 63 IN | BODY MASS INDEX: 30.3 KG/M2 | DIASTOLIC BLOOD PRESSURE: 85 MMHG | WEIGHT: 171 LBS | SYSTOLIC BLOOD PRESSURE: 142 MMHG | TEMPERATURE: 98.2 F | HEART RATE: 72 BPM | OXYGEN SATURATION: 100 %

## 2019-12-05 DIAGNOSIS — J45.901 REACTIVE AIRWAY DISEASE WITH ACUTE EXACERBATION, UNSPECIFIED ASTHMA SEVERITY, UNSPECIFIED WHETHER PERSISTENT: ICD-10-CM

## 2019-12-05 DIAGNOSIS — M25.511 ACUTE PAIN OF RIGHT SHOULDER: Primary | ICD-10-CM

## 2019-12-05 DIAGNOSIS — R05.3 CHRONIC COUGH: ICD-10-CM

## 2019-12-05 PROCEDURE — 99214 OFFICE O/P EST MOD 30 MIN: CPT | Performed by: PHYSICIAN ASSISTANT

## 2019-12-05 RX ORDER — ACETAMINOPHEN 500 MG
1000 TABLET ORAL EVERY 8 HOURS PRN
Qty: 100 TABLET | Refills: 1 | Status: SHIPPED | OUTPATIENT
Start: 2019-12-05 | End: 2020-04-23

## 2019-12-05 ASSESSMENT — PAIN SCALES - GENERAL: PAINLEVEL: MILD PAIN (3)

## 2019-12-05 ASSESSMENT — MIFFLIN-ST. JEOR: SCORE: 1294.78

## 2019-12-05 NOTE — PROGRESS NOTES
Subjective     Sanjuana Salamanca is a 64 year old female who presents to clinic today for the following health issues:    HPI   Chronic Pain Follow-Up       Type / Location of Pain: Shoulder Pain  Analgesia/pain control:       Recent changes:  same      Overall control: Tolerable with discomfort  Activity level/function:      Daily activities:  Able to do light housework, cooking    Work:  not applicable  Adverse effects:  No  Adherance    Taking medication as directed?  Yes    Participating in other treatments: yes, Physical therapy does not help   Risk Factors:    Sleep:  Poor    Mood/anxiety:  Feeling a little better with medication    Recent family or social stressors:  none noted    Other aggravating factors: use of the arms hurts  No flowsheet data found.  [place]-7 SCORE 11/10/2017   Total Score 0     Encounter-Level CSA:    There are no encounter-level csa.     Patient-Level CSA:    There are no patient-level csa.         How many servings of fruits and vegetables do you eat daily?  2-3    On average, how many sweetened beverages do you drink each day (Examples: soda, juice, sweet tea, etc.  Do NOT count diet or artificially sweetened beverages)?   0    How many days per week do you miss taking your medication? 0    Patient has completed physical therapy without much improvement in symptoms. She notes that the pain has gotten worse since therapy.   She notes some bumps in the upper arm that she can feel when she massages the arm.   Hard to hold something heavy on the right arm.   Hard to sleep on that arm as well.   No numbness/tingling in the arm.   Patient tried a friends voltaren gel which helped her. She would like to try this.     Patient with a cough treated recently. Patient needs a new spacer.     Reviewed and updated as needed this visit by Provider  Tobacco  Allergies  Meds  Problems  Med Hx  Surg Hx  Fam Hx         Review of Systems   ROS COMP: Constitutional, HEENT, cardiovascular, pulmonary, gi  "and gu systems are negative, except as otherwise noted.      Objective    BP (!) 142/85 (BP Location: Left arm, Patient Position: Chair, Cuff Size: Adult Large)   Pulse 72   Temp 98.2  F (36.8  C) (Oral)   Ht 1.6 m (5' 3\")   Wt 77.6 kg (171 lb)   SpO2 100%   Breastfeeding No   BMI 30.29 kg/m    Body mass index is 30.29 kg/m .  Physical Exam   GENERAL: healthy, alert and no distress  MS: no gross musculoskeletal defects noted, no edema- full range of motion of the right shoulder. Minimal pain with very deep palpation of the right biceps muscle.  strength normal. deep tendon reflexes intact.   SKIN: no suspicious lesions or rashes  NEURO: Normal strength and tone, mentation intact and speech normal    Diagnostic Test Results:  none         Assessment & Plan       ICD-10-CM    1. Acute pain of right shoulder M25.511 diclofenac (VOLTAREN) 1 % topical gel     ORTHO  REFERRAL     acetaminophen (TYLENOL) 500 MG tablet   2. Chronic cough R05    3. Reactive airway disease with acute exacerbation, unspecified asthma severity, unspecified whether persistent J45.901 Spacer/Aero-Holding Chambers (E-Z SPACER) CAMRON   Will try the voltaren gel and get an ortho consult as not much improvement in her symptoms with physical therapy. Can also try tylenol as needed.            No follow-ups on file.    Drea Denise PA-C  [facility]      "

## 2019-12-13 DIAGNOSIS — R05.3 CHRONIC COUGH: ICD-10-CM

## 2019-12-13 NOTE — TELEPHONE ENCOUNTER
Prescription approved per [facility] Refill Protocol.  Taylor Michadu, RN,BSN  [facility]

## 2019-12-13 NOTE — TELEPHONE ENCOUNTER
"Requested Prescriptions   Pending Prescriptions Disp Refills     omeprazole (PRILOSEC) 20 MG DR capsule [Pharmacy Med Name: OMEPRAZOLE 20MG CAPSULES] 180 capsule 0     Sig: TAKE 1 CAPSULE(20 MG) BY MOUTH TWICE DAILY   Last Written Prescription Date:  11/7/19  Last Fill Quantity: 180,  # refills: 0   Last office visit: 12/5/2019 with prescribing provider:     Future Office Visit:        PPI Protocol Passed - 12/13/2019  9:32 AM        Passed - Not on Clopidogrel (unless Pantoprazole ordered)        Passed - No diagnosis of osteoporosis on record        Passed - Recent (12 mo) or future (30 days) visit within the authorizing provider's specialty     Patient has had an office visit with the authorizing provider or a provider within the authorizing providers department within the previous 12 mos or has a future within next 30 days. See \"Patient Info\" tab in inbasket, or \"Choose Columns\" in Meds & Orders section of the refill encounter.              Passed - Medication is active on med list        Passed - Patient is age 18 or older        Passed - No active pregnacy on record        Passed - No positive pregnancy test in past 12 months          "

## 2020-01-14 PROBLEM — M25.511 RIGHT SHOULDER PAIN: Status: RESOLVED | Noted: 2019-10-16 | Resolved: 2020-01-14

## 2020-01-14 NOTE — PROGRESS NOTES
Subjective:  HPI                    Objective:  System    Physical Exam    General     ROS    Assessment/Plan:    DISCHARGE REPORT    Progress reporting period is from 10/16/19 to 11/19/19.     SUBJECTIVE  Subjective: Things feel the same. Is trying her best with the exercises   Current Pain level: 2/10   Initial Pain level: 0/10        ;   ,     Patient has failed to return to therapy so current objective findings are unknown.  The subjective and objective information are from the last SOAP note on this patient.    OBJECTIVE  Objective: Pt requires many cues with home exercise program.       ASSESSMENT/PLAN  Diagnosis 1:  B Shoulder Pain  Pain -  hot/cold therapy, manual therapy, self management, education and home program  Decreased ROM/flexibility - manual therapy and therapeutic exercise  Decreased strength - therapeutic exercise and therapeutic activities  Impaired posture - neuro re-education  STG/LTGs have been met or progress has been made towards goals:  Yes (See Goal flow sheet completed today.)  Assessment of Progress: The patient has not returned to therapy. Current status is unknown.  Self Management Plans:  Patient has been instructed in a home treatment program.  Patient  has been instructed in self management of symptoms.  Sanjuana continues to require the following intervention to meet STG and LTG's: PT intervention is no longer required to meet STG/LTG.  The patient failed to complete scheduled/ordered appointments so current information is unknown.  We will discharge this patient from PT.    Recommendations:  This patient is ready to be discharged from therapy and continue their home treatment program.    Please refer to the daily flowsheet for treatment today, total treatment time and time spent performing 1:1 timed codes.

## 2020-01-28 DIAGNOSIS — K59.00 CONSTIPATION, UNSPECIFIED CONSTIPATION TYPE: ICD-10-CM

## 2020-01-29 NOTE — TELEPHONE ENCOUNTER
"Requested Prescriptions   Pending Prescriptions Disp Refills     polyethylene glycol (MIRALAX/GLYCOLAX) powder [Pharmacy Med Name: POLYETH GLYCOL 3350 NF POWDER 510GM] 527 g 0     Sig: MIX 17 GRAMS(1 CAPFUL) INTO LIQUID AND DRINK BY MOUTH EVERY DAY   Last Written Prescription Date:  10-3-19  Last Fill Quantity: 527g,  # refills: 0   Last office visit: 12/5/2019 with prescribing provider:  12-5-19   Future Office Visit:        [employer] Protocol Passed - 1/28/2020  7:32 PM        Passed - Patient is age 6 or older        Passed - Recent (12 mo) or future (30 days) visit within the authorizing provider's specialty     Patient has had an office visit with the authorizing provider or a provider within the authorizing providers department within the previous 12 mos or has a future within next 30 days. See \"Patient Info\" tab in inbasket, or \"Choose Columns\" in Meds & Orders section of the refill encounter.              Passed - Medication is active on med list          "

## 2020-01-30 RX ORDER — POLYETHYLENE GLYCOL 3350 17 G/17G
POWDER, FOR SOLUTION ORAL
Qty: 527 G | Refills: 0 | Status: SHIPPED | OUTPATIENT
Start: 2020-01-30 | End: 2020-03-05

## 2020-01-30 NOTE — TELEPHONE ENCOUNTER
Prescription approved per [facility] Refill Protocol.  Elsa Lee RN on 1/30/2020 at 3:30 PM

## 2020-02-13 ENCOUNTER — PATIENT OUTREACH (OUTPATIENT)
Dept: GERIATRIC MEDICINE | Facility: CLINIC | Age: 65
End: 2020-02-13

## 2020-02-13 PROBLEM — Z76.89 HEALTH CARE HOME: Status: ACTIVE | Noted: 2020-02-13

## 2020-02-13 NOTE — LETTER
February 13, 2020    TORI LOGAN  [address],   [place] [address]        Dear  Tori,    Welcome to [facility] [place] [facility] Care Plus (MSC+) health program. My name is Ruth Long RN, PHN. I am your MSC+ care coordinator.     I will call you soon to see how you are doing and determine what needs you may have. My job is to help connect you to services, complete an assessment, and develop a care plan with you. There is no charge to you for the care coordination and assessment services. Our goal is to keep you as healthy and independent as possible.     [facility] includes the benefits you may receive from Medical Assistance.    Soon, you will receive a new [facility] member identification (ID) card from [facility]. When you receive it, please use this card along with your [place] Health Care Programs card and Prescription Drug Coverage Program card. When you receive, it please use this card where you get your health services. If you have Medicare, you will need to show your Medicare card when you get health services.    If you have questions, please call me at [phone]. If you reach my voice mail, leave a message and your phone number. If you are hearing impaired, please call the [place] Relay at 116 or [phone] (ubuoph-qw-mibtdi relay service).    Sincerely,      Ruth Long RN, PHN    E-mail: [email or web address]  Phone: [phone]      [facility] [id number] DHS Approved ([id number])  [id number] (11/18)

## 2020-02-13 NOTE — PROGRESS NOTES
[facility] Care Coordination Contact    Member became effective with  [facility] on 2/1/2020 with [employer] MSC+.  Previous Health Plan: [employer] MSC+/[employer] ? - verifying with [facility]  Previous Care System: [facility]  Previous care coordinators name and number: unknown - no entries for Jan 2020  Waiver Type: N/A  Last MMIS Entry: Date 4/23/19 and Type [facility] entry  MMIS visit date if different from above: n/a  Services Listed in MMIS:   none  UTF received: No: Requested on 2/13/20.  Emailed CM Intake to request who previous CC/Care System was for January.  [employer]are site shows member had [employer] Connect in [place], but [place] [employer] MSC+ for January.  Once verified which is current I will update you.  Nathalia Woods  Case Management Specialist  [facility]  [phone]

## 2020-02-19 NOTE — PROGRESS NOTES
Called client to introduce self as new CC. Left vm requesting to return call.    Ruth Long RN BSN N  [facility] Coordinator  [phone]  Fax:[phone]

## 2020-02-20 NOTE — PROGRESS NOTES
2/20/20- Member returned call and states she is not interested in home visit at this time. Reports being independent and doing well. Receives support from her son and granddaughter who live with her.Innformed member that Care Coordinator will follow up with her every 6 months. Shared contact information and encouraged member to call with any questions or concerns in the meantime.     Ruth Long RN BSN ROBERTN  [facility] Care Coordinator  [phone]  Fax:[phone]

## 2020-03-04 DIAGNOSIS — K59.00 CONSTIPATION, UNSPECIFIED CONSTIPATION TYPE: ICD-10-CM

## 2020-03-05 RX ORDER — POLYETHYLENE GLYCOL 3350 17 G/17G
POWDER, FOR SOLUTION ORAL
Qty: 510 G | Refills: 0 | Status: SHIPPED | OUTPATIENT
Start: 2020-03-05 | End: 2020-08-31

## 2020-03-05 RX ORDER — MULTIVITAMIN/IRON/FOLIC ACID 18MG-0.4MG
TABLET ORAL
Qty: 30 TABLET | Refills: 11 | Status: SHIPPED | OUTPATIENT
Start: 2020-03-05 | End: 2020-10-12

## 2020-04-22 DIAGNOSIS — M25.511 ACUTE PAIN OF RIGHT SHOULDER: ICD-10-CM

## 2020-04-22 NOTE — TELEPHONE ENCOUNTER
"Requested Prescriptions   Pending Prescriptions Disp Refills     ACETAMINOPHEN EXTRA STRENGTH 500 MG tablet [Pharmacy Med Name: ACETAMINOPHEN 500MG E/S CAPLETS] 100 tablet 1     Sig: TAKE 2 TABLETS(1000 MG) BY MOUTH EVERY 8 HOURS AS NEEDED FOR MILD PAIN   Last Written Prescription Date:  12-5-19  Last Fill Quantity: 100,  # refills: 1   Last office visit: 12/5/2019 with prescribing provider:  12-5-19   Future Office Visit:        Analgesics (Non-Narcotic Tylenol and ASA Only) Passed - 4/22/2020  3:16 PM        Passed - Recent (12 mo) or future (30 days) visit within the authorizing provider's specialty     Patient has had an office visit with the authorizing provider or a provider within the authorizing providers department within the previous 12 mos or has a future within next 30 days. See \"Patient Info\" tab in inbasket, or \"Choose Columns\" in Meds & Orders section of the refill encounter.              Passed - Patient is 7 months old or older     If patient is a peds patient of the age 7 mos -12 years, ok to refill using weight-based dosing.     If >3g daily and/or sig is not \"prn\", check for liver enzymes. If normal in the last year, ok to refill.  If not, refer to the provider.          Passed - Medication is active on med list             "

## 2020-04-23 RX ORDER — PSEUDOEPHED/ACETAMINOPH/DIPHEN 30MG-500MG
TABLET ORAL
Qty: 100 TABLET | Refills: 1 | Status: SHIPPED | OUTPATIENT
Start: 2020-04-23 | End: 2021-07-05

## 2020-06-02 DIAGNOSIS — I10 ESSENTIAL HYPERTENSION WITH GOAL BLOOD PRESSURE LESS THAN 140/90: ICD-10-CM

## 2020-06-03 RX ORDER — HYDROCHLOROTHIAZIDE 25 MG/1
TABLET ORAL
Qty: 90 TABLET | Refills: 0 | Status: SHIPPED | OUTPATIENT
Start: 2020-06-03 | End: 2020-08-31

## 2020-06-03 NOTE — TELEPHONE ENCOUNTER
Routing refill request to provider for review/approval because:  Failed protocol.  Taylor Michaud RN,BSN  [facility]

## 2020-08-31 DIAGNOSIS — I10 ESSENTIAL HYPERTENSION WITH GOAL BLOOD PRESSURE LESS THAN 140/90: ICD-10-CM

## 2020-08-31 DIAGNOSIS — K59.00 CONSTIPATION, UNSPECIFIED CONSTIPATION TYPE: ICD-10-CM

## 2020-08-31 RX ORDER — POLYETHYLENE GLYCOL 3350 17 G/17G
POWDER, FOR SOLUTION ORAL
Qty: 510 G | Refills: 0 | Status: SHIPPED | OUTPATIENT
Start: 2020-08-31 | End: 2020-10-12

## 2020-08-31 RX ORDER — HYDROCHLOROTHIAZIDE 25 MG/1
TABLET ORAL
Qty: 90 TABLET | Refills: 0 | Status: SHIPPED | OUTPATIENT
Start: 2020-08-31 | End: 2020-10-12

## 2020-08-31 NOTE — LETTER
[address] [address]  Phone: [phone]  Fax: [phone]        September 21, 2020      Sanjuana Salamanca                                                                                                                                [address] [place] [address]            Dear Ms. Salamanca,    We are concerned about your health care.  We recently provided you with a medication refill.  Many medications require routine follow-up with your Doctor.      At this time we ask that: You schedule a routine office visit with your physician to follow your medication    Your prescription: Has been refilled for 1 month so you may have time for the above noted follow-up.      Thank you,      Drea Denise PA-C  hnr

## 2020-08-31 NOTE — LETTER
[address] [address]  Phone: [phone]  Fax: [phone]        September 10, 2020      Sanjuana Salamanca                                                                                                                                [address] [place] [id number]            Dear Ms. Salamanca,    We are concerned about your health care.  We recently provided you with a medication refill.  Many medications require routine follow-up with your Doctor.      At this time we ask that: You schedule a routine office visit with your physician to follow your medication in the next 2-3 months.    Your prescription: Has been refilled for 1 month so you may have time for the above noted follow-up.      Thank you,      Lauro Meza MD  [facility]

## 2020-08-31 NOTE — TELEPHONE ENCOUNTER
"Requested Prescriptions   Pending Prescriptions Disp Refills     polyethylene glycol (MIRALAX) 17 GM/Dose powder [Pharmacy Med Name: POLYETH GLYCOL 3350 NF POWDER 510GM] 510 g 0     Sig: MIX 17 GRAMS INTO LIQUID AND DRINK BY MOUTH DAILY       Laxatives Protocol Passed - 8/31/2020  3:13 PM        Passed - Patient is age 6 or older        Passed - Recent (12 mo) or future (30 days) visit within the authorizing provider's specialty     Patient has had an office visit with the authorizing provider or a provider within the authorizing providers department within the previous 12 mos or has a future within next 30 days. See \"Patient Info\" tab in inbasket, or \"Choose Columns\" in Meds & Orders section of the refill encounter.              Passed - Medication is active on med list           hydrochlorothiazide (HYDRODIURIL) 25 MG tablet [Pharmacy Med Name: HYDROCHLOROTHIAZIDE 25MG TABLETS] 90 tablet 0     Sig: TAKE 1 TABLET(25 MG) BY MOUTH DAILY       Diuretics (Including Combos) Protocol Failed - 8/31/2020  3:13 PM        Failed - Blood pressure under 140/90 in past 12 months     BP Readings from Last 3 Encounters:   12/05/19 (!) 142/85   12/02/19 119/68   10/01/19 133/87                 Failed - Normal serum creatinine on file in past 12 months     Recent Labs   Lab Test 05/14/19  1417   CR 0.61              Failed - Normal serum potassium on file in past 12 months     Recent Labs   Lab Test 05/14/19  1417   POTASSIUM 3.7                    Failed - Normal serum sodium on file in past 12 months     Recent Labs   Lab Test 05/14/19  1417                 Passed - Recent (12 mo) or future (30 days) visit within the authorizing provider's specialty     Patient has had an office visit with the authorizing provider or a provider within the authorizing providers department within the previous 12 mos or has a future within next 30 days. See \"Patient Info\" tab in inbasket, or \"Choose Columns\" in Meds & Orders section of the " refill encounter.              Passed - Medication is active on med list        Passed - Patient is age 18 or older        Passed - No active pregancy on record        Passed - No positive pregnancy test in past 12 months           Routing refill request to provider for review/approval because:  Failed protocol.  Taylor Michaud RN,BSN  [facility]

## 2020-09-29 ENCOUNTER — PATIENT OUTREACH (OUTPATIENT)
Dept: GERIATRIC MEDICINE | Facility: CLINIC | Age: 65
End: 2020-09-29

## 2020-09-29 NOTE — PROGRESS NOTES
[facility] Care Coordination Contact      [facility] Six-Month Telephone Assessment    6 month telephone assessment completed on 9/29/20.    ER visits: No  Hospitalizations: No  TCU stays: No  Significant health status changes: No  Falls/Injuries: No  ADL/IADL changes: No  Changes in services: No    Caregiver Assessment follow up:  N/A    Goals: See POC in chart for goal progress documentation.      Will see member in 6 months for an annual health risk assessment.   Encouraged member to call CC with any questions or concerns in the meantime.     Ruth Long RN BSN PHN  [facility] Care Coordinator  [phone]  Fax:[phone]

## 2020-10-07 ENCOUNTER — TELEPHONE (OUTPATIENT)
Dept: FAMILY MEDICINE | Facility: CLINIC | Age: 65
End: 2020-10-07

## 2020-10-07 NOTE — TELEPHONE ENCOUNTER
Reason for call:  Patient reporting a symptom    Symptom or request: High BP    Duration (how long have symptoms been present): today    Have you been treated for this before? Yes    Additional comments: Patient states she has no symptoms but that her blood pressure is high at 160/100.  Please have RN call her back to discuss and advise.  Pt did make an appointment with provider but not until Monday.     Phone Number patient can be reached at:  Other phone number:  [phone]    Best Time:  anytime    Can we leave a detailed message on this number:  YES    Call taken on 10/7/2020 at 2:09 PM by Indira Roach

## 2020-10-07 NOTE — TELEPHONE ENCOUNTER
Attempt # 1  Called patient at home [phone]  Was call answered?  No answer, left message to call nurse line at [phone]            Kandis Rodney RN  [facility]

## 2020-10-08 NOTE — TELEPHONE ENCOUNTER
Attempt # 2  Called patient at home [phone]  Was call answered?  yes, daughter answered phone, no consent to communicate in [facility].    BP yesterday had a headache, feels okay today. Has not missed any doses of medication. Ate too much salt. Nurse advised to limit salt intake and make sure comes to appointment on Monday. Patient verbalized understanding and agreement with plan and had no questions.                  Kandis Rodney RN  [facility]

## 2020-10-12 ENCOUNTER — OFFICE VISIT (OUTPATIENT)
Dept: FAMILY MEDICINE | Facility: CLINIC | Age: 65
End: 2020-10-12
Payer: COMMERCIAL

## 2020-10-12 VITALS
BODY MASS INDEX: 30.04 KG/M2 | WEIGHT: 169.6 LBS | SYSTOLIC BLOOD PRESSURE: 135 MMHG | TEMPERATURE: 98.1 F | DIASTOLIC BLOOD PRESSURE: 86 MMHG | HEART RATE: 71 BPM

## 2020-10-12 DIAGNOSIS — E78.5 HYPERLIPIDEMIA LDL GOAL <160: ICD-10-CM

## 2020-10-12 DIAGNOSIS — L98.9 CHANGING SKIN LESION: ICD-10-CM

## 2020-10-12 DIAGNOSIS — K59.00 CONSTIPATION, UNSPECIFIED CONSTIPATION TYPE: ICD-10-CM

## 2020-10-12 DIAGNOSIS — L30.9 ECZEMA, UNSPECIFIED TYPE: ICD-10-CM

## 2020-10-12 DIAGNOSIS — I10 ESSENTIAL HYPERTENSION WITH GOAL BLOOD PRESSURE LESS THAN 140/90: Primary | ICD-10-CM

## 2020-10-12 DIAGNOSIS — R53.83 OTHER FATIGUE: ICD-10-CM

## 2020-10-12 DIAGNOSIS — R73.03 PREDIABETES: ICD-10-CM

## 2020-10-12 DIAGNOSIS — H57.89 EYE IRRITATION: ICD-10-CM

## 2020-10-12 DIAGNOSIS — M17.9 OSTEOARTHRITIS OF KNEE, UNSPECIFIED LATERALITY, UNSPECIFIED OSTEOARTHRITIS TYPE: ICD-10-CM

## 2020-10-12 LAB
ALBUMIN SERPL-MCNC: 3.9 G/DL (ref 3.4–5)
ALP SERPL-CCNC: 73 U/L (ref 40–150)
ALT SERPL W P-5'-P-CCNC: 26 U/L (ref 0–50)
ANION GAP SERPL CALCULATED.3IONS-SCNC: 6 MMOL/L (ref 3–14)
AST SERPL W P-5'-P-CCNC: 19 U/L (ref 0–45)
BASOPHILS # BLD AUTO: 0 10E9/L (ref 0–0.2)
BASOPHILS NFR BLD AUTO: 0.5 %
BILIRUB SERPL-MCNC: 0.5 MG/DL (ref 0.2–1.3)
BUN SERPL-MCNC: 13 MG/DL (ref 7–30)
CALCIUM SERPL-MCNC: 9.8 MG/DL (ref 8.5–10.1)
CHLORIDE SERPL-SCNC: 104 MMOL/L (ref 94–109)
CHOLEST SERPL-MCNC: 240 MG/DL
CO2 SERPL-SCNC: 30 MMOL/L (ref 20–32)
CREAT SERPL-MCNC: 0.61 MG/DL (ref 0.52–1.04)
DIFFERENTIAL METHOD BLD: ABNORMAL
EOSINOPHIL # BLD AUTO: 0.2 10E9/L (ref 0–0.7)
EOSINOPHIL NFR BLD AUTO: 1.8 %
ERYTHROCYTE [DISTWIDTH] IN BLOOD BY AUTOMATED COUNT: 15.2 % (ref 10–15)
GFR SERPL CREATININE-BSD FRML MDRD: >90 ML/MIN/{1.73_M2}
GLUCOSE SERPL-MCNC: 101 MG/DL (ref 70–99)
HBA1C MFR BLD: 5.9 % (ref 0–5.6)
HCT VFR BLD AUTO: 39.7 % (ref 35–47)
HDLC SERPL-MCNC: 41 MG/DL
HGB BLD-MCNC: 13 G/DL (ref 11.7–15.7)
LDLC SERPL CALC-MCNC: 167 MG/DL
LYMPHOCYTES # BLD AUTO: 2.7 10E9/L (ref 0.8–5.3)
LYMPHOCYTES NFR BLD AUTO: 31.7 %
MCH RBC QN AUTO: 28.2 PG (ref 26.5–33)
MCHC RBC AUTO-ENTMCNC: 32.7 G/DL (ref 31.5–36.5)
MCV RBC AUTO: 86 FL (ref 78–100)
MONOCYTES # BLD AUTO: 0.9 10E9/L (ref 0–1.3)
MONOCYTES NFR BLD AUTO: 10.4 %
NEUTROPHILS # BLD AUTO: 4.8 10E9/L (ref 1.6–8.3)
NEUTROPHILS NFR BLD AUTO: 55.6 %
NONHDLC SERPL-MCNC: 199 MG/DL
PLATELET # BLD AUTO: 362 10E9/L (ref 150–450)
POTASSIUM SERPL-SCNC: 3.2 MMOL/L (ref 3.4–5.3)
PROT SERPL-MCNC: 8.1 G/DL (ref 6.8–8.8)
RBC # BLD AUTO: 4.61 10E12/L (ref 3.8–5.2)
SODIUM SERPL-SCNC: 140 MMOL/L (ref 133–144)
TRIGL SERPL-MCNC: 158 MG/DL
VIT B12 SERPL-MCNC: 572 PG/ML (ref 193–986)
WBC # BLD AUTO: 8.7 10E9/L (ref 4–11)

## 2020-10-12 PROCEDURE — 99214 OFFICE O/P EST MOD 30 MIN: CPT | Performed by: PHYSICIAN ASSISTANT

## 2020-10-12 PROCEDURE — 85025 COMPLETE CBC W/AUTO DIFF WBC: CPT | Performed by: PHYSICIAN ASSISTANT

## 2020-10-12 PROCEDURE — 82306 VITAMIN D 25 HYDROXY: CPT | Performed by: PHYSICIAN ASSISTANT

## 2020-10-12 PROCEDURE — 80061 LIPID PANEL: CPT | Performed by: PHYSICIAN ASSISTANT

## 2020-10-12 PROCEDURE — 82607 VITAMIN B-12: CPT | Performed by: PHYSICIAN ASSISTANT

## 2020-10-12 PROCEDURE — 80053 COMPREHEN METABOLIC PANEL: CPT | Performed by: PHYSICIAN ASSISTANT

## 2020-10-12 PROCEDURE — 83036 HEMOGLOBIN GLYCOSYLATED A1C: CPT | Performed by: PHYSICIAN ASSISTANT

## 2020-10-12 RX ORDER — DICLOFENAC SODIUM 75 MG/1
75 TABLET, DELAYED RELEASE ORAL 2 TIMES DAILY PRN
Qty: 60 TABLET | Refills: 1 | Status: SHIPPED | OUTPATIENT
Start: 2020-10-12 | End: 2020-12-08

## 2020-10-12 RX ORDER — POLYETHYLENE GLYCOL 3350 17 G/17G
POWDER, FOR SOLUTION ORAL
Qty: 510 G | Refills: 11 | Status: SHIPPED | OUTPATIENT
Start: 2020-10-12 | End: 2021-12-21

## 2020-10-12 RX ORDER — TRIAMCINOLONE ACETONIDE 0.1 %
PASTE (GRAM) DENTAL
Qty: 5 G | Refills: 0 | Status: SHIPPED | OUTPATIENT
Start: 2020-10-12 | End: 2022-01-07

## 2020-10-12 RX ORDER — HYDROCHLOROTHIAZIDE 25 MG/1
25 TABLET ORAL DAILY
Qty: 90 TABLET | Refills: 3 | Status: SHIPPED | OUTPATIENT
Start: 2020-10-12 | End: 2020-11-10

## 2020-10-12 RX ORDER — MULTIVITAMIN/IRON/FOLIC ACID 18MG-0.4MG
1 TABLET ORAL DAILY
Qty: 30 TABLET | Refills: 11 | Status: SHIPPED | OUTPATIENT
Start: 2020-10-12

## 2020-10-12 NOTE — PROGRESS NOTES
Subjective     Sanjuana Salamanca is a 65 year old female who presents to clinic today for the following health issues:    HPI         Pt is here for having high blood pressures. Pt stated that her blood pressures have been good.      She notes that she has a lot of fatigue. She can be fine one day and then the next day needs to stay in bed she is so tired.     Has only been takign hydrochlorothiazide, stopped all other Blood pressure meds. Blood pressure stable today.    Miralax helps prn with constipation.     Has been having left eye irritation, needs follow up with optometry.   Irritated skin on the left antecube, has used a cream in the past.   Dark skin lesions on the face, now with a red undertone.     Review of Systems   Constitutional, HEENT, cardiovascular, pulmonary, gi and gu systems are negative, except as otherwise noted.      Objective    /86 (BP Location: Left arm, Patient Position: Chair, Cuff Size: Adult Large)   Pulse 71   Temp 98.1  F (36.7  C) (Oral)   Wt 76.9 kg (169 lb 9.6 oz)   BMI 30.04 kg/m    Body mass index is 30.04 kg/m .  Physical Exam   GENERAL: healthy, alert and no distress  EYES: Eyes grossly normal to inspection, PERRL and conjunctivae and sclerae normal  RESP: lungs clear to auscultation - no rales, rhonchi or wheezes  CV: regular rate and rhythm, normal S1 S2, no S3 or S4, no murmur, click or rub, no peripheral edema and peripheral pulses strong  MS: no gross musculoskeletal defects noted, no edema  SKIN: eczema noted on the left antecube. There is melasma on the bilateral cheeks, but a red based lesion with darker pigment noted on the left cheek.           Assessment & Plan     Essential hypertension with goal blood pressure less than 140/90  Stable on just hydrochlorothiazide. Will monitor.   - hydrochlorothiazide (HYDRODIURIL) 25 MG tablet; Take 1 tablet (25 mg) by mouth daily    Constipation, unspecified constipation type  Prn use.   - polyethylene glycol (MIRALAX) 17  "GM/Dose powder; MIX 17 GRAMS INTO LIQUID AND DRINK BY MOUTH DAILY  - Multiple Vitamins-Minerals (CENTROVITE) TABS; Take 1 tablet by mouth daily    Prediabetes  - Hemoglobin A1c    Hyperlipidemia LDL goal <160  - Lipid panel reflex to direct LDL Fasting    Other fatigue  Could be related to diabetes or vitamin deficiency. Will get labs.   - CBC with platelets differential  - Vitamin B12  - Vitamin D Deficiency    Eye irritation  Referral to optometry. Has had problems with allergic conjunc in the past, will prescription zaditor.   - EYE ADULT REFERRAL; Future  - ketotifen (ZADITOR) 0.025 % ophthalmic solution; Place 1 drop Into the left eye 2 times daily    Eczema, unspecified type  Prn use.   - triamcinolone (KENALOG) 0.1 % paste; Apply on mouth sore twice daily x 14 days.    Osteoarthritis of knee, unspecified laterality, unspecified osteoarthritis type  Prn use.   - diclofenac (VOLTAREN) 75 MG EC tablet; Take 1 tablet (75 mg) by mouth 2 times daily as needed for moderate pain    Changing skin lesion  Concern for possible BCC, referral   - DERMATOLOGY ADULT REFERRAL; Future     BMI:   Estimated body mass index is 30.04 kg/m  as calculated from the following:    Height as of 12/5/19: 1.6 m (5' 3\").    Weight as of this encounter: 76.9 kg (169 lb 9.6 oz).   Weight management plan: Discussed healthy diet and exercise guidelines             Return in about 1 year (around 10/12/2021) for BP Recheck.    Drea Denise PA-C  [facility]    "

## 2020-10-13 ENCOUNTER — TELEPHONE (OUTPATIENT)
Dept: FAMILY MEDICINE | Facility: CLINIC | Age: 65
End: 2020-10-13

## 2020-10-13 DIAGNOSIS — E87.6 HYPOKALEMIA: Primary | ICD-10-CM

## 2020-10-13 LAB — DEPRECATED CALCIDIOL+CALCIFEROL SERPL-MC: 35 UG/L (ref 20–75)

## 2020-10-13 NOTE — TELEPHONE ENCOUNTER
PA Initiation    Medication: triamcinolone (KENALOG) 0.1 % paste  Insurance Company: [employer] - Phone [phone] Fax [phone]  Pharmacy Filling the Rx: [id number] [place], [place] - [address] AT [facility] [address]  Filling Pharmacy Phone: [phone]  Filling Pharmacy Fax: [phone]  Start Date: 10/13/2020

## 2020-10-13 NOTE — TELEPHONE ENCOUNTER
Prior Authorization Approval    Authorization Effective Date: 9/13/2020  Authorization Expiration Date: 10/13/2021  Medication: triamcinolone (KENALOG) 0.1 % paste  Approved Dose/Quantity:   Reference #: [id number]   Insurance Company: [employer] - Phone [phone] Fax [phone]  Expected CoPay:       CoPay Card Available:      Foundation Assistance Needed:    Which Pharmacy is filling the prescription (Not needed for infusion/clinic administered): [id number] - [facility] [address] AT [facility] [address] [facility] [address]  Pharmacy Notified: Yes  Patient Notified: Yes, **Instructed pharmacy to notify patient when script is ready to /ship.**

## 2020-10-13 NOTE — TELEPHONE ENCOUNTER
Please call patient.     Her labs show a low potassium. She should work on increasing potassium in her diet and recheck in 2 weeks. Orders placed.   Her cholesterol is slightly high as well so she should work on a low cholesterol diet and getting regular exercise.   Her sugar improved from last year and all other labs are stable.   Drea Denise PA-C

## 2020-10-13 NOTE — TELEPHONE ENCOUNTER
Prior Authorization Retail Medication Request    Medication/Dose: triamcinolone (KENALOG) 0.1 % paste  ICD code (if different than what is on RX):    Previously Tried and Failed:    Rationale:      Insurance Name:  [facility]  Insurance ID:  [id number]       Pharmacy Information (if different than what is on RX)  Name:  [facility]  Phone:  [phone]      TIEN Beverly MA

## 2020-10-14 NOTE — TELEPHONE ENCOUNTER
Attempt # 1  Called [phone] (home)     Did patient answer the phone: No, left a message on voicemail to return call to triage line at [phone].    Taylor MichaudRN,BSN  [facility]

## 2020-10-16 NOTE — TELEPHONE ENCOUNTER
I don't find any future lab orders?    Attempted to call patient at home/mobile number, left message on voicemail; patient was instructed to return call to [facility] RN directly on the RN call back line at [phone]     Also routed back to PCP to place desired future orders.    Kim Reardon, KRISTIE  [facility]

## 2020-10-19 NOTE — TELEPHONE ENCOUNTER
Patient returned call to RN line and left message for call back to her.  Kim Reardon RN  [facility]

## 2020-10-20 NOTE — TELEPHONE ENCOUNTER
Attempt # 2  Called patient at home [phone] with assist of Pavel  ID # [id number]   Nurse sees lab order in, referrals for dermatology and ophthalmology.       Was call answered?  No answer, left message to call nurse line at [phone]        Kandis Rodney RN  [facility]

## 2020-10-22 ENCOUNTER — TELEPHONE (OUTPATIENT)
Dept: FAMILY MEDICINE | Facility: CLINIC | Age: 65
End: 2020-10-22

## 2020-10-22 NOTE — TELEPHONE ENCOUNTER
I see labs 10/12, not 10/1.    See note in 10/13/20 phone encounter, we've been trying to reach her and have left at least 4 messages, requires [facility] .    Please call patient.      Her labs show a low potassium. She should work on increasing potassium in her diet and recheck in 2 weeks. Orders placed.   Her cholesterol is slightly high as well so she should work on a low cholesterol diet and getting regular exercise.   Her sugar improved from last year and all other labs are stable.   Drea Denise PA-C    This call is from Surendra Reid, I don't see consent to communicate with anyone on file.    I called and spoke to Surendra, she did 3 way conference call with patient who speaks enough English that I would be able to understood that it is okay to give message to Surendra.   Patient did not answer, Surendra says she will call her mom in a little while and call RN line with Sanjuana on line with her.   I advised verbal consent okay once but Sanjuana needs to fill out written consent to communicate next time she is in clinic.    Await call back from Surendra and patient.    Kim Reardon RN  [facility]

## 2020-10-22 NOTE — TELEPHONE ENCOUNTER
Attempt # 3  Called patient at home [phone] with assist of Pavel     Nurse sees lab order in, referrals for dermatology and ophthalmology.         Was call answered?  No answer, left message to call nurse line at [phone]             Kandis Rodney RN  [facility]

## 2020-10-22 NOTE — TELEPHONE ENCOUNTER
Patient returned call - nurse picked up - relayed below message from provider to patient, Patient verbalized understanding and agreement with plan and had no questions.     scheduled lab only appointment 11/3      aKndis Rodney RN  Triage Nurse  [facility]  Appointment line: [phone]  [facility], 24 hour nurse line, available by calling clinic at [phone] and following prompts.

## 2020-10-22 NOTE — TELEPHONE ENCOUNTER
See other encounter for same issue, closing this one.    Kim Reardon RN  [facility]

## 2020-10-22 NOTE — TELEPHONE ENCOUNTER
I copied attempt to call patient earlier into this new encounter as well, looks like there are other referrals to communicate in addition to lab results:    Attempt # 3  Called patient at home [phone] with assist of Pavel     Nurse sees lab order in, referrals for dermatology and ophthalmology.         Was call answered?  No answer, left message to call nurse line at [phone]         Kandis Rodney RN  [facility]    See below, daughter plans to call us with mother on the line.    Kim Reardon RN  [facility]

## 2020-10-22 NOTE — TELEPHONE ENCOUNTER
Reason for Call:  Request for results:    Name of test or procedure: Labs    Date of test of procedure: 10/1    Location of the test or procedure: [facility] to leave the result message on voice mail or with a family member? YES    Phone number Patient can be reached at:  [phone] ([place]/patient's daughter     Additional comments: Surendra, patient's daughter is requesting the results because mom does not speak English.    Call taken on 10/22/2020 at 11:58 AM by Jackie Sutherland

## 2020-11-03 DIAGNOSIS — E87.6 HYPOKALEMIA: ICD-10-CM

## 2020-11-03 LAB — POTASSIUM SERPL-SCNC: 3.1 MMOL/L (ref 3.4–5.3)

## 2020-11-03 PROCEDURE — 84132 ASSAY OF SERUM POTASSIUM: CPT | Performed by: PHYSICIAN ASSISTANT

## 2020-11-04 ENCOUNTER — TELEPHONE (OUTPATIENT)
Dept: FAMILY MEDICINE | Facility: CLINIC | Age: 65
End: 2020-11-04

## 2020-11-04 NOTE — TELEPHONE ENCOUNTER
Please call patient. Patient's potassium level remains low. I would like to see her in clinic next week to discuss changing her blood pressure medications-which is likely causing the low potassium.   Drea Denise PA-C

## 2020-11-04 NOTE — TELEPHONE ENCOUNTER
Attempt # 1  Called patient at home [phone] with assist of [place] .  Was call answered?  No answer, mail box full unable to leave a message.          Kandis Rodney RN  [facility]

## 2020-11-05 NOTE — TELEPHONE ENCOUNTER
Chart indicates  NOT needed.    I called and spoke to patient, advised her of provider note, scheduled her for clinic visit Tuesday as advised.    Kim Reardon RN  [facility]

## 2020-11-10 ENCOUNTER — OFFICE VISIT (OUTPATIENT)
Dept: FAMILY MEDICINE | Facility: CLINIC | Age: 65
End: 2020-11-10
Payer: COMMERCIAL

## 2020-11-10 VITALS
OXYGEN SATURATION: 98 % | HEART RATE: 97 BPM | BODY MASS INDEX: 29.77 KG/M2 | SYSTOLIC BLOOD PRESSURE: 147 MMHG | WEIGHT: 168 LBS | DIASTOLIC BLOOD PRESSURE: 84 MMHG | TEMPERATURE: 98.5 F | HEIGHT: 63 IN

## 2020-11-10 DIAGNOSIS — I10 ESSENTIAL HYPERTENSION WITH GOAL BLOOD PRESSURE LESS THAN 140/90: ICD-10-CM

## 2020-11-10 DIAGNOSIS — H61.23 BILATERAL IMPACTED CERUMEN: ICD-10-CM

## 2020-11-10 DIAGNOSIS — E87.6 HYPOKALEMIA: Primary | ICD-10-CM

## 2020-11-10 DIAGNOSIS — Z20.822 EXPOSURE TO COVID-19 VIRUS: ICD-10-CM

## 2020-11-10 PROCEDURE — 99214 OFFICE O/P EST MOD 30 MIN: CPT | Performed by: PHYSICIAN ASSISTANT

## 2020-11-10 RX ORDER — AMLODIPINE BESYLATE 5 MG/1
5 TABLET ORAL DAILY
Qty: 90 TABLET | Refills: 0 | Status: SHIPPED | OUTPATIENT
Start: 2020-11-10 | End: 2021-01-22

## 2020-11-10 ASSESSMENT — MIFFLIN-ST. JEOR: SCORE: 1276.17

## 2020-11-10 NOTE — PROGRESS NOTES
"Subjective     Sanjuana Salamanca is a 65 year old female who presents to clinic today for the following health issues:    HPI         Hypertension Follow-up      Do you check your blood pressure regularly outside of the clinic? Yes     Are you following a low salt diet? No    Are your blood pressures ever more than 140 on the top number (systolic) OR more   than 90 on the bottom number (diastolic), for example 140/90? Yes      How many servings of fruits and vegetables do you eat daily?  0-1    On average, how many sweetened beverages do you drink each day (Examples: soda, juice, sweet tea, etc.  Do NOT count diet or artificially sweetened beverages)?   0    How many days per week do you exercise enough to make your heart beat faster? 3 or less    How many minutes a day do you exercise enough to make your heart beat faster? 9 or less    How many days per week do you miss taking your medication? 0    She did not take her medications today.   She is here because of persistent Hypokalemia with the hydrochlorothiazide.   Patient prefers to change BP meds rather than take a potassium supplement.     Patient tells me that her daughter has tested positive for COVID last week. Date unknown. Patient last saw her daughter 1 week ago, before she had symptoms.   Patient denies any symptoms of COVID.   Of note patient told the clinic screener she was not in contact with anyone diagnosed with COVID.     Patient feels like her hearing has changed recently.     Review of Systems   Constitutional, HEENT, cardiovascular, pulmonary, gi and gu systems are negative, except as otherwise noted.      Objective    BP (!) 147/84 (BP Location: Left arm, Patient Position: Chair, Cuff Size: Adult Large)   Pulse 97   Temp 98.5  F (36.9  C) (Oral)   Ht 1.6 m (5' 3\")   Wt 76.2 kg (168 lb)   SpO2 98%   Breastfeeding No   BMI 29.76 kg/m    Body mass index is 29.76 kg/m .  Physical Exam   GENERAL: healthy, alert and no distress  HENT: ear canals " and TM's normal with minimal cerumen noted, nose and mouth without ulcers or lesions  RESP: lungs clear to auscultation - no rales, rhonchi or wheezes  CV: regular rate and rhythm, normal S1 S2, no S3 or S4, no murmur,             Assessment & Plan     Hypokalemia  Likely a result of the hydrochlorothiazide. No treatment as we are stopping the hydrochlorothiazide.     Essential hypertension with goal blood pressure less than 140/90  Will change to amlodipine, patient with reaction to ARB in the past. Follow up in 3 weeks  - amLODIPine (NORVASC) 5 MG tablet; Take 1 tablet (5 mg) by mouth daily    Exposure to COVID-19 virus  Discussed that she is considered a contact to a positive case and should be quarantining for 14 days from exposure. Ordered testing. Patient is unsure if she will get the testing.   - Asymptomatic COVID-19 Virus (Coronavirus) by PCR; Future    Bilateral impacted cerumen  - carbamide peroxide (DEBROX) 6.5 % otic solution; Place 5 drops into both ears 2 times daily for 5 days            Return in about 3 weeks (around 12/1/2020) for BP Recheck.    Drea Denise PA-C  [facility]

## 2020-11-10 NOTE — PATIENT INSTRUCTIONS
"STOP hydrochlorothiazide     START AMLODIPINE- follow up in 3 weeks.     Look up at the [facility] for Saliva testing.   Quarantine at home for 14 days from when you saw your daughter.     Discharge Instructions for COVID-19 Patients  You have--or may have--COVID-19. Please follow the instructions listed below.   If you have a weakened immune system, discuss with your doctor any other actions you need to take.  How can I protect others?  If you have symptoms (fever, cough, body aches or trouble breathing):    Stay home and away from others (self-isolate) until:  ? At least 10 days have passed since your symptoms started, And   ? You've had no fever--and no medicine that reduces fever--for 1 full day (24 hours), And    ? Your other symptoms have resolved (gotten better).  If you don't show symptoms, but testing showed that you have COVID-19:    Stay home and away from others (self-isolate). Follow the tips under \"How do I self-isolate?\" below for 10 days (20 days if you have a weak immune system).    You don't need to be retested for COVID-19 before going back to school or work. As long as you're fever-free and feeling better, you can go back to school, work and other activities after waiting the 10 or 20 days.   How do I self-isolate?    Stay in your own room, even for meals. Use your own bathroom if you can.    Stay away from others in your home. No hugging, kissing or shaking hands. No visitors.    Don't go to work, school or anywhere else.    Clean \"high touch\" surfaces often (doorknobs, counters, handles). Use household cleaning spray or wipes. You'll find a full list of  on the EPA website: www.epa.gov/pesticide-registration/list-n-disinfectants-use-against-sars-cov-2.    Cover your mouth and nose with a mask or other face covering to avoid spreading germs.    Wash your hands and face often. Use soap and water.    Caregivers in these groups are at risk for severe illness due to " COVID-19:  ? People 65 years and older  ? People who live in a nursing home or long-term care facility  ? People with chronic disease (lung, heart, cancer, diabetes, kidney, liver, immunologic)  ? People who have a weakened immune system, including those who:    Are in cancer treatment    Take medicine that weakens the immune system, such as corticosteroids    Had a bone marrow or organ transplant    Have an immune deficiency    Have poorly controlled HIV or AIDS    Are obese (body mass index of 40 or higher)    Smoke regularly    Caregivers should wear gloves while washing dishes, handling laundry and cleaning bedrooms and bathrooms.    Use caution when washing and drying laundry: Don't shake dirty laundry and use the warmest water setting that you can.    For more tips on managing your health at home, go to www.cdc.gov/coronavirus/2019-ncov/downloads/10Things.pdf.  How can I take care of myself at home?  1. Get lots of rest. Drink extra fluids (unless a doctor has told you not to).    2. Take Tylenol (acetaminophen) for fever or pain. If you have liver or kidney problems, ask your family doctor if it's okay to take Tylenol.     Adults can take either:  ? 650 mg (two 325 mg pills) every 4 to 6 hours, or   ? 1,000 mg (two 500 mg pills) every 8 hours as needed.  ? Note: Don't take more than 3,000 mg in one day. Acetaminophen is found in many medicines (both prescribed and over-the-counter medicines). Read all labels to be sure you don't take too much.   For children, check the Tylenol bottle for the right dose. The dose is based on the child's age or weight.  3. If you have other health problems (like cancer, heart failure, an organ transplant or severe kidney disease): Call your specialty clinic if you don't feel better in the next 2 days.    4. Know when to call 911. Emergency warning signs include:  ? Trouble breathing or shortness of breath  ? Pain or pressure in the chest that doesn't go away  ? Feeling confused  like you haven't felt before, or not being able to wake up  ? Bluish-colored lips or face    5. Your doctor may have prescribed a blood thinner medicine. Follow their instructions.  Where can I get more information?    [facility] - About COVID-19: [email or web address]    CDC - What to Do If You're Sick: www.cdc.gov/coronavirus/2019-ncov/about/steps-when-sick.html    CDC - Ending Home Isolation: www.cdc.gov/coronavirus/2019-ncov/hcp/disposition-in-home-patients.html    [facility] - Caring for Someone: www.cdc.gov/coronavirus/2019-ncov/if-you-are-sick/care-for-someone.html    [facility] - Interim Guidance for Hospital Discharge to Home: www.[facility].[place].us/diseases/coronavirus/hcp/hospdischarge.pdf    [facility] clinical trials (COVID-19 research studies): clinicalaffairs.[email or web address]/[email or web address]-clinical-trials    Below are the COVID-19 hotlines at the [place] [facility] ([facility]). Interpreters are available.  ? For health questions: Call 311-749-1201 or 1-571.493.4713 (7 a.m. to 7 p.m.)  ? For questions about schools and childcare: Call 877-453-7889 or 1-872.496.5592 (7 a.m. to 7 p.m.)    For informational purposes only. Not to replace the advice of your health care provider. Clinically reviewed by the Infection Prevention Team. Copyright   2020 [facility]. All rights reserved. [place] 284269 - REV 08/04/20.

## 2020-11-18 ENCOUNTER — OFFICE VISIT (OUTPATIENT)
Dept: OPHTHALMOLOGY | Facility: CLINIC | Age: 65
End: 2020-11-18
Attending: PHYSICIAN ASSISTANT
Payer: COMMERCIAL

## 2020-11-18 DIAGNOSIS — H52.223 REGULAR ASTIGMATISM OF BOTH EYES: ICD-10-CM

## 2020-11-18 DIAGNOSIS — H52.03 HYPEROPIA OF BOTH EYES: ICD-10-CM

## 2020-11-18 DIAGNOSIS — H04.123 DRY EYE SYNDROME, BILATERAL: ICD-10-CM

## 2020-11-18 DIAGNOSIS — H25.813 COMBINED FORMS OF AGE-RELATED CATARACT OF BOTH EYES: ICD-10-CM

## 2020-11-18 DIAGNOSIS — H52.4 PRESBYOPIA: ICD-10-CM

## 2020-11-18 DIAGNOSIS — Z01.00 EXAMINATION OF EYES AND VISION: Primary | ICD-10-CM

## 2020-11-18 PROCEDURE — 92015 DETERMINE REFRACTIVE STATE: CPT | Performed by: STUDENT IN AN ORGANIZED HEALTH CARE EDUCATION/TRAINING PROGRAM

## 2020-11-18 PROCEDURE — 92014 COMPRE OPH EXAM EST PT 1/>: CPT | Performed by: STUDENT IN AN ORGANIZED HEALTH CARE EDUCATION/TRAINING PROGRAM

## 2020-11-18 ASSESSMENT — SLIT LAMP EXAM - LIDS
COMMENTS: 1+ DERMATOCHALASIS, 1+ MEIBOMIAN GLAND DYSFUNCTION
COMMENTS: 2+ DERMATOCHALASIS, 1+ MEIBOMIAN GLAND DYSFUNCTION

## 2020-11-18 ASSESSMENT — REFRACTION_WEARINGRX
OS_CYLINDER: SPHERE
OD_ADD: +2.50
OD_SPHERE: +0.25
OD_AXIS: 177
OS_SPHERE: +0.50
OD_CYLINDER: +0.50
OS_ADD: +2.50

## 2020-11-18 ASSESSMENT — REFRACTION_MANIFEST
OS_SPHERE: +0.50
OS_ADD: +2.50
OS_AXIS: 170
OD_ADD: +2.50
OD_SPHERE: +0.50
OD_AXIS: 180
OS_CYLINDER: +0.25
OD_CYLINDER: +0.50

## 2020-11-18 ASSESSMENT — CUP TO DISC RATIO
OD_RATIO: 0.2
OS_RATIO: 0.15

## 2020-11-18 ASSESSMENT — CONF VISUAL FIELD
OS_NORMAL: 1
METHOD: COUNTING FINGERS
OD_NORMAL: 1

## 2020-11-18 ASSESSMENT — VISUAL ACUITY
CORRECTION_TYPE: GLASSES
METHOD: SNELLEN - LINEAR
OS_CC+: -2
OD_CC+: -2
OD_CC: 20/30
METHOD_MR: SLOW BOTH EYES
OS_CC: 20/30

## 2020-11-18 ASSESSMENT — TONOMETRY
IOP_METHOD: APPLANATION
OD_IOP_MMHG: 15
OS_IOP_MMHG: 15

## 2020-11-18 ASSESSMENT — EXTERNAL EXAM - LEFT EYE: OS_EXAM: NORMAL

## 2020-11-18 ASSESSMENT — EXTERNAL EXAM - RIGHT EYE: OD_EXAM: NORMAL

## 2020-11-18 NOTE — PATIENT INSTRUCTIONS
"Use artificial tears up to four times a day (like Refresh Optive, Systane Balance, TheraTears, or generic artificial tears are ok. Avoid \"get the red out\" drops).    Glasses prescription given    Johnathan Wallace MD  [phone]    "

## 2020-11-18 NOTE — LETTER
"    11/18/2020         RE: Sanjuana Salamanca  [address]  [address]  [place] [address]        Dear Colleague,    Thank you for referring your patient, Sanjuana Salamanca, to the [facility]. Please see a copy of my visit note below.     Current Eye Medications:  None     Subjective:  Complete eye exam also having watering and redness sometimes for last month. Vision is doing well in distance both eyes. Patient having trouble at near for last month with glasses both eyes. Not having any eye pain in either eye. Starting to have some difficulty with night driving.     Objective:  See Ophthalmology Exam.       Assessment:  Sanjuana Salamanca is a 65 year old female who presents with:   Encounter Diagnoses   Name Primary?     Examination of eyes and vision      Hyperopia of both eyes      Regular astigmatism of both eyes      Presbyopia        Combined forms of age-related cataract of both eyes Early visually significant      Dry eye syndrome, bilateral        Plan:  Use artificial tears up to four times a day (like Refresh Optive, Systane Balance, TheraTears, or generic artificial tears are ok. Avoid \"get the red out\" drops).    Glasses prescription given    Johnathan Wallace MD  [phone]          Again, thank you for allowing me to participate in the care of your patient.        Sincerely,        Johnathan Wallace MD    "

## 2020-11-18 NOTE — PROGRESS NOTES
" Current Eye Medications:  None     Subjective:  Complete eye exam also having watering and redness sometimes for last month. Vision is doing well in distance both eyes. Patient having trouble at near for last month with glasses both eyes. Not having any eye pain in either eye. Starting to have some difficulty with night driving.     Objective:  See Ophthalmology Exam.       Assessment:  Sanjuana Salamanca is a 65 year old female who presents with:   Encounter Diagnoses   Name Primary?     Examination of eyes and vision      Hyperopia of both eyes      Regular astigmatism of both eyes      Presbyopia        Combined forms of age-related cataract of both eyes Early visually significant      Dry eye syndrome, bilateral        Plan:  Use artificial tears up to four times a day (like Refresh Optive, Systane Balance, TheraTears, or generic artificial tears are ok. Avoid \"get the red out\" drops).    Glasses prescription given    Johnathan Wallace MD  [phone]      "

## 2020-12-07 DIAGNOSIS — M17.9 OSTEOARTHRITIS OF KNEE, UNSPECIFIED LATERALITY, UNSPECIFIED OSTEOARTHRITIS TYPE: ICD-10-CM

## 2020-12-08 RX ORDER — DICLOFENAC SODIUM 75 MG/1
75 TABLET, DELAYED RELEASE ORAL 2 TIMES DAILY PRN
Qty: 60 TABLET | Refills: 1 | Status: SHIPPED | OUTPATIENT
Start: 2020-12-08 | End: 2021-01-05

## 2020-12-08 NOTE — TELEPHONE ENCOUNTER
"Requested Prescriptions   Pending Prescriptions Disp Refills    diclofenac (VOLTAREN) 75 MG EC tablet 60 tablet 1     Sig: Take 1 tablet (75 mg) by mouth 2 times daily as needed for moderate pain       NSAID Medications Failed - 12/7/2020  9:27 AM        Failed - Blood pressure under 140/90 in past 12 months     BP Readings from Last 3 Encounters:   11/10/20 (!) 147/84   10/12/20 135/86   12/05/19 (!) 142/85                 Failed - Patient is age 6-64 years        Passed - Normal ALT on file in past 12 months     Recent Labs   Lab Test 10/12/20  1457   ALT 26             Passed - Normal AST on file in past 12 months     Recent Labs   Lab Test 10/12/20  1457   AST 19             Passed - Recent (12 mo) or future (30 days) visit within the authorizing provider's specialty     Patient has had an office visit with the authorizing provider or a provider within the authorizing providers department within the previous 12 mos or has a future within next 30 days. See \"Patient Info\" tab in inbasket, or \"Choose Columns\" in Meds & Orders section of the refill encounter.              Passed - Normal CBC on file in past 12 months     Recent Labs   Lab Test 10/12/20  1457   WBC 8.7   RBC 4.61   HGB 13.0   HCT 39.7                    Passed - Medication is active on med list        Passed - No active pregnancy on record        Passed - Normal serum creatinine on file in past 12 months     Recent Labs   Lab Test 10/12/20  1457   CR 0.61       Ok to refill medication if creatinine is low          Passed - No positive pregnancy test in past 12 months           Routing refill request to provider for review/approval because:  Failed protocol.  Taylor Michaud BSN-RN  [facility]    "

## 2020-12-09 ENCOUNTER — APPOINTMENT (OUTPATIENT)
Dept: OPTOMETRY | Facility: CLINIC | Age: 65
End: 2020-12-09
Payer: COMMERCIAL

## 2020-12-09 PROCEDURE — 92340 FIT SPECTACLES MONOFOCAL: CPT | Performed by: STUDENT IN AN ORGANIZED HEALTH CARE EDUCATION/TRAINING PROGRAM

## 2020-12-21 NOTE — PROGRESS NOTES
History of Present Illness - Sanjuana Salamanca is a 62 year old female who was seen in the past by Dr. Mckinney for tinnitus, now presents with concerns about a cough. The cough has been intermittent and usually nonproductive. She has tried Guaifenesin with codeine. She also has an allergy workup pending. Patient reports that the cough has been intermittent for the past 2-3 years. Patient reports that normally she has 1 episode lasting 1 month per year, this year she has 2 month long episodes.      Past Medical History -   Patient Active Problem List   Diagnosis     Avitaminosis D     Esophageal reflux     OA (osteoarthritis) of knee     Advanced directives, counseling/discussion     Hyperlipidemia LDL goal <160     Cataracts, both eyes     Dry eyes     Hypokalemia     Prediabetes     Essential hypertension with goal blood pressure less than 140/90     Current Medications -   Current Outpatient Prescriptions:      montelukast (SINGULAIR) 10 MG tablet, Take 1 tablet (10 mg) by mouth At Bedtime, Disp: 90 tablet, Rfl: 0     ranitidine (ZANTAC) 150 MG tablet, Take 1 tablet (150 mg) by mouth 2 times daily, Disp: 180 tablet, Rfl: 1     acetaminophen (TYLENOL) 325 MG tablet, Take 1-2 tablets (325-650 mg) by mouth every 6 hours as needed for mild pain, Disp: 100 tablet, Rfl: 3     diclofenac (VOLTAREN) 50 MG EC tablet, TAKE 1 TABLET(50 MG) BY MOUTH THREE TIMES DAILY AS NEEDED FOR MODERATE PAIN, Disp: 90 tablet, Rfl: 0     Multiple Vitamins-Minerals (MULTI COMPLETE) CAPS, Take 1 capsule by mouth daily, Disp: 100 capsule, Rfl: 3     metoprolol (LOPRESSOR) 25 MG tablet, Take 1 tablet (25 mg) by mouth 2 times daily, Disp: 180 tablet, Rfl: 0     fluticasone (FLOVENT HFA) 110 MCG/ACT Inhaler, Inhale 2 puffs into the lungs 2 times daily, Disp: 1 Inhaler, Rfl: 1     albuterol (PROAIR HFA/PROVENTIL HFA/VENTOLIN HFA) 108 (90 BASE) MCG/ACT Inhaler, Inhale 2 puffs into the lungs every 4 hours as needed, Disp: 1 Inhaler, Rfl: 1     selenium  Refill request for   hydrOXYzine (ATARAX) 25 MG tablet (Discontinued) 60 tablet 1 6/16/2020 8/18/2020       LOV 09/11/20  NOV none  Last fill date per 10/30/2020    Medication sent to PCP per protocol     sulfide (SELSUN) 2.5 % lotion, Apply to affected area and lather with small amounts of water; leave on skin for 10 minutes, then rinse thoroughly; repeat once every day for 7 days for 8 weeks., Disp: 118 mL, Rfl: 1     cholecalciferol (VITAMIN D3) 1000 UNIT tablet, Take 1 tablet (1,000 Units) by mouth daily, Disp: 100 tablet, Rfl: 3     order for DME, Use as directed., Disp: 1 kit, Rfl: 0     polyethylene glycol (MIRALAX) powder, Take 17 g (1 capful) by mouth daily, Disp: 510 g, Rfl: 1     azithromycin (ZITHROMAX) 250 MG tablet, Two tablets first day, then one tablet daily for four days. (Patient not taking: Reported on 12/21/2017), Disp: 6 tablet, Rfl: 0     guaiFENesin-codeine (ROBITUSSIN AC) 100-10 MG/5ML SOLN solution, Take 5 mLs by mouth nightly as needed for cough (Patient not taking: Reported on 12/21/2017), Disp: 120 mL, Rfl: 0    Allergies -   Allergies   Allergen Reactions     Valsartan Cough       Social History -   Social History     Social History     Marital status:      Spouse name: N/A     Number of children: N/A     Years of education: N/A     Social History Main Topics     Smoking status: Never Smoker     Smokeless tobacco: Never Used     Alcohol use No     Drug use: No     Sexual activity: No     Other Topics Concern     Parent/Sibling W/ Cabg, Mi Or Angioplasty Before 65f 55m? No     Social History Narrative     Family History -   Family History   Problem Relation Age of Onset     Hypertension Mother      Thyroid Disease Mother      DIABETES Other      CEREBROVASCULAR DISEASE Other      Glaucoma No family hx of      Macular Degeneration No family hx of      CANCER No family hx of        Review of Systems - As per HPI and PMHx, otherwise 7 system review of the head and neck negative. 10+ system review negative.    Physical Exam  BP (!) 156/91 (Patient Position: Chair, Cuff Size: Adult Regular)  Pulse 78  Wt 77.8 kg (171 lb 9.6 oz)  BMI 30.16 kg/m2  General - The patient is well nourished  and well developed, and appears to have good nutritional status.  Alert and oriented to person and place, answers questions and cooperates with examination appropriately.   Head and Face - Normocephalic and atraumatic, with no gross asymmetry noted of the contour of the facial features.  The facial nerve is intact, with strong symmetric movements.  Voice and Breathing - The patient was breathing comfortably without the use of accessory muscles. There was no wheezing, stridor, or stertor.  The patients voice was clear and strong, and had appropriate pitch and quality.  Ears - Bilateral pinna and EACs with normal appearing overlying skin. Tympanic membrane intact with good mobility on pneumatic otoscopy bilaterally. Bony landmarks of the ossicular chain are normal. The tympanic membranes are normal in appearance. No retraction, perforation, or masses.  No fluid or purulence was seen in the external canal or the middle ear. Cerumen on left TM.  Eyes - Extraocular movements intact.  Sclera were not icteric or injected, conjunctiva were pink and moist.  Mouth - Examination of the oral cavity showed pink, healthy oral mucosa. No lesions or ulcerations noted.  The tongue was mobile and midline, and the dentition were in good condition.    Throat - The walls of the oropharynx were smooth, pink, moist, symmetric, and had no lesions or ulcerations.  The tonsillar pillars and soft palate were symmetric.  The uvula was midline on elevation.  Neck - Normal midline excursion of the laryngotracheal complex during swallowing.  Full range of motion on passive movement.  Palpation of the occipital, submental, submandibular, internal jugular chain, and supraclavicular nodes did not demonstrate any abnormal lymph nodes or masses.  The carotid pulse was palpable bilaterally.  Palpation of the thyroid was soft and smooth, with no nodules or goiter appreciated.  The trachea was mobile and midline.  Nose - External contour is symmetric, no  gross deflection or scars.  Nasal mucosa is pink and moist with no abnormal mucus.  The septum was midline and non-obstructive, turbinates of normal size and position.  No polyps, masses, or purulence noted on examination.      Procedure: Flexible Endoscopy  Indication: Chronic cough    Attempts at mirror laryngoscopy were not possible due to gag reflex.  Therefore I proceeded with a fiberoptic examination.  First I sprayed both sides of the nose with a mixture of lidocaine and neosynephrine.  I then passed the scope through the nasal cavity. The nasal cavity was unremarkable. The nasopharynx was mucosally covered and symmetric.  The Eustachian tube openings were unobstructed. Going further down I had a clear view of the base of tongue which had normal appearing lingual tonsillar tissue. The base of tongue was free of lesions, and the vallecula was open. The epiglottis was smooth and mucosally covered.  The supraglottic larynx was then clearly visualized. The vocal cords moved smoothly and symmetrically, they were pearly white and no lesions were seen. The pyriform sinuses were open, and the limited view of the postcricoid region did not show any lesions.      Assessment - Sanjuana Salamanca is a 62 year old female with chronic cough, but normal laryngeal exam. Discussed the possibility of chronic sinusitis that would be diagnosed by a CT scan of the sinuses, but she is not interested in any surgical workup for this issue, so we declined to move forward with CT. I suspect she likely is having postviral cough syndrome as it seems to occur sporadically 1-2 times per year and last around a month. I encouraged her to follow through with the allergy workup. Should there be further concern for sinus disease, a CT Sinus could be ordered at that time.      This document serves as a record of the services and decisions personally performed and made by Dr. Adriana Torres MD. It was created on his behalf by Ping Jack, a trained  medical scribe. The creation of this document is based the provider's statements to the medical scribe.  Ping Jack 3:11 PM 12/21/2017    Provider:   The information in this document, created by the medical scribe for me, accurately reflects the services I personally performed and the decisions made by me. I have reviewed and approved this document for accuracy prior to leaving the patient care area.  Dr. Adriana Torres MD 3:11 PM 12/21/2017    Dr. Adriana Torres MD  Otolaryngology  [facility]

## 2020-12-31 ENCOUNTER — NURSE TRIAGE (OUTPATIENT)
Dept: NURSING | Facility: CLINIC | Age: 65
End: 2020-12-31

## 2020-12-31 NOTE — TELEPHONE ENCOUNTER
Please call patient today.  She declines triage. She wants to follow Drea Densie PA-C, she didn't get a letter from her as to where she's moving to. She is having dizziness but wouldn't continue the triage. Call her at:  [phone].  Thank you,  Denae Hyde RN  [facility]      Additional Information    Negative: Shock suspected (e.g., cold/pale/clammy skin, too weak to stand, low BP, rapid pulse)    Negative: Difficult to awaken or acting confused (e.g., disoriented, slurred speech)    Negative: Fainted, and still feels dizzy afterwards    Negative: Severe difficulty breathing (e.g., struggling for each breath, speaks in single words)    Negative: Overdose (accidental or intentional) of medications    Negative: New neurologic deficit that is present now: * Weakness of the face, arm, or leg on one side of the body * Numbness of the face, arm, or leg on one side of the body * Loss of speech or garbled speech    Negative: Heart beating < 50 beats per minute OR > 140 beats per minute    Negative: Sounds like a life-threatening emergency to the triager    Negative: Chest pain    Negative: Rectal bleeding, bloody stool, or tarry-black stool    Negative: Vomiting is the main symptom    Negative: Diarrhea is the main symptom    Negative: Headache is the main symptom    Negative: Heat exhaustion suspected (i.e., dehydration from heat exposure)    Negative: Patient states that he/she is having an anxiety/panic attack    Negative: Nursing judgment    Negative: Nursing judgment    Negative: Nursing judgment    Nursing judgment    Protocols used: DIZZINESS-A-OH, NO PROTOCOL AVAILABLE - INFORMATION ONLY-A-OH

## 2020-12-31 NOTE — TELEPHONE ENCOUNTER
Patient was last seen 11/10/20 by Drea Denise.    Plan:  Instructions       Return in about 3 weeks (around 12/1/2020) for BP Recheck.  STOP hydrochlorothiazide      START AMLODIPINE- follow up in 3 weeks.         BP Readings from Last 3 Encounters:   11/10/20 (!) 147/84   10/12/20 135/86   12/05/19 (!) 142/85     Potassium   Date Value Ref Range Status   11/03/2020 3.1 (L) 3.4 - 5.3 mmol/L Final         Attempted to call patient at home/mobile number, no answer, left message on voicemail; patient was instructed to return call to [facility] at [phone].    I did briefly advise her on the message that she should schedule a visit in clinic with Drea Denise.    Kim Reardon RN  [facility]

## 2020-12-31 NOTE — TELEPHONE ENCOUNTER
"Patient's daughter calling to schedule appointment for patient with PCP for dizziness.   Transferred to triage.   See RN Protocol, Assessment, Care Advise and Disposition.    Monique VARGAS RN,BSN          Additional Information    Patient wants to be seen    Lightheadedness (dizziness) present now, after 2 hours of rest and fluids    Negative: Spinning or tilting sensation (vertigo) present now    Negative: Fever > 103 F (39.4 C)    Negative: Fever > 100.0 F (37.8 C) and has diabetes mellitus or a weak immune system (e.g., HIV positive, cancer chemotherapy, organ transplant, splenectomy, chronic steroids)    Negative: SEVERE dizziness (e.g., unable to stand, requires support to walk, feels like passing out now)    Negative: SEVERE headache or neck pain    Negative: Spinning or tilting sensation (vertigo) present now and one or more stroke risk factors (i.e., hypertension, diabetes, prior stroke/TIA, heart attack, age over 60) (Exception: prior physician evaluation for this AND no different/worse than usual)    Negative: Loss of vision or double vision    Negative: Extra heart beats OR irregular heart beating (i.e., 'palpitations')    Negative: Difficulty breathing    Negative: Drinking very little and has signs of dehydration (e.g., no urine > 12 hours, very dry mouth, very lightheaded)    Negative: Follows bleeding (e.g., stomach, rectum, vagina) (Exception: became dizzy from sight of small amount blood)    Negative: Patient sounds very sick or weak to the triager    Answer Assessment - Initial Assessment Questions  1. DESCRIPTION: \"Describe your dizziness.\"      Room spins  2. LIGHTHEADED: \"Do you feel lightheaded?\" (e.g., somewhat faint, woozy, weak upon standing)      Weak upon standing (uses support of others), woozy  3. VERTIGO: \"Do you feel like either you or the room is spinning or tilting?\" (i.e. vertigo)      Room spinning  4. SEVERITY: \"How bad is it?\"  \"Do you feel like you are going to faint?\" \"Can you " "stand and walk?\"    - MILD - walking normally    - MODERATE - interferes with normal activities (e.g., work, school)     - SEVERE - unable to stand, requires support to walk, feels like passing out now.       Moderate/severe  5. ONSET:  \"When did the dizziness begin?\"      2 days ago.  Also reports similar symptoms x 2-3 wks ago lasting 3-4 days  6. AGGRAVATING FACTORS: \"Does anything make it worse?\" (e.g., standing, change in head position)      Yes--changing head position and standing  7. HEART RATE: \"Can you tell me your heart rate?\" \"How many beats in 15 seconds?\"  (Note: not all patients can do this)        Reports normal beat currently  8. CAUSE: \"What do you think is causing the dizziness?\"      ?  9. RECURRENT SYMPTOM: \"Have you had dizziness before?\" If so, ask: \"When was the last time?\" \"What happened that time?\"      2-3 wks ago  10. OTHER SYMPTOMS: \"Do you have any other symptoms?\" (e.g., fever, chest pain, vomiting, diarrhea, bleeding)        Denies all above    Protocols used: DIZZINESS-A-OH    "

## 2021-01-05 ENCOUNTER — OFFICE VISIT (OUTPATIENT)
Dept: FAMILY MEDICINE | Facility: CLINIC | Age: 66
End: 2021-01-05
Payer: COMMERCIAL

## 2021-01-05 VITALS
BODY MASS INDEX: 29.23 KG/M2 | HEART RATE: 93 BPM | WEIGHT: 165 LBS | SYSTOLIC BLOOD PRESSURE: 138 MMHG | TEMPERATURE: 97.7 F | HEIGHT: 63 IN | DIASTOLIC BLOOD PRESSURE: 86 MMHG | OXYGEN SATURATION: 97 %

## 2021-01-05 DIAGNOSIS — M17.9 OSTEOARTHRITIS OF KNEE, UNSPECIFIED LATERALITY, UNSPECIFIED OSTEOARTHRITIS TYPE: ICD-10-CM

## 2021-01-05 DIAGNOSIS — R42 VERTIGO: Primary | ICD-10-CM

## 2021-01-05 LAB
ANION GAP SERPL CALCULATED.3IONS-SCNC: 7 MMOL/L (ref 3–14)
BUN SERPL-MCNC: 18 MG/DL (ref 7–30)
CALCIUM SERPL-MCNC: 9.9 MG/DL (ref 8.5–10.1)
CHLORIDE SERPL-SCNC: 99 MMOL/L (ref 94–109)
CO2 SERPL-SCNC: 32 MMOL/L (ref 20–32)
CREAT SERPL-MCNC: 0.62 MG/DL (ref 0.52–1.04)
GFR SERPL CREATININE-BSD FRML MDRD: >90 ML/MIN/{1.73_M2}
GLUCOSE SERPL-MCNC: 106 MG/DL (ref 70–99)
POTASSIUM SERPL-SCNC: 2.7 MMOL/L (ref 3.4–5.3)
SODIUM SERPL-SCNC: 138 MMOL/L (ref 133–144)

## 2021-01-05 PROCEDURE — 99214 OFFICE O/P EST MOD 30 MIN: CPT | Performed by: PHYSICIAN ASSISTANT

## 2021-01-05 PROCEDURE — 80048 BASIC METABOLIC PNL TOTAL CA: CPT | Performed by: PHYSICIAN ASSISTANT

## 2021-01-05 PROCEDURE — 36415 COLL VENOUS BLD VENIPUNCTURE: CPT | Performed by: PHYSICIAN ASSISTANT

## 2021-01-05 RX ORDER — DICLOFENAC SODIUM 75 MG/1
75 TABLET, DELAYED RELEASE ORAL 2 TIMES DAILY PRN
Qty: 60 TABLET | Refills: 1 | Status: SHIPPED | OUTPATIENT
Start: 2021-01-05 | End: 2021-04-16

## 2021-01-05 RX ORDER — FLUTICASONE PROPIONATE 50 MCG
1 SPRAY, SUSPENSION (ML) NASAL 2 TIMES DAILY
Qty: 16 G | Refills: 0 | Status: SHIPPED | OUTPATIENT
Start: 2021-01-05 | End: 2021-02-02

## 2021-01-05 RX ORDER — MECLIZINE HYDROCHLORIDE 25 MG/1
25 TABLET ORAL 3 TIMES DAILY PRN
Qty: 30 TABLET | Refills: 0 | Status: SHIPPED | OUTPATIENT
Start: 2021-01-05 | End: 2021-01-22

## 2021-01-05 ASSESSMENT — MIFFLIN-ST. JEOR: SCORE: 1257.57

## 2021-01-05 NOTE — PROGRESS NOTES
Assessment & Plan     Vertigo  Trial of meclizine and flonase. If not improving in 1 week contact clinic.   - Basic metabolic panel  - meclizine (ANTIVERT) 25 MG tablet; Take 1 tablet (25 mg) by mouth 3 times daily as needed for dizziness  - fluticasone (FLONASE) 50 MCG/ACT nasal spray; Spray 1 spray into both nostrils 2 times daily for 14 days    Osteoarthritis of knee, unspecified laterality, unspecified osteoarthritis type  Refills given.   - diclofenac (VOLTAREN) 75 MG EC tablet; Take 1 tablet (75 mg) by mouth 2 times daily as needed for moderate pain                           No follow-ups on file.    PRATIMA Galicia [facility]    Subjective     Sanjuana Salamanca is a 66 year old who presents to clinic today for the following health issues     HPI       Dizziness  Onset/Duration: over a month  Description:   Do you feel faint: YES  Does it feel like the surroundings (bed, room) are moving: YES  Unsteady/off balance: YES  Have you passed out or fallen: no  Intensity: moderate, severe  Progression of Symptoms: pt stated it is getting a little bit better  Accompanying Signs & Symptoms:  Heart palpitations or chest pain: no  Nausea, vomiting: no  Weakness or lack of coordination in arms or legs: no  Vision or speech changes: no  Numbness or tingling: no  Ringing in ears (Tinnitus): no  Hearing Loss: no  History:   Head trauma/concussion history: no  Previous similar symptoms: YES  Recent bleeding history: no  Any new medications (BP?): no  Precipitating factors:   Worse with activity: YES  Worse with head movement: YES  Alleviating factors:   Does staying in a fixed position give relief: YES  Therapies tried and outcome: None      When she lays down the house is spinning. Every time she lays down.   Has been improving over the last 3 days.   No chest pain or palpitations.   No headaches.   Hs been taking her blood pressure meds daily.         Review of Systems   Constitutional, HEENT,  "cardiovascular, pulmonary, gi and gu systems are negative, except as otherwise noted.      Objective    /86 (BP Location: Left arm, Patient Position: Chair, Cuff Size: Adult Large)   Pulse 93   Temp 97.7  F (36.5  C) (Oral)   Ht 1.6 m (5' 3\")   Wt 74.8 kg (165 lb)   SpO2 97%   Breastfeeding No   BMI 29.23 kg/m    Body mass index is 29.23 kg/m .  Physical Exam   GENERAL: healthy, alert and no distress  EYES: Eyes grossly normal to inspection, PERRL and conjunctivae and sclerae normal  HENT: ear canals and TM's normal, nose and mouth without ulcers or lesions  NECK: no adenopathy, no asymmetry, masses, or scars and thyroid normal to palpation  RESP: lungs clear to auscultation - no rales, rhonchi or wheezes  CV: regular rate and rhythm, normal S1 S2, no S3 or S4, no murmur, click or rub, no peripheral edema and peripheral pulses strong  NEURO: Normal strength and tone, mentation intact and speech normal, CN II-XII intact.   PSYCH: mentation appears normal, affect normal/bright                "

## 2021-01-06 ENCOUNTER — TELEPHONE (OUTPATIENT)
Dept: FAMILY MEDICINE | Facility: CLINIC | Age: 66
End: 2021-01-06

## 2021-01-06 DIAGNOSIS — E87.6 HYPOKALEMIA: Primary | ICD-10-CM

## 2021-01-06 RX ORDER — POTASSIUM CHLORIDE 750 MG/1
10 TABLET, EXTENDED RELEASE ORAL DAILY
Qty: 30 TABLET | Refills: 1 | Status: SHIPPED | OUTPATIENT
Start: 2021-01-06 | End: 2021-02-22

## 2021-01-06 NOTE — TELEPHONE ENCOUNTER
Please call patient. Her potassium remains low. I would like her to start 10meq of Potassium daily. She should have a lab only appointment in 2 weeks to recheck this.   Drea Denise PA-C

## 2021-01-06 NOTE — TELEPHONE ENCOUNTER
Attempt # 1  Called [phone] (home)       Did patient answer the phone: No, left a message on voicemail to return call to the [facility] at [phone].    MABEL LuqueN,RN  [facility]

## 2021-01-06 NOTE — TELEPHONE ENCOUNTER
Patient's daughter is calling back.  Message is relayed to family.  Daughter prefers to follow up with PCP in 2 weeks and then complete lab after the appointment in case additional labs need to be ordered.    Appointment is made for 1/22/2021.  Closing this encounter.  Drea Landa, MABELN, RN

## 2021-01-22 ENCOUNTER — OFFICE VISIT (OUTPATIENT)
Dept: FAMILY MEDICINE | Facility: CLINIC | Age: 66
End: 2021-01-22
Payer: COMMERCIAL

## 2021-01-22 VITALS
BODY MASS INDEX: 29.76 KG/M2 | HEART RATE: 85 BPM | DIASTOLIC BLOOD PRESSURE: 94 MMHG | WEIGHT: 168 LBS | OXYGEN SATURATION: 95 % | TEMPERATURE: 97.8 F | SYSTOLIC BLOOD PRESSURE: 138 MMHG

## 2021-01-22 DIAGNOSIS — I10 ESSENTIAL HYPERTENSION WITH GOAL BLOOD PRESSURE LESS THAN 140/90: ICD-10-CM

## 2021-01-22 DIAGNOSIS — E55.9 AVITAMINOSIS D: ICD-10-CM

## 2021-01-22 DIAGNOSIS — E87.6 HYPOKALEMIA: Primary | ICD-10-CM

## 2021-01-22 PROCEDURE — 99214 OFFICE O/P EST MOD 30 MIN: CPT | Performed by: PHYSICIAN ASSISTANT

## 2021-01-22 RX ORDER — AMLODIPINE BESYLATE 5 MG/1
5 TABLET ORAL DAILY
Qty: 90 TABLET | Refills: 1 | Status: SHIPPED | OUTPATIENT
Start: 2021-01-22 | End: 2021-08-20

## 2021-01-22 NOTE — PROGRESS NOTES
Assessment & Plan     Essential hypertension with goal blood pressure less than 140/90  Uncontrolled. Patient did not take meds today. Will repeat at next office visit.   - amLODIPine (NORVASC) 5 MG tablet; Take 1 tablet (5 mg) by mouth daily    Avitaminosis D  Refilled.   - cholecalciferol 25 MCG (1000 UT) TABS; Take 1,000 Units by mouth daily  Hypokalemia  Patient feels better with supplementation. Continue taking. Emphasized need for labs, patient refuses today will schedule later.                              Return for Lab Only.    Drea Denise PA-C  [facility] MADHURI Wei is a 66 year old who presents to clinic today for the following health issues     HPI       Pt is here to follow-up on dizziness. Pt reports that she is doing so much better since she has been taking potassium pills.     Dizziness went away within 2-3 days. Has been taking the potassium daily. Does not want to get labs today. Will make a lab only appointment.     Has not taken her blood pressure meds today yet.     Review of Systems   Constitutional, HEENT, cardiovascular, pulmonary, gi and gu systems are negative, except as otherwise noted.      Objective    BP (!) 163/83 (BP Location: Left arm, Patient Position: Chair, Cuff Size: Adult Large)   Pulse 85   Temp 97.8  F (36.6  C) (Oral)   Wt 76.2 kg (168 lb)   SpO2 95%   Breastfeeding No   BMI 29.76 kg/m    Body mass index is 29.76 kg/m .  Physical Exam   GENERAL: healthy, alert and no distress  HENT: ear canals and TM's normal,   CV: regular rate and rhythm, normal S1 S2, no S3 or S4, no murmur,

## 2021-01-23 ASSESSMENT — ASTHMA QUESTIONNAIRES: ACT_TOTALSCORE: 25

## 2021-01-26 ENCOUNTER — PATIENT OUTREACH (OUTPATIENT)
Dept: GERIATRIC MEDICINE | Facility: CLINIC | Age: 66
End: 2021-01-26

## 2021-01-26 NOTE — PROGRESS NOTES
[facility] Care Coordination Contact    [facility] Refusal Telephone Assessment    Member refused home visit HRA on 01/26/21. Member does not wish to participate in a telephone assessment at this time.     ER visits: No  Hospitalizations: No  Health concerns: Denies  Falls/Injuries: No  ADL/IADL Dependencies: None   Member currently receiving the following services: None    Informal support(s):  Adult children.    Advanced Care Planning discussion, complete code section.    Follow-Up Plan: Member informed of future contact, plan to f/u with member with a 6 month telephone assessment and offer a home visit.  Contact information shared with member and family, encouraged member to call with any questions or concerns at any time.    Ruth Long RN BSN PHN  [facility] Care Coordinator  [phone]  Fax:[phone]

## 2021-02-01 DIAGNOSIS — R42 VERTIGO: ICD-10-CM

## 2021-02-02 RX ORDER — FLUTICASONE PROPIONATE 50 MCG
SPRAY, SUSPENSION (ML) NASAL
Qty: 16 G | Refills: 0 | Status: SHIPPED | OUTPATIENT
Start: 2021-02-02 | End: 2021-03-04

## 2021-02-02 NOTE — CONFIDENTIAL NOTE
Routing refill request to provider for review/approval because:  Drug not active on patient's medication list        
Bariatric Surgery

## 2021-02-05 DIAGNOSIS — I10 ESSENTIAL HYPERTENSION WITH GOAL BLOOD PRESSURE LESS THAN 140/90: ICD-10-CM

## 2021-02-08 RX ORDER — AMLODIPINE BESYLATE 5 MG/1
TABLET ORAL
Qty: 90 TABLET | Refills: 1 | OUTPATIENT
Start: 2021-02-08

## 2021-02-17 NOTE — MR AVS SNAPSHOT
After Visit Summary   12/21/2017    Sanjuana Salamanca    MRN: [id number]           Patient Information     Date Of Birth          1955        Visit Information        Provider Department      12/21/2017 2:45 PM Adriana Torres MD [facility] [place]        Today's Diagnoses     Chronic cough    -  1      Care Instructions    Scheduling Information  To schedule your CT/MRI scan, please contact [facility] at [phone] OR [facility] at [phone]    To schedule your Surgery, please contact our Specialty Schedulers at [phone]    ** If a CT scan or biopsy were ordered/done, Dr. Torres will need to see you back in clinic to go over your biopsy results or CT/MRI scan. He will go over the images from your scan with you and discuss treatment based on your results.     ENT Clinic Locations Clinic Hours Telephone Number     [facility] [place]  [address]  [place] [address]   E/O Thursday:      7:30am -- 4:00pm   To schedule/reschedule an appointment with   Dr. Torres,   please contact our   Specialty Scheduling Department at:     [phone]       [facility]  [address] [place] [address]     Monday:              12:00pm -- 4:00pm    Tuesday:               8:30am -- 4:30pm    Wednesday:        12:00pm -- 4:00pm    E/O Thursday:        8:00am - 4:30pm           Urgent Care Locations Clinic Hours Telephone Numbers     [facility] [place]  [address] [place] [address]     Monday-Friday:     11:00am - 9:00pm    Saturday-Sunday:  9:00am - 5:00pm   [phone]     [facility]  [address]   [place] [address]     Monday-Friday:      5:00pm - 9:00pm     Saturday-Sunday:  9:00am - 5:00pm   [phone]               Follow-ups after your visit        Your next 10 appointments already scheduled     Jan 25, 2018  2:40 PM CST   Return Visit with Juani Guerrero MD   [facility])    [address]  "[place] [address] [phone]              Who to contact     If you have questions or need follow up information about today's clinic visit or your schedule please contact [facility] directly at [phone].  Normal or non-critical lab and imaging results will be communicated to you by MyChart, letter or phone within 4 business days after the clinic has received the results. If you do not hear from us within 7 days, please contact the clinic through MyChart or phone. If you have a critical or abnormal lab result, we will notify you by phone as soon as possible.  Submit refill requests through [employer] or call your pharmacy and they will forward the refill request to us. Please allow 3 business days for your refill to be completed.          Additional Information About Your Visit        MyChart Information     [employer] lets you send messages to your doctor, view your test results, renew your prescriptions, schedule appointments and more. To sign up, go to [email or web address] . Click on \"Log in\" on the left side of the screen, which will take you to the Welcome page. Then click on \"Sign up Now\" on the right side of the page.     You will be asked to enter the access code listed below, as well as some personal information. Please follow the directions to create your username and password.     Your access code is: [email or web address]  Expires: 2018  2:29 PM     Your access code will  in 90 days. If you need help or a new code, please call your [facility] or [phone].        Care EveryWhere ID     This is your Care EveryWhere ID. This could be used by other organizations to access your [facility] medical records  [id number]        Your Vitals Were     Pulse BMI (Body Mass Index)                78 30.16 kg/m2           Blood Pressure from Last 3 Encounters:   17 142/88   17 (!) 156/91   12/15/17 135/82    Weight from Last 3 Encounters:   17 77.8 kg (171 lb 8.3 " oz)   12/21/17 77.8 kg (171 lb 9.6 oz)   12/15/17 77.1 kg (170 lb)              We Performed the Following     LARYNGOSCOPY FLEX FIBEROPTIC, DIAGNOSTIC          Today's Medication Changes          These changes are accurate as of: 12/21/17  4:48 PM.  If you have any questions, ask your nurse or doctor.               Start taking these medicines.        Dose/Directions    cetirizine 10 MG tablet   Commonly known as:  zyrTEC   Used for:  Chronic cough   Started by:  Juani Guerrero MD        Dose:  10 mg   Take 1 tablet (10 mg) by mouth daily   Quantity:  30 tablet   Refills:  11       mometasone-formoterol 100-5 MCG/ACT oral inhaler   Commonly known as:  DULERA   Used for:  Chronic cough   Started by:  Juani Guerrero MD        Dose:  2 puff   Inhale 2 puffs into the lungs 2 times daily   Quantity:  1 Inhaler   Refills:  1         Stop taking these medicines if you haven't already. Please contact your care team if you have questions.     fluticasone 110 MCG/ACT Inhaler   Commonly known as:  FLOVENT HFA   Stopped by:  Juani Guerrero MD                Where to get your medicines      These medications were sent to [facility] [address] [place], [place] - [address] AT [facility] [address]  [address] [address]     Phone:  [phone]     cetirizine 10 MG tablet    mometasone-formoterol 100-5 MCG/ACT oral inhaler    montelukast 10 MG tablet                Primary Care Provider Office Phone # Fax #    Sweetie Brayden Gao [phone] [address] [address]        Equal Access to Services     [facility] AH: Hadii sravanthi Lea, victorino casper, qaappleta fredaltalisha orantes. So [facility] [phone].    ATENCIÓN: Si habla español, tiene a ross disposición servicios gratuitos de asistencia lingüística. Llame al [phone].    We comply with applicable federal civil rights laws and [place]  laws. We do not discriminate on the basis of race, color, national origin, age, disability, sex, sexual orientation, or gender identity.            Thank you!     Thank you for choosing [facility]  for your care. Our goal is always to provide you with excellent care. Hearing back from our patients is one way we can continue to improve our services. Please take a few minutes to complete the written survey that you may receive in the mail after your visit with us. Thank you!             Your Updated Medication List - Protect others around you: Learn how to safely use, store and throw away your medicines at www.disposemymeds.org.          This list is accurate as of: 12/21/17  4:48 PM.  Always use your most recent med list.                   Brand Name Dispense Instructions for use Diagnosis    acetaminophen 325 MG tablet    TYLENOL    100 tablet    Take 1-2 tablets (325-650 mg) by mouth every 6 hours as needed for mild pain    Primary osteoarthritis of both knees       albuterol 108 (90 BASE) MCG/ACT Inhaler    PROAIR HFA/PROVENTIL HFA/VENTOLIN HFA    1 Inhaler    Inhale 2 puffs into the lungs every 4 hours as needed    Cough       cetirizine 10 MG tablet    zyrTEC    30 tablet    Take 1 tablet (10 mg) by mouth daily    Chronic cough       cholecalciferol 1000 UNIT tablet    vitamin D3    100 tablet    Take 1 tablet (1,000 Units) by mouth daily    Avitaminosis D       diclofenac 50 MG EC tablet    VOLTAREN    90 tablet    TAKE 1 TABLET(50 MG) BY MOUTH THREE TIMES DAILY AS NEEDED FOR MODERATE PAIN    Osteoarthritis of knee, unspecified laterality, unspecified osteoarthritis type       guaiFENesin-codeine 100-10 MG/5ML Soln solution    ROBITUSSIN AC    120 mL    Take 5 mLs by mouth nightly as needed for cough    Cough       metoprolol 25 MG tablet    LOPRESSOR    180 tablet    Take 1 tablet (25 mg) by mouth 2 times daily    Hypertension goal BP (blood pressure) < 140/90       mometasone-formoterol 100-5  MCG/ACT oral inhaler    DULERA    1 Inhaler    Inhale 2 puffs into the lungs 2 times daily    Chronic cough       montelukast 10 MG tablet    SINGULAIR    30 tablet    Take 1 tablet (10 mg) by mouth At Bedtime    Chronic cough       MULTI COMPLETE Caps     100 capsule    Take 1 capsule by mouth daily    Nutritional deficiency       order for DME     1 kit    Use as directed.    Essential hypertension with goal blood pressure less than 140/90       polyethylene glycol powder    MIRALAX    510 g    Take 17 g (1 capful) by mouth daily    Constipation, unspecified constipation type       ranitidine 150 MG tablet    ZANTAC    180 tablet    Take 1 tablet (150 mg) by mouth 2 times daily    Gastroesophageal reflux disease, esophagitis presence not specified       selenium sulfide 2.5 % lotion    SELSUN    118 mL    Apply to affected area and lather with small amounts of water; leave on skin for 10 minutes, then rinse thoroughly; repeat once every day for 7 days for 8 weeks.    Tinea versicolor          To get better and follow your care plan as instructed.

## 2021-03-02 DIAGNOSIS — R42 VERTIGO: ICD-10-CM

## 2021-03-04 RX ORDER — FLUTICASONE PROPIONATE 50 MCG
SPRAY, SUSPENSION (ML) NASAL
Qty: 16 G | Refills: 0 | Status: SHIPPED | OUTPATIENT
Start: 2021-03-04 | End: 2021-04-15

## 2021-04-13 DIAGNOSIS — R42 VERTIGO: ICD-10-CM

## 2021-04-13 DIAGNOSIS — M17.9 OSTEOARTHRITIS OF KNEE, UNSPECIFIED LATERALITY, UNSPECIFIED OSTEOARTHRITIS TYPE: ICD-10-CM

## 2021-04-15 RX ORDER — FLUTICASONE PROPIONATE 50 MCG
SPRAY, SUSPENSION (ML) NASAL
Qty: 16 G | Refills: 0 | Status: SHIPPED | OUTPATIENT
Start: 2021-04-15 | End: 2022-01-17

## 2021-04-15 NOTE — TELEPHONE ENCOUNTER
Routing refill request to provider for review/approval because:  BP, pt age    BP Readings from Last 3 Encounters:   01/22/21 (!) 138/94   01/05/21 138/86   11/10/20 (!) 147/84

## 2021-04-16 RX ORDER — DICLOFENAC SODIUM 75 MG/1
TABLET, DELAYED RELEASE ORAL
Qty: 60 TABLET | Refills: 1 | Status: SHIPPED | OUTPATIENT
Start: 2021-04-16 | End: 2021-08-03

## 2021-05-04 ENCOUNTER — OFFICE VISIT (OUTPATIENT)
Dept: FAMILY MEDICINE | Facility: CLINIC | Age: 66
End: 2021-05-04
Payer: COMMERCIAL

## 2021-05-04 VITALS
OXYGEN SATURATION: 99 % | TEMPERATURE: 97.9 F | WEIGHT: 166.6 LBS | BODY MASS INDEX: 29.51 KG/M2 | HEART RATE: 82 BPM | SYSTOLIC BLOOD PRESSURE: 111 MMHG | DIASTOLIC BLOOD PRESSURE: 72 MMHG

## 2021-05-04 DIAGNOSIS — J45.901 REACTIVE AIRWAY DISEASE WITH ACUTE EXACERBATION, UNSPECIFIED ASTHMA SEVERITY, UNSPECIFIED WHETHER PERSISTENT: ICD-10-CM

## 2021-05-04 DIAGNOSIS — E87.6 HYPOKALEMIA: ICD-10-CM

## 2021-05-04 DIAGNOSIS — I10 ESSENTIAL HYPERTENSION WITH GOAL BLOOD PRESSURE LESS THAN 140/90: ICD-10-CM

## 2021-05-04 DIAGNOSIS — E78.5 HYPERLIPIDEMIA LDL GOAL <160: Primary | ICD-10-CM

## 2021-05-04 LAB
CHOLEST SERPL-MCNC: 256 MG/DL
HDLC SERPL-MCNC: 41 MG/DL
LDLC SERPL CALC-MCNC: 182 MG/DL
NONHDLC SERPL-MCNC: 215 MG/DL
POTASSIUM SERPL-SCNC: 3.7 MMOL/L (ref 3.4–5.3)
TRIGL SERPL-MCNC: 166 MG/DL

## 2021-05-04 PROCEDURE — 36415 COLL VENOUS BLD VENIPUNCTURE: CPT | Performed by: PHYSICIAN ASSISTANT

## 2021-05-04 PROCEDURE — 80061 LIPID PANEL: CPT | Performed by: PHYSICIAN ASSISTANT

## 2021-05-04 PROCEDURE — 84132 ASSAY OF SERUM POTASSIUM: CPT | Performed by: PHYSICIAN ASSISTANT

## 2021-05-04 PROCEDURE — 99214 OFFICE O/P EST MOD 30 MIN: CPT | Performed by: PHYSICIAN ASSISTANT

## 2021-05-04 RX ORDER — ALBUTEROL SULFATE 90 UG/1
2 AEROSOL, METERED RESPIRATORY (INHALATION) EVERY 6 HOURS PRN
Qty: 18 G | Refills: 0 | Status: SHIPPED | OUTPATIENT
Start: 2021-05-04 | End: 2021-06-01

## 2021-05-04 NOTE — PATIENT INSTRUCTIONS
Work on walking 30 minutes 3 days per week.         [facility]   [address]   [facility] [address]   [place] [address]   Phone: [phone]

## 2021-05-04 NOTE — PROGRESS NOTES
"    Assessment & Plan     Hyperlipidemia LDL goal <160  Patient wanted to recheck before starting medications.   - Lipid panel reflex to direct LDL Fasting    Hypokalemia  Recheck. Taking potassium.   - **Potassium FUTURE anytime    Reactive airway disease with acute exacerbation, unspecified asthma severity, unspecified whether persistent  Can try albuterol prior to exercise however some is likely exercise endurance monitor.   - Spacer/Aero-Holding Chambers (E-Z SPACER) CAMRON; Use with inhalers.    Essential hypertension with goal blood pressure less than 140/90  Stable and well controlled.                BMI:   Estimated body mass index is 29.51 kg/m  as calculated from the following:    Height as of 1/5/21: 1.6 m (5' 3\").    Weight as of this encounter: 75.6 kg (166 lb 9.6 oz).   Weight management plan: Discussed healthy diet and exercise guidelines        No follow-ups on file.    PRATIMA Galicia [facility] MADHURI Wei is a 66 year old who presents for the following health issues     HPI     Hypertension Follow-up      Do you check your blood pressure regularly outside of the clinic? No     Are you following a low salt diet? Yes    Are your blood pressures ever more than 140 on the top number (systolic) OR more   than 90 on the bottom number (diastolic), for example 140/90?       How many servings of fruits and vegetables do you eat daily?  2-3    On average, how many sweetened beverages do you drink each day (Examples: soda, juice, sweet tea, etc.  Do NOT count diet or artificially sweetened beverages)?   0    How many days per week do you exercise enough to make your heart beat faster? 3 or less    How many minutes a day do you exercise enough to make your heart beat faster? 9 or less    How many days per week do you miss taking your medication? 0    When she is walking and exercising for awhile. She notes that she feels tired. Not short of breath. No chest pain. No " swelling in the legs   She always feels weak.   Only exercising once per month.       The 10-year ASCVD risk score (Maria LYLE Jr., et al., 2013) is: 7.6%    Values used to calculate the score:      Age: 66 years      Sex: Female      Is Non- : No      Diabetic: No      Tobacco smoker: No      Systolic Blood Pressure: 111 mmHg      Is BP treated: Yes      HDL Cholesterol: 41 mg/dL      Total Cholesterol: 240 mg/dL        Review of Systems   Constitutional, HEENT, cardiovascular, pulmonary, gi and gu systems are negative, except as otherwise noted.      Objective    /72 (BP Location: Left arm, Patient Position: Chair, Cuff Size: Adult Large)   Pulse 82   Temp 97.9  F (36.6  C) (Oral)   Wt 75.6 kg (166 lb 9.6 oz)   SpO2 99%   Breastfeeding No   BMI 29.51 kg/m    Body mass index is 29.51 kg/m .  Physical Exam   GENERAL: healthy, alert and no distress  RESP: lungs clear to auscultation - no rales, rhonchi or wheezes  CV: regular rate and rhythm, normal S1 S2, no S3 or S4, no murmur, click or rub, no peripheral edema

## 2021-05-05 ENCOUNTER — TELEPHONE (OUTPATIENT)
Dept: FAMILY MEDICINE | Facility: CLINIC | Age: 66
End: 2021-05-05

## 2021-05-05 DIAGNOSIS — E87.6 HYPOKALEMIA: ICD-10-CM

## 2021-05-05 DIAGNOSIS — E78.5 HYPERLIPIDEMIA LDL GOAL <160: Primary | ICD-10-CM

## 2021-05-05 NOTE — TELEPHONE ENCOUNTER
Attempt #1 to call patient.     Patient did not answer, RN left voicemail at home number. RN requested patient return call to [facility] [phone].     Ayah Dexter RN, BSN, PHN  [facility] [place]

## 2021-05-05 NOTE — TELEPHONE ENCOUNTER
Please call patient.   Her cholesterol has increased from last check. I would now strongly recommend cholesterol medication. I have prescribed atorvastatin 20mg for her to take once per day if she is willing.   Her potassium is normal so she should continue her potassium supplements.     RN can send both prescriptions to her preferred pharmacy if patient is agreeable.   Drea Denise PA-C

## 2021-05-10 RX ORDER — POTASSIUM CHLORIDE 750 MG/1
TABLET, EXTENDED RELEASE ORAL
Qty: 90 TABLET | Refills: 1 | Status: SHIPPED | OUTPATIENT
Start: 2021-05-10 | End: 2021-12-21

## 2021-05-10 RX ORDER — ATORVASTATIN CALCIUM 20 MG/1
20 TABLET, FILM COATED ORAL DAILY
Qty: 90 TABLET | Refills: 1 | Status: SHIPPED | OUTPATIENT
Start: 2021-05-10 | End: 2021-11-05

## 2021-05-10 NOTE — TELEPHONE ENCOUNTER
Spoke with patient. Patient notified of provider message as written. Patient verbalized understanding and agrees with plan. Prescriptions sent to preferred pharmacy.    Priscilla Andujar RN

## 2021-05-31 DIAGNOSIS — J45.901 REACTIVE AIRWAY DISEASE WITH ACUTE EXACERBATION: Primary | ICD-10-CM

## 2021-06-01 RX ORDER — ALBUTEROL SULFATE 90 UG/1
AEROSOL, METERED RESPIRATORY (INHALATION)
Qty: 18 G | Refills: 0 | Status: SHIPPED | OUTPATIENT
Start: 2021-06-01 | End: 2022-01-24

## 2021-07-05 DIAGNOSIS — M25.511 ACUTE PAIN OF RIGHT SHOULDER: ICD-10-CM

## 2021-07-05 RX ORDER — PSEUDOEPHED/ACETAMINOPH/DIPHEN 30MG-500MG
TABLET ORAL
Qty: 100 TABLET | Refills: 1 | Status: SHIPPED | OUTPATIENT
Start: 2021-07-05 | End: 2022-01-27

## 2021-07-05 NOTE — TELEPHONE ENCOUNTER
"Prescription approved per [facility] Refill Protocol.    Requested Prescriptions   Pending Prescriptions Disp Refills     ACETAMINOPHEN EXTRA STRENGTH 500 MG tablet [Pharmacy Med Name: ACETAMINOPHEN 500MG E/S CAPLETS] 100 tablet 1     Sig: TAKE 2 TABLETS(1000 MG) BY MOUTH EVERY 8 HOURS AS NEEDED FOR MILD PAIN       Analgesics (Non-Narcotic Tylenol and ASA Only) Passed - 7/5/2021  5:29 PM        Passed - Recent (12 mo) or future (30 days) visit within the authorizing provider's specialty     Patient has had an office visit with the authorizing provider or a provider within the authorizing providers department within the previous 12 mos or has a future within next 30 days. See \"Patient Info\" tab in inbasket, or \"Choose Columns\" in Meds & Orders section of the refill encounter.              Passed - Patient is 7 months old or older     If patient is a peds patient of the age 7 mos -12 years, ok to refill using weight-based dosing.     If >3g daily and/or sig is not \"prn\", check for liver enzymes. If normal in the last year, ok to refill.  If not, refer to the provider.          Passed - Medication is active on med list           Evelyn PERALTA RN, BSN  [facility]    "

## 2021-07-28 ENCOUNTER — PATIENT OUTREACH (OUTPATIENT)
Dept: GERIATRIC MEDICINE | Facility: CLINIC | Age: 66
End: 2021-07-28

## 2021-07-28 NOTE — PROGRESS NOTES
[facility] Care Coordination Contact      [facility] Six-Month Telephone Assessment    6 month telephone assessment completed on 7/28/21.    ER visits: No  Hospitalizations: No  TCU stays: No  Significant health status changes: No  Falls/Injuries: No  ADL/IADL changes: No  Changes in services: No    Caregiver Assessment follow up:  N/A    Goals: See POC in chart for goal progress documentation.      Will see member in 6 months for an annual health risk assessment.   Encouraged member to call CC with any questions or concerns in the meantime.     Ruth Long RN BSN PHN  [facility] Care Coordinator  [phone]  Fax:[phone]

## 2021-08-02 DIAGNOSIS — M17.9 OSTEOARTHRITIS OF KNEE, UNSPECIFIED LATERALITY, UNSPECIFIED OSTEOARTHRITIS TYPE: ICD-10-CM

## 2021-08-03 RX ORDER — DICLOFENAC SODIUM 75 MG/1
TABLET, DELAYED RELEASE ORAL
Qty: 60 TABLET | Refills: 1 | Status: SHIPPED | OUTPATIENT
Start: 2021-08-03 | End: 2022-02-21

## 2021-08-03 NOTE — TELEPHONE ENCOUNTER
Routing refill request to provider for review/approval because:  Age          Pending Prescriptions:                       Disp   Refills    diclofenac (VOLTAREN) 75 MG EC tablet [Pha*60 tab*1        Sig: TAKE 1 TABLET(75 MG) BY MOUTH TWICE DAILY AS NEEDED           FOR MODERATE PAIN        Kit KRISTIE Hanson

## 2021-08-18 ENCOUNTER — TELEPHONE (OUTPATIENT)
Dept: FAMILY MEDICINE | Facility: CLINIC | Age: 66
End: 2021-08-18

## 2021-08-18 DIAGNOSIS — I10 ESSENTIAL HYPERTENSION WITH GOAL BLOOD PRESSURE LESS THAN 140/90: ICD-10-CM

## 2021-08-18 NOTE — LETTER
August 18, 2021    Sanjuana Salamanca  [address]  [place] [address]    Dear Sanjuana    We care about your health and have reviewed your health plan. We have reviewed your medical conditions, medication list, and lab results and are making recommendations based on this review, to better manage your health.    You are in particular need of attention regarding:  - Your Asthma  - Scheduling a Breast Cancer Screening (Mammography) [phone]  - Scheduling a Wellness (Physical/Lab) Visit age 65 and older [phone]      Here is a list of Health Maintenance topics that are due now or due soon:  Health Maintenance Due   Topic Date Due     DEXA  Never done     ANNUAL REVIEW OF HM ORDERS  Never done     Pneumococcal Vaccine: 65+ Years (1 of 2 - PPSV23) Never done     COVID-19 Vaccine (1) Never done     ZOSTER IMMUNIZATION (1 of 2) Never done     MAMMO SCREENING  04/21/2018     ADVANCE CARE PLANNING  05/23/2019     MEDICARE ANNUAL WELLNESS VISIT  01/01/2020     FALL RISK ASSESSMENT  Never done     ASTHMA ACTION PLAN  12/02/2020     PHQ-2  01/01/2021     ASTHMA CONTROL TEST  07/22/2021       Please call us at [phone] (or use [employer]) to address the above recommendations. If we do not hear from you in the next couple of weeks we will be reaching out to you again.    Thank you for trusting [facility] and we appreciate the opportunity to serve you.  We look forward to supporting your healthcare needs in the future.    Healthy Regards,    [employer]

## 2021-08-18 NOTE — TELEPHONE ENCOUNTER
Patient Quality Outreach      Summary:    Patient has the following on her problem list/HM:     Hypertension   Last three blood pressure readings:  BP Readings from Last 3 Encounters:   05/04/21 111/72   01/22/21 (!) 138/94   01/05/21 138/86     Blood pressure: Passed    HTN Guidelines:  ? 139/89     Patient is due/failing the following:   Asthma follow-up visit, Breast Cancer Screening - Mammogram and Annual wellness, date due: 1/1/2020    Type of outreach:    Sent letter.    Questions for provider review:    None                                                                                                                                     Regina YIN MA       Chart routed to Care Team.

## 2021-08-18 NOTE — LETTER
August 31, 2021    Sanjuana Salamanca  [address]  [address]  [place] [address]    Dear Sanjuana    We care about your health and have reviewed your health plan. We have reviewed your medical conditions, medication list, and lab results and are making recommendations based on this review, to better manage your health.    You are in particular need of attention regarding:  - Your Asthma  - Scheduling a Breast Cancer Screening (Mammography) [phone]  - Scheduling a Wellness (Physical/Lab) Visit age 65 and older [phone]      Here is a list of Health Maintenance topics that are due now or due soon:  Health Maintenance Due   Topic Date Due     DEXA  Never done     ANNUAL REVIEW OF HM ORDERS  Never done     Pneumococcal Vaccine: 65+ Years (1 of 2 - PPSV23) Never done     COVID-19 Vaccine (1) Never done     ZOSTER IMMUNIZATION (1 of 2) Never done     MAMMO SCREENING  04/21/2018     ADVANCE CARE PLANNING  05/23/2019     MEDICARE ANNUAL WELLNESS VISIT  01/01/2020     FALL RISK ASSESSMENT  Never done     ASTHMA ACTION PLAN  12/02/2020     PHQ-2  01/01/2021     ASTHMA CONTROL TEST  07/22/2021     INFLUENZA VACCINE (1) 09/01/2021       Please call us at [phone] (or use [employer]) to address the above recommendations. If we do not hear from you in the next couple of weeks we will be reaching out to you again.    Thank you for trusting [facility] and we appreciate the opportunity to serve you.  We look forward to supporting your healthcare needs in the future.    Healthy Regards,    Team [employer]

## 2021-08-20 RX ORDER — AMLODIPINE BESYLATE 5 MG/1
TABLET ORAL
Qty: 90 TABLET | Refills: 1 | Status: SHIPPED | OUTPATIENT
Start: 2021-08-20 | End: 2021-12-21

## 2021-08-31 NOTE — TELEPHONE ENCOUNTER
Patient Quality Outreach 2nd Attempt      Summary:    Type of outreach:    Phone, left message for patient/parent to call back. with      Next Steps:  Reach out within 90 days via Letter.    Max number of attempts reached: Yes. Will try again in 90 days if patient still on fail list.    Questions for provider review:    None                                                                                                                    Regina YIN MA     Chart routed to Care Team.

## 2021-11-03 DIAGNOSIS — K21.9 GASTROESOPHAGEAL REFLUX DISEASE, UNSPECIFIED WHETHER ESOPHAGITIS PRESENT: Primary | ICD-10-CM

## 2021-11-03 DIAGNOSIS — E78.5 HYPERLIPIDEMIA LDL GOAL <160: ICD-10-CM

## 2021-11-05 RX ORDER — ATORVASTATIN CALCIUM 20 MG/1
TABLET, FILM COATED ORAL
Qty: 90 TABLET | Refills: 0 | Status: SHIPPED | OUTPATIENT
Start: 2021-11-05 | End: 2021-12-21

## 2021-12-21 ENCOUNTER — OFFICE VISIT (OUTPATIENT)
Dept: FAMILY MEDICINE | Facility: CLINIC | Age: 66
End: 2021-12-21
Payer: COMMERCIAL

## 2021-12-21 VITALS
BODY MASS INDEX: 30.29 KG/M2 | HEART RATE: 93 BPM | OXYGEN SATURATION: 97 % | TEMPERATURE: 98.2 F | DIASTOLIC BLOOD PRESSURE: 84 MMHG | WEIGHT: 171 LBS | SYSTOLIC BLOOD PRESSURE: 148 MMHG

## 2021-12-21 DIAGNOSIS — Z78.0 ASYMPTOMATIC MENOPAUSAL STATE: ICD-10-CM

## 2021-12-21 DIAGNOSIS — K59.00 CONSTIPATION, UNSPECIFIED CONSTIPATION TYPE: ICD-10-CM

## 2021-12-21 DIAGNOSIS — L81.1 MELASMA: ICD-10-CM

## 2021-12-21 DIAGNOSIS — I10 ESSENTIAL HYPERTENSION WITH GOAL BLOOD PRESSURE LESS THAN 140/90: Primary | ICD-10-CM

## 2021-12-21 DIAGNOSIS — Z12.31 ENCOUNTER FOR SCREENING MAMMOGRAM FOR MALIGNANT NEOPLASM OF BREAST: ICD-10-CM

## 2021-12-21 DIAGNOSIS — L91.8 SKIN TAG: ICD-10-CM

## 2021-12-21 DIAGNOSIS — E55.9 AVITAMINOSIS D: ICD-10-CM

## 2021-12-21 DIAGNOSIS — E78.5 HYPERLIPIDEMIA LDL GOAL <160: ICD-10-CM

## 2021-12-21 DIAGNOSIS — K21.9 GASTROESOPHAGEAL REFLUX DISEASE, UNSPECIFIED WHETHER ESOPHAGITIS PRESENT: ICD-10-CM

## 2021-12-21 DIAGNOSIS — E87.6 HYPOKALEMIA: ICD-10-CM

## 2021-12-21 PROCEDURE — 99214 OFFICE O/P EST MOD 30 MIN: CPT | Performed by: PHYSICIAN ASSISTANT

## 2021-12-21 RX ORDER — POLYETHYLENE GLYCOL 3350 17 G/17G
POWDER, FOR SOLUTION ORAL
Qty: 510 G | Refills: 11 | Status: SHIPPED | OUTPATIENT
Start: 2021-12-21

## 2021-12-21 RX ORDER — ATORVASTATIN CALCIUM 20 MG/1
20 TABLET, FILM COATED ORAL DAILY
Qty: 90 TABLET | Refills: 3 | Status: SHIPPED | OUTPATIENT
Start: 2021-12-21

## 2021-12-21 RX ORDER — POTASSIUM CHLORIDE 750 MG/1
TABLET, EXTENDED RELEASE ORAL
Qty: 90 TABLET | Refills: 1 | Status: SHIPPED | OUTPATIENT
Start: 2021-12-21 | End: 2022-10-20

## 2021-12-21 RX ORDER — TRETINOIN 0.25 MG/G
GEL TOPICAL AT BEDTIME
Qty: 20 G | Refills: 0 | Status: SHIPPED | OUTPATIENT
Start: 2021-12-21 | End: 2022-01-24

## 2021-12-21 RX ORDER — AMLODIPINE BESYLATE 10 MG/1
10 TABLET ORAL DAILY
Qty: 90 TABLET | Refills: 1 | Status: SHIPPED | OUTPATIENT
Start: 2021-12-21 | End: 2022-07-25

## 2021-12-21 ASSESSMENT — ENCOUNTER SYMPTOMS
EYE PAIN: 0
DYSURIA: 0
EYE ITCHING: 0
NUMBNESS: 0
FATIGUE: 1
VOMITING: 0
NAUSEA: 0
SHORTNESS OF BREATH: 0
LIGHT-HEADEDNESS: 0
DIZZINESS: 1
WEAKNESS: 1
ABDOMINAL PAIN: 0
SORE THROAT: 0
CONSTIPATION: 1
HEADACHES: 0
SINUS PAIN: 0
DIARRHEA: 0
CHEST TIGHTNESS: 0

## 2021-12-21 ASSESSMENT — ASTHMA QUESTIONNAIRES
QUESTION_1 LAST FOUR WEEKS HOW MUCH OF THE TIME DID YOUR ASTHMA KEEP YOU FROM GETTING AS MUCH DONE AT WORK, SCHOOL OR AT HOME: NONE OF THE TIME
ACT_TOTALSCORE: 25
QUESTION_5 LAST FOUR WEEKS HOW WOULD YOU RATE YOUR ASTHMA CONTROL: COMPLETELY CONTROLLED
QUESTION_2 LAST FOUR WEEKS HOW OFTEN HAVE YOU HAD SHORTNESS OF BREATH: NOT AT ALL
QUESTION_3 LAST FOUR WEEKS HOW OFTEN DID YOUR ASTHMA SYMPTOMS (WHEEZING, COUGHING, SHORTNESS OF BREATH, CHEST TIGHTNESS OR PAIN) WAKE YOU UP AT NIGHT OR EARLIER THAN USUAL IN THE MORNING: NOT AT ALL
QUESTION_4 LAST FOUR WEEKS HOW OFTEN HAVE YOU USED YOUR RESCUE INHALER OR NEBULIZER MEDICATION (SUCH AS ALBUTEROL): NOT AT ALL

## 2021-12-21 NOTE — PROGRESS NOTES
Assessment & Plan     (I10) Essential hypertension with goal blood pressure less than 140/90  (primary encounter diagnosis)  Comment: Patient's BP was elevated at 148/84. Patient is experiencing not adverse effects of BP medication. Amlodipine dosage increased from 5 mg to 10 mg daily. Follow-up appointment scheduled in 2 weeks for BP recheck.  Plan: REVIEW OF HEALTH MAINTENANCE PROTOCOL ORDERS,         amLODIPine (NORVASC) 10 MG tablet        Follow-up appointment 2 weeks for BP check and removal of skin tags on neck    (E78.5) Hyperlipidemia LDL goal <160  Comment: Patient tolerating medication well and requested refill of Atorvastatin 20 mg tablet.  Plan: atorvastatin (LIPITOR) 20 MG tablet            (E87.6) Hypokalemia  Comment: Patient tolerating medication well and requested refill of Potassium chloride ER 10 MEQ CR tablet.  Plan: potassium chloride ER (KLOR-CON M) 10 MEQ CR         tablet            (K21.9) Gastroesophageal reflux disease, unspecified whether esophagitis present  Comment: Patient tolerating medication well and requested a refill of Omeprazole 20 MG DR capsule to control to control her GERD symptoms.  Plan: omeprazole (PRILOSEC) 20 MG DR capsule            (E55.9) Avitaminosis D  Comment: Patient tolerating medication well and requested a refill of Cholecalciferol 25 MCG Tabs  Plan: cholecalciferol 25 MCG (1000 UT) TABS         (K59.00) Constipation, unspecified constipation type  Comment: Patient believes that Polyethylene glycol works well to control her constipation. Medication was refilled today per patient request.  Plan: polyethylene glycol (MIRALAX) 17 GM/Dose powder            (L81.1) Melasma  Comment: Patient has hyperpigmented macules on her face indicating melasma. Tretinoin was prescribed for patient as she has found this to be helpful in the past. Patient understands may not be covered by insurance.   Plan: tretinoin (RETIN-A) 0.025 % external gel            (Z12.31) Encounter  "for screening mammogram for malignant neoplasm of breast  Comment: Patient plans to schedule mammogram in the future after returning from [facility].  Plan: MA SCREENING DIGITAL BILAT - Future  (s+30)          (Z78.0) Asymptomatic menopausal state  Comment: Patient plans to schedule DEXA scan after returning from [facility].  Plan: DEXA HIP/PELVIS/SPINE - Future        Skin Tag: patient will follow up for cryotherapy and removal.                  BMI:   Estimated body mass index is 30.29 kg/m  as calculated from the following:    Height as of 1/5/21: 1.6 m (5' 3\").    Weight as of this encounter: 77.6 kg (171 lb).           Return in about 2 weeks (around 1/4/2022) for BP Recheck.    SLADE Cowan PA-C  [facility]  The student Sudha Otero PAS2 acted as a scribe and the encounter documented above was completely performed by myself and the documentation reflects the work I have performed today.   Drea Denise PA-C       Tomás Wei is a 66 year old who presents for the following health issues     HPI     Hypertension Follow-up      Do you check your blood pressure regularly outside of the clinic? No     Are you following a low salt diet? Yes    Are your blood pressures ever more than 140 on the top number (systolic) OR more   than 90 on the bottom number (diastolic), for example 140/90? Pt doesn't check bp      How many servings of fruits and vegetables do you eat daily?  2-3    On average, how many sweetened beverages do you drink each day (Examples: soda, juice, sweet tea, etc.  Do NOT count diet or artificially sweetened beverages)?   0    How many days per week do you exercise enough to make your heart beat faster? 3 or less    How many minutes a day do you exercise enough to make your heart beat faster? 9 or less    How many days per week do you miss taking your medication? 0    Rash  Onset/Duration: 2-3 weeks  Description  Location: all over itchiness   Character: " no marks  Itching: moderate  Intensity:  moderate  Progression of Symptoms:  improving  Accompanying signs and symptoms:   Fever: no  Body aches or joint pain: no  Sore throat symptoms: no  Recent cold symptoms: no  History:           Previous episodes of similar rash: None  New exposures:  None  Recent travel: no  Exposure to similar rash: no  Precipitating or alleviating factors:   Therapies tried and outcome: baby lotion and cortisone ointment helps     Will be going back to [facility] for 3 months. Needs some refills. Will consider mammo etc when she returns.     Used her daughters retinoid for hyperpigmentation on her face, would like her own.     Review of Systems   Constitutional: Positive for fatigue.   HENT: Negative for congestion, ear pain, sinus pain and sore throat.    Eyes: Negative for pain and itching.   Respiratory: Negative for chest tightness and shortness of breath.    Cardiovascular: Negative for chest pain.   Gastrointestinal: Positive for constipation. Negative for abdominal pain, diarrhea, nausea and vomiting.        Medication helps with constipation   Genitourinary: Negative for decreased urine volume and dysuria.   Neurological: Positive for dizziness and weakness. Negative for syncope, light-headedness, numbness and headaches.        Dizziness when laying down for bed,laying on side helps            Objective    BP (!) 148/84   Pulse 93   Temp 98.2  F (36.8  C) (Oral)   Wt 77.6 kg (171 lb)   SpO2 97%   Breastfeeding No   BMI 30.29 kg/m    Body mass index is 30.29 kg/m .  Physical Exam  Constitutional:       Appearance: Normal appearance. She is obese.   Cardiovascular:      Rate and Rhythm: Normal rate and regular rhythm.      Heart sounds: Normal heart sounds.   Pulmonary:      Effort: Pulmonary effort is normal.      Breath sounds: Normal breath sounds.   Skin:     General: Skin is warm.      Findings: No rash.          Neurological:      Mental Status: She is alert.     Peripheral:  No peripheral edema

## 2021-12-21 NOTE — PATIENT INSTRUCTIONS
1. Increase your amlodipine to 10mg. You can take 2 of the 5mg tablets until you get your new prescription.   2. Follow up in 2 weeks for blood pressure recheck and skin tag removal.   3. Use daily over the counter sunscreen when using the retinoid cream to prevent sunburn.       We care about your health and have noticed that you are due for a mammogram. Please schedule this at your earliest convenience at the location of your choice.     Call [phone] and request a Mammogram appointment.      [place] [facility] are performed Monday- Friday at our [facility], [facility], or our [facility].

## 2021-12-22 ASSESSMENT — ASTHMA QUESTIONNAIRES: ACT_TOTALSCORE: 25

## 2022-01-03 ENCOUNTER — PATIENT OUTREACH (OUTPATIENT)
Dept: GERIATRIC MEDICINE | Facility: CLINIC | Age: 67
End: 2022-01-03
Payer: COMMERCIAL

## 2022-01-03 NOTE — PROGRESS NOTES
[facility] Care Coordination Contact      [facility] Refusal Telephone Assessment    Member refused home visit HR on 01/03/22: Not interested in assessment at this time.    ER visits: No  Hospitalizations: No  Health concerns: No  Falls/Injuries: No  ADL/IADL Dependencies: Independent        Member currently receiving the following home care services:   N/A  Member currently receiving the following community resources: Care coordination   Informal support(s):  Family    Advanced Care Planning discussion, complete code section.    [facility] sponsored benefits: Shared information re: Silver Sneakers/gym memberships, ASA, Calcium +D.    Follow-Up Plan: Member informed of future contact, plan to f/u with member with a 6 month telephone assessment and offer a home visit.  Contact information shared with member and family, encouraged member to call with any questions or concerns at any time.    Ruth Long RN BSN PHN  [facility] Care Coordinator  [phone]  Fax:[phone]

## 2022-01-07 ENCOUNTER — OFFICE VISIT (OUTPATIENT)
Dept: FAMILY MEDICINE | Facility: CLINIC | Age: 67
End: 2022-01-07
Payer: COMMERCIAL

## 2022-01-07 VITALS
DIASTOLIC BLOOD PRESSURE: 88 MMHG | BODY MASS INDEX: 30.11 KG/M2 | TEMPERATURE: 97.7 F | WEIGHT: 170 LBS | HEART RATE: 92 BPM | SYSTOLIC BLOOD PRESSURE: 138 MMHG

## 2022-01-07 DIAGNOSIS — R05.9 COUGH: ICD-10-CM

## 2022-01-07 DIAGNOSIS — L29.9 ITCHING: ICD-10-CM

## 2022-01-07 DIAGNOSIS — L91.8 SKIN TAG: Primary | ICD-10-CM

## 2022-01-07 DIAGNOSIS — Z13.220 SCREENING CHOLESTEROL LEVEL: ICD-10-CM

## 2022-01-07 DIAGNOSIS — M62.81 GENERALIZED MUSCLE WEAKNESS: ICD-10-CM

## 2022-01-07 DIAGNOSIS — E87.6 HYPOKALEMIA: ICD-10-CM

## 2022-01-07 DIAGNOSIS — E55.9 AVITAMINOSIS D: ICD-10-CM

## 2022-01-07 LAB
ALBUMIN SERPL-MCNC: 4.1 G/DL (ref 3.4–5)
ALP SERPL-CCNC: 82 U/L (ref 40–150)
ALT SERPL W P-5'-P-CCNC: 29 U/L (ref 0–50)
ANION GAP SERPL CALCULATED.3IONS-SCNC: 6 MMOL/L (ref 3–14)
AST SERPL W P-5'-P-CCNC: 18 U/L (ref 0–45)
BASOPHILS # BLD AUTO: 0 10E3/UL (ref 0–0.2)
BASOPHILS NFR BLD AUTO: 0 %
BILIRUB SERPL-MCNC: 0.5 MG/DL (ref 0.2–1.3)
BUN SERPL-MCNC: 13 MG/DL (ref 7–30)
CALCIUM SERPL-MCNC: 10 MG/DL (ref 8.5–10.1)
CHLORIDE BLD-SCNC: 104 MMOL/L (ref 94–109)
CHOLEST SERPL-MCNC: 219 MG/DL
CO2 SERPL-SCNC: 29 MMOL/L (ref 20–32)
CREAT SERPL-MCNC: 0.51 MG/DL (ref 0.52–1.04)
EOSINOPHIL # BLD AUTO: 0.2 10E3/UL (ref 0–0.7)
EOSINOPHIL NFR BLD AUTO: 2 %
ERYTHROCYTE [DISTWIDTH] IN BLOOD BY AUTOMATED COUNT: 15.7 % (ref 10–15)
FASTING STATUS PATIENT QL REPORTED: NO
GFR SERPL CREATININE-BSD FRML MDRD: >90 ML/MIN/1.73M2
GLUCOSE BLD-MCNC: 95 MG/DL (ref 70–99)
HBA1C MFR BLD: 6.2 % (ref 0–5.6)
HCT VFR BLD AUTO: 41.8 % (ref 35–47)
HDLC SERPL-MCNC: 44 MG/DL
HGB BLD-MCNC: 13.4 G/DL (ref 11.7–15.7)
LDLC SERPL CALC-MCNC: 142 MG/DL
LYMPHOCYTES # BLD AUTO: 2.8 10E3/UL (ref 0.8–5.3)
LYMPHOCYTES NFR BLD AUTO: 26 %
MCH RBC QN AUTO: 27.3 PG (ref 26.5–33)
MCHC RBC AUTO-ENTMCNC: 32.1 G/DL (ref 31.5–36.5)
MCV RBC AUTO: 85 FL (ref 78–100)
MONOCYTES # BLD AUTO: 1 10E3/UL (ref 0–1.3)
MONOCYTES NFR BLD AUTO: 10 %
NEUTROPHILS # BLD AUTO: 6.9 10E3/UL (ref 1.6–8.3)
NEUTROPHILS NFR BLD AUTO: 63 %
NONHDLC SERPL-MCNC: 175 MG/DL
PLATELET # BLD AUTO: 396 10E3/UL (ref 150–450)
POTASSIUM BLD-SCNC: 3.6 MMOL/L (ref 3.4–5.3)
PROT SERPL-MCNC: 8.3 G/DL (ref 6.8–8.8)
RBC # BLD AUTO: 4.9 10E6/UL (ref 3.8–5.2)
SODIUM SERPL-SCNC: 139 MMOL/L (ref 133–144)
TRIGL SERPL-MCNC: 166 MG/DL
TSH SERPL DL<=0.005 MIU/L-ACNC: 2.67 MU/L (ref 0.4–4)
VIT B12 SERPL-MCNC: 581 PG/ML (ref 193–986)
WBC # BLD AUTO: 11 10E3/UL (ref 4–11)

## 2022-01-07 PROCEDURE — 11200 RMVL SKIN TAGS UP TO&INC 15: CPT | Performed by: PHYSICIAN ASSISTANT

## 2022-01-07 PROCEDURE — 82607 VITAMIN B-12: CPT | Performed by: PHYSICIAN ASSISTANT

## 2022-01-07 PROCEDURE — 83036 HEMOGLOBIN GLYCOSYLATED A1C: CPT | Performed by: PHYSICIAN ASSISTANT

## 2022-01-07 PROCEDURE — 99214 OFFICE O/P EST MOD 30 MIN: CPT | Mod: 25 | Performed by: PHYSICIAN ASSISTANT

## 2022-01-07 PROCEDURE — 80061 LIPID PANEL: CPT | Performed by: PHYSICIAN ASSISTANT

## 2022-01-07 PROCEDURE — U0003 INFECTIOUS AGENT DETECTION BY NUCLEIC ACID (DNA OR RNA); SEVERE ACUTE RESPIRATORY SYNDROME CORONAVIRUS 2 (SARS-COV-2) (CORONAVIRUS DISEASE [COVID-19]), AMPLIFIED PROBE TECHNIQUE, MAKING USE OF HIGH THROUGHPUT TECHNOLOGIES AS DESCRIBED BY CMS-2020-01-R: HCPCS | Performed by: PHYSICIAN ASSISTANT

## 2022-01-07 PROCEDURE — 36415 COLL VENOUS BLD VENIPUNCTURE: CPT | Performed by: PHYSICIAN ASSISTANT

## 2022-01-07 PROCEDURE — 82306 VITAMIN D 25 HYDROXY: CPT | Performed by: PHYSICIAN ASSISTANT

## 2022-01-07 PROCEDURE — 80050 GENERAL HEALTH PANEL: CPT | Performed by: PHYSICIAN ASSISTANT

## 2022-01-07 PROCEDURE — 11201 RMVL SKIN TAGS EA ADDL 10: CPT | Performed by: PHYSICIAN ASSISTANT

## 2022-01-07 PROCEDURE — U0005 INFEC AGEN DETEC AMPLI PROBE: HCPCS | Performed by: PHYSICIAN ASSISTANT

## 2022-01-07 RX ORDER — CETIRIZINE HYDROCHLORIDE 10 MG/1
10 TABLET ORAL DAILY
Qty: 90 TABLET | Refills: 3 | Status: SHIPPED | OUTPATIENT
Start: 2022-01-07

## 2022-01-07 NOTE — LETTER
January 11, 2022    Sanjuana Salamanca  [address]    [place] [address]          Dear ,    We are writing to inform you of your test results.    Your labs show an improvement in your cholesterol. Your sugar level is elevated in the pre-diabetic range.   You should work on increased exercise and weight loss to prevent th development of diabetes.   Your COVID test was negative.       Resulted Orders   Symptomatic; Unknown COVID-19 Virus (Coronavirus) by PCR Nose   Result Value Ref Range    SARS CoV2 PCR Negative Negative, Testing sent to reference lab. Results will be returned via unsolicited result      Comment:      NEGATIVE: SARS-CoV-2 (COVID-19) RNA not detected, presumed negative.    Narrative    Testing was performed using the [employer] Xpress SARS-CoV-2 Assay on the  Cepheid Gene-Xpert Instrument Systems. Additional information about  this Emergency Use Authorization (EUA) assay can be found via the Lab  Guide. This test should be ordered for the detection of SARS-CoV-2 in  individuals who meet SARS-CoV-2 clinical and/or epidemiological  criteria. Test performance is unknown in asymptomatic patients. This  test is for in vitro diagnostic use under the FDA EUA for  laboratories certified under CLIA to perform high complexity testing.  This test has not been FDA cleared or approved. A negative result  does not rule out the presence of PCR inhibitors in the specimen or  target RNA in concentration below the limit of detection for the  assay. The possibility of a false negative should be considered if  the patient's recent exposure or clinical presentation suggests  COVID-19. This test was validated by the [facility]. This laboratory is certified under  the Clinical Laboratory Improvement Amendments of 1988 (CLIA-88) as  qualified to perform high complexity laboratory testing.     Lipid panel reflex to direct LDL Non-fasting   Result Value Ref  Range    Cholesterol 219 (H) <200 mg/dL    Triglycerides 166 (H) <150 mg/dL    Direct Measure HDL 44 (L) >=50 mg/dL    LDL Cholesterol Calculated 142 (H) <=100 mg/dL    Non HDL Cholesterol 175 (H) <130 mg/dL    Patient Fasting > 8hrs? No     Narrative    Cholesterol  Desirable:  <200 mg/dL    Triglycerides  Normal:  Less than 150 mg/dL  Borderline High:  150-199 mg/dL  High:  200-499 mg/dL  Very High:  Greater than or equal to 500 mg/dL    Direct Measure HDL  Female:  Greater than or equal to 50 mg/dL   Male:  Greater than or equal to 40 mg/dL    LDL Cholesterol  Desirable:  <100mg/dL  Above Desirable:  100-129 mg/dL   Borderline High:  130-159 mg/dL   High:  160-189 mg/dL   Very High:  >= 190 mg/dL    Non HDL Cholesterol  Desirable:  130 mg/dL  Above Desirable:  130-159 mg/dL  Borderline High:  160-189 mg/dL  High:  190-219 mg/dL  Very High:  Greater than or equal to 220 mg/dL   TSH WITH FREE T4 REFLEX   Result Value Ref Range    TSH 2.67 0.40 - 4.00 mU/L   Comprehensive metabolic panel   Result Value Ref Range    Sodium 139 133 - 144 mmol/L    Potassium 3.6 3.4 - 5.3 mmol/L    Chloride 104 94 - 109 mmol/L    Carbon Dioxide (CO2) 29 20 - 32 mmol/L    Anion Gap 6 3 - 14 mmol/L    Urea Nitrogen 13 7 - 30 mg/dL    Creatinine 0.51 (L) 0.52 - 1.04 mg/dL    Calcium 10.0 8.5 - 10.1 mg/dL    Glucose 95 70 - 99 mg/dL    Alkaline Phosphatase 82 40 - 150 U/L    AST 18 0 - 45 U/L    ALT 29 0 - 50 U/L    Protein Total 8.3 6.8 - 8.8 g/dL    Albumin 4.1 3.4 - 5.0 g/dL    Bilirubin Total 0.5 0.2 - 1.3 mg/dL    GFR Estimate >90 >60 mL/min/1.73m2      Comment:      Effective December 21, 2021 eGFRcr in adults is calculated using the 2021 CKD-EPI creatinine equation which includes age and gender (Mansoor diallo al., NEJM, DOI: 10.1056/LTMDlj7286982)   Vitamin B12   Result Value Ref Range    Vitamin B12 581 193 - 986 pg/mL   Vitamin D Deficiency   Result Value Ref Range    Vitamin D, Total (25-Hydroxy) 30 20 - 75 ug/L    Narrative     Season, race, dietary intake, and treatment affect the concentration of 25-hydroxy-Vitamin D. Values may decrease during winter months and increase during summer months. Values 20-29 ug/L may indicate Vitamin D insufficiency and values <20 ug/L may indicate Vitamin D deficiency.    Vitamin D determination is routinely performed by an immunoassay specific for 25 hydroxyvitamin D3.  If an individual is on vitamin D2(ergocalciferol) supplementation, please specify 25 OH vitamin D2 and D3 level determination by LCMSMS test VITD23.     Hemoglobin A1c   Result Value Ref Range    Hemoglobin A1C 6.2 (H) 0.0 - 5.6 %      Comment:      Normal <5.7%   Prediabetes 5.7-6.4%    Diabetes 6.5% or higher     Note: Adopted from ADA consensus guidelines.   CBC with platelets and differential   Result Value Ref Range    WBC Count 11.0 4.0 - 11.0 10e3/uL    RBC Count 4.90 3.80 - 5.20 10e6/uL    Hemoglobin 13.4 11.7 - 15.7 g/dL    Hematocrit 41.8 35.0 - 47.0 %    MCV 85 78 - 100 fL    MCH 27.3 26.5 - 33.0 pg    MCHC 32.1 31.5 - 36.5 g/dL    RDW 15.7 (H) 10.0 - 15.0 %    Platelet Count 396 150 - 450 10e3/uL    % Neutrophils 63 %    % Lymphocytes 26 %    % Monocytes 10 %    % Eosinophils 2 %    % Basophils 0 %    Absolute Neutrophils 6.9 1.6 - 8.3 10e3/uL    Absolute Lymphocytes 2.8 0.8 - 5.3 10e3/uL    Absolute Monocytes 1.0 0.0 - 1.3 10e3/uL    Absolute Eosinophils 0.2 0.0 - 0.7 10e3/uL    Absolute Basophils 0.0 0.0 - 0.2 10e3/uL       If you have any questions or concerns, please call the clinic at the number listed above.       Sincerely,      Drea Denise PA-C

## 2022-01-07 NOTE — PROGRESS NOTES
Assessment & Plan     Skin tag  Cryotherapy. Given care instructions. Can repeat in 2 weeks.   - DESTRUCT BENIGN LESION, UP TO 14  - REMOVAL OF SKIN TAGS, FIRST 15  - REMOVAL OF SKIN TAGS, EA ADDTL 10    Cough  Advised likely viral, tested for COVID. Augmentin only if symptoms not improvine.   - Symptomatic; Unknown COVID-19 Virus (Coronavirus) by PCR; Future  - amoxicillin-clavulanate (AUGMENTIN) 875-125 MG tablet; Take 1 tablet by mouth 2 times daily  - Symptomatic; Unknown COVID-19 Virus (Coronavirus) by PCR Nose    Screening cholesterol level  - Lipid panel reflex to direct LDL Non-fasting; Future  - Lipid panel reflex to direct LDL Non-fasting    Generalized muscle weakness  - Hemoglobin A1c; Future  - Vitamin B12; Future  - TSH WITH FREE T4 REFLEX; Future  - CBC with Platelets & Differential; Future  - CBC with Platelets & Differential  - TSH WITH FREE T4 REFLEX  - Vitamin B12  - Hemoglobin A1c    Avitaminosis D  - Vitamin D Deficiency; Future  - Vitamin D Deficiency    Hypokalemia  - Comprehensive metabolic panel; Future  - Comprehensive metabolic panel    Itching  - cetirizine (ZYRTEC) 10 MG tablet; Take 1 tablet (10 mg) by mouth daily    Due for labs and needing refills.                    Return in about 3 months (around 4/7/2022) for BP Recheck.    Drea Denise PA-C  [facility] MADHURI Wei is a 67 year old who presents for the following health issues     HPI     Hypertension Follow-up      Do you check your blood pressure regularly outside of the clinic? No     Are you following a low salt diet? Yes    Are your blood pressures ever more than 140 on the top number (systolic) OR more   than 90 on the bottom number (diastolic), for example 140/90? Doesn't check bp      How many servings of fruits and vegetables do you eat daily?  2-3    On average, how many sweetened beverages do you drink each day (Examples: soda, juice, sweet tea, etc.  Do NOT count diet or artificially  sweetened beverages)?   0    How many days per week do you exercise enough to make your heart beat faster? 3 or less    How many minutes a day do you exercise enough to make your heart beat faster? 9 or less    How many days per week do you miss taking your medication? 0      Pt would like for her skin tags      Started coughing 1 week ago. Notes in the past she took Augment[place]. Has a history of coughs, feels similar. Has not been vaccinated for COVID and has not tested for COVID.   \Not using her inhalers. Just wants antibiotics.        Review of Systems         Objective    BP (!) 145/83 (BP Location: Left arm, Patient Position: Chair, Cuff Size: Adult Large)   Pulse 92   Temp 97.7  F (36.5  C) (Oral)   Wt 77.1 kg (170 lb)   Breastfeeding No   BMI 30.11 kg/m    Body mass index is 30.11 kg/m .  Physical Exam   Skin: dark pigmented skin tags surrounding the neck. Treated with cryotherapy x35 skin tags and patient tolerated well.   Lungs CTA, no wheezing.

## 2022-01-07 NOTE — NURSING NOTE
Roomed pt with mask, shield and gloves. Pt disclosed with provider that she had a cough. Pt did not disclose with me that she had any symptoms. Travel screening was completed and pt did not disclose any covid symptoms.     Regina YIN MA

## 2022-01-08 LAB — DEPRECATED CALCIDIOL+CALCIFEROL SERPL-MC: 30 UG/L (ref 20–75)

## 2022-01-09 LAB — SARS-COV-2 RNA RESP QL NAA+PROBE: NEGATIVE

## 2022-01-10 NOTE — RESULT ENCOUNTER NOTE
Sanjuana,     Your labs show an improvement in your cholesterol. Your sugar level is elevated in the pre-diabetic range.   You should work on increased exercise and weight loss to prevent th development of diabetes.   Your COVID test was negative.   Drea Denise PA-C

## 2022-01-14 DIAGNOSIS — R42 VERTIGO: ICD-10-CM

## 2022-01-14 NOTE — TELEPHONE ENCOUNTER
Routing refill request to provider for review/approval because:  Sig states use 14 days. Pt also overdue for appt.  Simi Lopez RN

## 2022-01-17 RX ORDER — FLUTICASONE PROPIONATE 50 MCG
SPRAY, SUSPENSION (ML) NASAL
Qty: 16 G | Refills: 0 | Status: SHIPPED | OUTPATIENT
Start: 2022-01-17 | End: 2022-01-24

## 2022-01-18 NOTE — PROGRESS NOTES
Nurse Note      Itinerary:  [facility] with [place] transit      Departure Date: 1/27/22      Return Date: 3/27/22      Length of Trip 3 months      Reason for Travel: Visiting friends and relatives           Urban or rural: urban      Accommodations: Family home        IMMUNIZATION HISTORY  Have you received any immunizations within the past 4 weeks?  No  Have you ever fainted from having your blood drawn or from an injection?  No  Have you ever had a fever reaction to vaccination?  No  Have you ever had any bad reaction or side effect from any vaccination?  No  Have you ever had hepatitis A or B vaccine?  No  Do you live (or work closely) with anyone who has AIDS, an AIDS-like condition, any other immune disorder or who is on chemotherapy for cancer?  No  Do you have a family history of immunodeficiency?  No  Have you received any injection of immune globulin or any blood products during the past 12 months?  No    Patient roomed by     Tomás Salamanca is a 67 year old female seen today with adult son for counsultation for international travel.   Patient will be departing in  1 week(s) and  traveling with adult son .      Patient itinerary :  [place] of [facility] which risk for Malaria, food borne illnesses, motor vehicle accidents and Covid. exposure.      Patient's activities will include visiting friends and relatives.    Patient's country of birth is [place]    Special medical concerns: Hypertension  Pre-travel questionnaire was completed by patient and reviewed by provider.     Vitals: There were no vitals taken for this visit.  BMI= There is no height or weight on file to calculate BMI.    EXAM:  General:  Well-nourished, well-developed in no acute distress.  Appears to be stated age, interacts appropriately and expresses understanding of information given to patient.    Current Outpatient Medications   Medication Sig Dispense Refill     ACETAMINOPHEN EXTRA STRENGTH 500 MG  tablet TAKE 2 TABLETS(1000 MG) BY MOUTH EVERY 8 HOURS AS NEEDED FOR MILD PAIN 100 tablet 1     albuterol (PROAIR HFA/PROVENTIL HFA/VENTOLIN HFA) 108 (90 Base) MCG/ACT inhaler INHALE 2 PUFFS INTO THE LUNGS EVERY 6 HOURS AS NEEDED FOR SHORTNESS OF BREATH OR DIFFICULT BREATHING OR WHEEZING 18 g 0     amLODIPine (NORVASC) 10 MG tablet Take 1 tablet (10 mg) by mouth daily 90 tablet 1     amoxicillin-clavulanate (AUGMENTIN) 875-125 MG tablet Take 1 tablet by mouth 2 times daily 20 tablet 0     atorvastatin (LIPITOR) 20 MG tablet Take 1 tablet (20 mg) by mouth daily 90 tablet 3     cetirizine (ZYRTEC) 10 MG tablet Take 1 tablet (10 mg) by mouth daily 90 tablet 3     cholecalciferol 25 MCG (1000 UT) TABS Take 1,000 Units by mouth daily 100 tablet 3     diclofenac (VOLTAREN) 75 MG EC tablet TAKE 1 TABLET(75 MG) BY MOUTH TWICE DAILY AS NEEDED FOR MODERATE PAIN 60 tablet 1     fluticasone (FLONASE) 50 MCG/ACT nasal spray SHAKE LIQUID AND USE 1 SPRAY IN EACH NOSTRIL TWICE DAILY FOR 14 DAYS 16 g 0     ketotifen (ZADITOR) 0.025 % ophthalmic solution Place 1 drop Into the left eye 2 times daily 10 mL 0     Multiple Vitamins-Minerals (CENTROVITE) TABS Take 1 tablet by mouth daily 30 tablet 11     omeprazole (PRILOSEC) 20 MG DR capsule Take 1 capsule (20 mg) by mouth daily 180 capsule 1     polyethylene glycol (MIRALAX) 17 GM/Dose powder MIX 17 GRAMS INTO LIQUID AND DRINK BY MOUTH DAILY 510 g 11     potassium chloride ER (KLOR-CON M) 10 MEQ CR tablet TAKE 1 TABLET(10 MEQ) BY MOUTH DAILY 90 tablet 1     Spacer/Aero-Holding Chambers (E-Z SPACER) CAMRON Use with inhalers. 1 each 0     tretinoin (RETIN-A) 0.025 % external gel Apply topically At Bedtime 20 g 0     Patient Active Problem List   Diagnosis     Avitaminosis D     Esophageal reflux     OA (osteoarthritis) of knee     Advanced directives, counseling/discussion     Hyperlipidemia LDL goal <160     Cataracts, both eyes     Dry eyes     Hypokalemia     Prediabetes     Essential  hypertension with goal blood pressure less than 140/90     Health Care Home     Allergies   Allergen Reactions     Valsartan Cough         Immunizations discussed include:   Covid 19: strongly encouraged, declined  Hepatitis A:  Declined  All shots today  Hepatitis B: Declined  All shots today  Influenza: Declined  All shots today  Typhoid: Declined  All shots today  Rabies: Declined  All shots today  Yellow Fever: Not indicated  Japanese Encephalitis: Not indicated  Meningococcus: Not indicated  Tetanus/Diphtheria: Up to date  Measles/Mumps/Rubella: Declined  All shots today  Cholera: Not needed  Polio: Declined  All shots today  Pneumococcal: Declined  Declined  All shots today  Varicella: Immune by disease history per patient report  Shingrix: Declined  All shots today  HPV:  Not indicated     TB: consider post travel testing    Altitude Exposure on this trip: no  Past tolerance to Altitude: na    ASSESSMENT/PLAN:  Sanjuana was seen today for travel clinic.    Diagnoses and all orders for this visit:    Travel advice encounter  -     Discontinue: atovaquone-proguanil (MALARONE) 250-100 MG tablet; Take 1 tablet by mouth daily Start 2 days before exposure to Malaria and continue daily till  7 days after exposure.  -     Discontinue: azithromycin (ZITHROMAX) 250 MG tablet; Two tablets first day, then one tablet daily for four days for severe diarrhea during travel  -     atovaquone-proguanil (MALARONE) 250-100 MG tablet; Take 1 tablet by mouth daily Start 2 days before exposure to Malaria and continue daily till  7 days after exposure.  -     azithromycin (ZITHROMAX) 500 MG tablet; Take 1 tablet (500 mg) by mouth daily for 3 doses Take 1 tablet a day for up to 3 days for severe diarrhea      I have reviewed general recommendations for safe travel   including: food/water precautions, insect precautions, safer sex   practices given high prevalence of Zika, HIV and other STDs,   roadway safety. Educational materials and  Kirill report provided.    Malaraia prophylaxis recommended: Malarone  Symptomatic treatment for traveler's diarrhea: azithromycin  Altitude illness prevention and treatment: na    Personal protective measures reviewed including hand sanitizing and contact precautions for the prevention of viral illnesses. Cover coughs and masking  during travel and upon return.  Current COVID 19 pandemic.   Monitor / follow current CDC guidelines.    Country specific and CDC Covid 19  testing requirements and resources given to patient.      Evacuation insurance advised and resources were provided to patient.    Total visit time 30 minutes  with over 50% of time spent counseling patient as detailed above.    Dayan Mills CNP

## 2022-01-19 ENCOUNTER — OFFICE VISIT (OUTPATIENT)
Dept: FAMILY MEDICINE | Facility: CLINIC | Age: 67
End: 2022-01-19
Payer: COMMERCIAL

## 2022-01-19 VITALS
BODY MASS INDEX: 30.13 KG/M2 | HEART RATE: 81 BPM | TEMPERATURE: 97.1 F | SYSTOLIC BLOOD PRESSURE: 117 MMHG | OXYGEN SATURATION: 98 % | WEIGHT: 170.1 LBS | DIASTOLIC BLOOD PRESSURE: 74 MMHG

## 2022-01-19 DIAGNOSIS — Z71.84 TRAVEL ADVICE ENCOUNTER: Primary | ICD-10-CM

## 2022-01-19 PROCEDURE — 99402 PREV MED CNSL INDIV APPRX 30: CPT | Performed by: NURSE PRACTITIONER

## 2022-01-19 RX ORDER — AZITHROMYCIN 250 MG/1
TABLET, FILM COATED ORAL
Qty: 6 TABLET | Refills: 0 | Status: SHIPPED | OUTPATIENT
Start: 2022-01-19 | End: 2022-01-19

## 2022-01-19 RX ORDER — AZITHROMYCIN 500 MG/1
500 TABLET, FILM COATED ORAL DAILY
Qty: 3 TABLET | Refills: 0 | Status: SHIPPED | OUTPATIENT
Start: 2022-01-19 | End: 2022-01-22

## 2022-01-19 RX ORDER — ATOVAQUONE AND PROGUANIL HYDROCHLORIDE 250; 100 MG/1; MG/1
1 TABLET, FILM COATED ORAL DAILY
Qty: 15 TABLET | Refills: 0 | Status: SHIPPED | OUTPATIENT
Start: 2022-01-19 | End: 2022-01-19

## 2022-01-19 RX ORDER — ATOVAQUONE AND PROGUANIL HYDROCHLORIDE 250; 100 MG/1; MG/1
1 TABLET, FILM COATED ORAL DAILY
Qty: 100 TABLET | Refills: 0 | Status: SHIPPED | OUTPATIENT
Start: 2022-01-19

## 2022-01-19 NOTE — PATIENT INSTRUCTIONS
Thank you for visiting the [facility] Travel [facility] : [phone]  Today January 19, 2022 you received the    No Vaccines were given today     Follow up vaccine appointments can be made as a NURSE ONLY visit at the Travel Clinic, (BE PREPARED TO WAIT, ) or at designated [facility].    If you are receiving the Rabies vaccines series, it is important that you follow the exact schedule ordered.     Pre-travel     We recommend that you purchase Medical Evacuation Insurance prior to your departure.  Https://wwwnc.cdc.gov/travel/page/insurance    [place] your travel plans with the  Department of [employer] through STEP ( Smart Traveler Enrollment Program ) https://step.state.gov.  STEP is a free service to allow U[place] citizens and nationals traveling and living abroad to enroll their trip with the nearest U.S. Embassy or Consulate.    Animal Exposure: Avoid all mammals even if they look healthy.  If there is a bite, scratch or even a lick, wash area immediately with soap and water for 15 minutes and seek medical care within 24 hours for evaluation of Rabies post exposure treatment.  Contact your Medical Evacuation Insurance.    COVID 19 (Sars Cov2) prevention strategies  Physical distancing: Maintain 6 foot (2m) from others.              Avoid large gatherings and public transportation.   Avoid indoor shopping malls, theaters and restaurants   Practice consistent mask wearing covering the nose, mouth and underneath the chin when unable to maintain 6 foot distance from others.  Hand washing: frequent, thorough handwashing with soap and water for 20 seconds (or using a hand  containing 60% alcohol)   Avoid touching face, nose, eyes, mouth unless you have done appropriate hand washing as above.   Clean high touch surfaces with approved disinfectant against Covid 19  (70% Ethanol ) or a bleach solution (add 20 mL (4 teaspoons) of bleach to 1 L (1 quart) of water;)  Be careful not to  breath or touch bleach.      Travel Covid 19 Testing:  updated 12/06/2021  International travelers: Pre-travel: diagnostic testing (antigen or PCR) may be required for entry:  See country specific Embassy websites or airline websites.    US ENTRY Requirements: Effective December 6, 2021, all international arrivals to the [place] (regardless of vaccination status or citizenship status) by air are required to have a negative predeparture COVID-19 result from a test taken no more than 1 calendar day prior to departure of the flight to the [place]. Many complex scenarios may result from the 1-day rule. For example, for a flight that arrives in the [place] on a Monday, the test must have been taken no earlier than Sunday local time in the departure city. If the itinerary contains multiple flights, the test can be taken 1 day prior to departure of the first flight or can be taken en route, as long as the connecting airport is not in the [place]. If the test is unable to be taken en route, the traveler will not be able to enter the [place], or if the test is taken en route and is positive, the traveler will have to isolate in that location. If the itinerary contains 1 or more overnight stays en route to the [place], the test must have been taken 1 calendar day before the flight that will enter the [place]; however, if the itinerary has an overnight connection due to limitations in flight availability, retesting will not be required. If the first flight within an itinerary is delayed due to severe weather, aircraft mechanical issues, or other issues outside of the traveler's control, the traveler will only need to be retested if the delay causes the original test to be 24 hours or more past the 1-day window. If a connecting flight is delayed due to any of the above issues, the traveler will only need to be retested if the delay causes the original test to be 48 hours or more past the 1-day window. If a trip of less than 1 day is made out the [place], a viral test  taken in the [place] may be used as a predeparture test as long as the test was taken no more than 1 day prior to rearrival in the [place]; however, if a delay occurs on the return trip and the predeparture test is out of the 1-day window, the traveler will need to be retested before returning to the [place]. Noncitizen nonimmigrants who are unvaccinated remain banned from entry    Post travel: CDC recommends getting tested 3-5 days after your trip AND stay home and self-quarantine for 7 days.      COVID-19 testing scheduling number for pre-travel through [facility]  [phone] (Must have an order). Available 24 hours a day.  You can also schedule through My Chart.     Post-travel illness:  Contact your provider or [facility] if you develop a fever, rash, cough, diarrhea or other symptoms for up to 1 year after travel.  Inform your healthcare provider when and where you traveled to.    Please call the [facility] with any questions [phone]  Or send your provider a 'My Chart' note.

## 2022-01-20 ENCOUNTER — TELEPHONE (OUTPATIENT)
Dept: FAMILY MEDICINE | Facility: CLINIC | Age: 67
End: 2022-01-20
Payer: COMMERCIAL

## 2022-01-20 NOTE — TELEPHONE ENCOUNTER
Routing to PCP    Patient will be traveling out of the country for 2 months and needs travel Covid test.    Patient does not have My Chart.  Leaving 1/27.    Order pended if agreeable.  Patient has virtual visit Monday to discuss the travel. Daughter wanting test prior.        RN received call from patients daughter.    Daughter requesting travel Covid Test and visit to review medications prior to leaving the country..  Patient leaving 1/27 so daughter wanting patient to be tested soon.    RN assisted in scheduling patient.      Ronn Carlos RN, BSN, PHN  [facility]

## 2022-01-21 NOTE — TELEPHONE ENCOUNTER
Patient no longer needs a provider order to schedule a COVID test. Ok to schedule test for patient. Patient and family should be aware there are specific timing requirements for certain countries which is usually 72 hours so they may want to look at that before scheduling.   Drea Denise PA-C

## 2022-01-21 NOTE — TELEPHONE ENCOUNTER
Patient notified of provider message as written. Patient verbalized understanding.     Priscilla Andujar RN

## 2022-01-24 ENCOUNTER — OFFICE VISIT (OUTPATIENT)
Dept: FAMILY MEDICINE | Facility: CLINIC | Age: 67
End: 2022-01-24
Payer: COMMERCIAL

## 2022-01-24 VITALS
BODY MASS INDEX: 29.95 KG/M2 | WEIGHT: 169 LBS | HEIGHT: 63 IN | TEMPERATURE: 98 F | DIASTOLIC BLOOD PRESSURE: 86 MMHG | HEART RATE: 90 BPM | OXYGEN SATURATION: 97 % | SYSTOLIC BLOOD PRESSURE: 146 MMHG

## 2022-01-24 DIAGNOSIS — L81.1 MELASMA: ICD-10-CM

## 2022-01-24 DIAGNOSIS — I10 ESSENTIAL HYPERTENSION WITH GOAL BLOOD PRESSURE LESS THAN 140/90: Primary | ICD-10-CM

## 2022-01-24 DIAGNOSIS — R42 VERTIGO: ICD-10-CM

## 2022-01-24 DIAGNOSIS — Z71.84 COUNSELING ABOUT TRAVEL: ICD-10-CM

## 2022-01-24 DIAGNOSIS — J45.41 MODERATE PERSISTENT REACTIVE AIRWAY DISEASE WITH ACUTE EXACERBATION: ICD-10-CM

## 2022-01-24 PROCEDURE — 99214 OFFICE O/P EST MOD 30 MIN: CPT | Performed by: PHYSICIAN ASSISTANT

## 2022-01-24 RX ORDER — FLUTICASONE PROPIONATE 50 MCG
SPRAY, SUSPENSION (ML) NASAL
Qty: 16 G | Refills: 0 | Status: SHIPPED | OUTPATIENT
Start: 2022-01-24

## 2022-01-24 RX ORDER — ALBUTEROL SULFATE 90 UG/1
AEROSOL, METERED RESPIRATORY (INHALATION)
Qty: 36 G | Refills: 0 | Status: SHIPPED | OUTPATIENT
Start: 2022-01-24

## 2022-01-24 RX ORDER — TRETINOIN 0.25 MG/G
GEL TOPICAL AT BEDTIME
Qty: 45 G | Refills: 1 | Status: SHIPPED | OUTPATIENT
Start: 2022-01-24

## 2022-01-24 ASSESSMENT — MIFFLIN-ST. JEOR: SCORE: 1266.74

## 2022-01-24 NOTE — PROGRESS NOTES
"  Assessment & Plan     1. Essential hypertension with goal blood pressure less than 140/90    2. Moderate persistent reactive airway disease with acute exacerbation    3. Melasma    4. Vertigo    5. Counseling about travel      Patient has already seen the travel clinic. Patient had questions about COVID testing and timing. She was directed to her airline for information. Advised patient and family to set up mychart to receive results quickly.   Patient to notify pharmacy she will be gone for 3 months, has enough refills to cover that time period.   Blood pressure not controlled, however as she is leaving I am not comfortable starting a new medication. She will be working on increase exercise and diet changes while gone. Follow up when she returns to the [place].              BMI:   Estimated body mass index is 30.18 kg/m  as calculated from the following:    Height as of this encounter: 1.594 m (5' 2.75\").    Weight as of this encounter: 76.7 kg (169 lb).   Weight management plan: Discussed healthy diet and exercise guidelines        No follow-ups on file.    Drea Denise PA-C  [facility] MADHURI Wei is a 67 year old who presents for the following health issues     HPI     Hypertension Follow-up      Do you check your blood pressure regularly outside of the clinic? No     Are you following a low salt diet? Yes    Are your blood pressures ever more than 140 on the top number (systolic) OR more   than 90 on the bottom number (diastolic), for example 140/90? Has not been checking bps      How many servings of fruits and vegetables do you eat daily?  2-3    On average, how many sweetened beverages do you drink each day (Examples: soda, juice, sweet tea, etc.  Do NOT count diet or artificially sweetened beverages)?   0    How many days per week do you exercise enough to make your heart beat faster? 3 or less    How many minutes a day do you exercise enough to make your heart beat faster? 9 " "or less    How many days per week do you miss taking your medication? 0    Pt has questions on when she should do her travel covid testing.       Patient leaving for [place] on 1/27/22. She will be gone for 3 months. Wants to make sure she has enough medication.   Has appointment for COVID testing tomorrow.       Review of Systems   Constitutional, HEENT, cardiovascular, pulmonary, gi and gu systems are negative, except as otherwise noted.      Objective    BP (!) 146/85 (BP Location: Left arm, Patient Position: Chair, Cuff Size: Adult Large)   Pulse 90   Temp 98  F (36.7  C) (Oral)   Ht 1.594 m (5' 2.75\")   Wt 76.7 kg (169 lb)   SpO2 97%   Breastfeeding No   BMI 30.18 kg/m    Body mass index is 30.18 kg/m .  Physical Exam   GENERAL: healthy, alert and no distress  PSYCH: mentation appears normal, affect normal/bright                "

## 2022-01-25 ENCOUNTER — LAB (OUTPATIENT)
Dept: LAB | Facility: CLINIC | Age: 67
End: 2022-01-25
Payer: COMMERCIAL

## 2022-01-25 DIAGNOSIS — Z20.822 ENCOUNTER FOR LABORATORY TESTING FOR COVID-19 VIRUS: ICD-10-CM

## 2022-01-25 PROCEDURE — U0003 INFECTIOUS AGENT DETECTION BY NUCLEIC ACID (DNA OR RNA); SEVERE ACUTE RESPIRATORY SYNDROME CORONAVIRUS 2 (SARS-COV-2) (CORONAVIRUS DISEASE [COVID-19]), AMPLIFIED PROBE TECHNIQUE, MAKING USE OF HIGH THROUGHPUT TECHNOLOGIES AS DESCRIBED BY CMS-2020-01-R: HCPCS

## 2022-01-25 PROCEDURE — U0005 INFEC AGEN DETEC AMPLI PROBE: HCPCS

## 2022-01-26 LAB — SARS-COV-2 RNA RESP QL NAA+PROBE: NEGATIVE

## 2022-02-21 DIAGNOSIS — M17.9 OSTEOARTHRITIS OF KNEE, UNSPECIFIED LATERALITY, UNSPECIFIED OSTEOARTHRITIS TYPE: ICD-10-CM

## 2022-02-21 RX ORDER — DICLOFENAC SODIUM 75 MG/1
TABLET, DELAYED RELEASE ORAL
Qty: 60 TABLET | Refills: 1 | Status: SHIPPED | OUTPATIENT
Start: 2022-02-21 | End: 2023-02-22

## 2022-02-21 NOTE — TELEPHONE ENCOUNTER
Routing refill request to provider for review/approval because:   Blood pressure under 140/90 in past 12 months    Patient is age 6-64 years    Normal serum creatinine on file in past 12 months         BP Readings from Last 3 Encounters:   01/24/22 (!) 146/86   01/19/22 117/74   01/07/22 138/88      Creatinine   Date Value Ref Range Status   01/07/2022 0.51 (L) 0.52 - 1.04 mg/dL Final   01/05/2021 0.62 0.52 - 1.04 mg/dL Final        Pending Prescriptions:                       Disp   Refills    diclofenac (VOLTAREN) 75 MG EC tablet [Pha*60 tab*1        Sig: TAKE 1 TABLET(75 MG) BY MOUTH TWICE DAILY AS NEEDED           FOR MODERATE PAIN        Yang Hanson RN

## 2022-03-13 ENCOUNTER — HEALTH MAINTENANCE LETTER (OUTPATIENT)
Age: 67
End: 2022-03-13

## 2022-04-04 DIAGNOSIS — I10 ESSENTIAL HYPERTENSION WITH GOAL BLOOD PRESSURE LESS THAN 140/90: ICD-10-CM

## 2022-04-06 RX ORDER — AMLODIPINE BESYLATE 5 MG/1
TABLET ORAL
Qty: 90 TABLET | Refills: 1 | OUTPATIENT
Start: 2022-04-06

## 2022-04-29 NOTE — TELEPHONE ENCOUNTER
"Requested Prescriptions   Pending Prescriptions Disp Refills     polyethylene glycol (MIRALAX) powder [Pharmacy Med Name: POLYETH GLYCOL 3350 NF POWDER 510GM] 510 g      Sig: MIX 17 GRAMS INTO LIQUID AND DRINK BY MOUTH EVERY DAY   Last Written Prescription Date:  1/30/20  Last Fill Quantity: 527,  # refills: 0   Last office visit: 12/5/2019 with prescribing provider:     Future Office Visit:        Laxatives Protocol Passed - 3/4/2020  2:26 PM        Passed - Patient is age 6 or older        Passed - Recent (12 mo) or future (30 days) visit within the authorizing provider's specialty     Patient has had an office visit with the authorizing provider or a provider within the authorizing providers department within the previous 12 mos or has a future within next 30 days. See \"Patient Info\" tab in inbasket, or \"Choose Columns\" in Meds & Orders section of the refill encounter.              Passed - Medication is active on med list        Multiple Vitamins-Minerals (CENTROVITE) TABS [Pharmacy Med Name: CEROVITE ADV FORMULA TAB] 30 tablet      Sig: TAKE 1 TABLET BY MOUTH ONCE DAILY         Last Written Prescription Date:  na  Last Fill Quantity: na,   # refills: na  Last Office Visit: 12/5/19  Future Office visit:       Routing refill request to provider for review/approval because:  Drug not active on patient's medication list      Vitamin Supplements (Adult) Protocol Failed - 3/4/2020  2:26 PM        Failed - Medication is active on med list        Passed - High dose Vitamin D not ordered        Passed - Recent (12 mo) or future (30 days) visit within the authorizing provider's specialty     Patient has had an office visit with the authorizing provider or a provider within the authorizing providers department within the previous 12 mos or has a future within next 30 days. See \"Patient Info\" tab in inbasket, or \"Choose Columns\" in Meds & Orders section of the refill encounter.                "
Miralax:  Prescription approved per [facility] Refill Protocol.      Multivitamin:Routing refill request to provider for review/approval because:  Drug not active on patient's medication list but it was on in the past    Kim Reardon RN  [facility]            
Will continue current insulin regimen for now. Will continue monitoring FS, log, and FU.  Can likely DC on Metformin 1000mg BID, endo FU 3 months.  Patient counseled for compliance with consistent low carb diet and exercise as tolerated outpatient.

## 2022-06-02 ENCOUNTER — TELEPHONE (OUTPATIENT)
Dept: FAMILY MEDICINE | Facility: CLINIC | Age: 67
End: 2022-06-02
Payer: COMMERCIAL

## 2022-06-02 NOTE — LETTER
Allyn 15, 2022    Sanjuana Salamanca  [address]  [address]  [place] [address]      Dear Sanjuana Salamanca,     We have tried to contact you about your health, but have been unable to reach you.  Please call us as soon as possible so we can provide you with the best care possible.  We will continue to check in with you throughout the year to complete these items of care, if you are not able to complete these items at this time.  If you would like to complete the missing items for your care, please contact us at [phone].    We recommend the following:  -schedule a FOLLOWUP OFFICE APPOINTMENT with your provider.     -schedule a MAMMOGRAM 1 in 8 women will develop invasive breast cancer during her lifetime and it is the most common non-skin cancer in American women.  EARLY detection, new treatments, and a better understanding of the disease have increased survival rates - the 5 year survival rate in the 1960s was 63% and today it is close to 90% .  Please disregard this reminder if you have had this exam elsewhere within the last year.  It would be helpful for us to have a copy of your mammogram report in our file so that we can best coordinate your care - please contact us with when your test was done so we can update your record. Please call [phone] to schedule your mammogram today.   -schedule a PHYSICAL with your provider.        Sincerely,     Your Care Team at [place]

## 2022-06-02 NOTE — TELEPHONE ENCOUNTER
Patient Quality Outreach    Patient is due for the following:   Hypertension -  Hypertension follow-up visit  Breast Cancer Screening - Mammogram  Physical  - Due after 1/1/2020  Immunizations  -  Covid, Pneumococcal and Zoster    NEXT STEPS:   Schedule a yearly physical    Type of outreach:    Sent ResolutionTube message.      Questions for provider review:    None     Regina Madrid, CMA

## 2022-06-15 NOTE — TELEPHONE ENCOUNTER
Patient Quality Outreach    Patient is due for the following:   Hypertension -  Hypertension follow-up visit  Breast Cancer Screening - Mammogram  Physical  - Due after 1/1/2020  Immunizations  -  Covid, Pneumococcal and Zoster    NEXT STEPS:   Schedule a office visit for Hypertension yearly physical    Type of outreach:    Sent letter.    Next Steps:  Reach out within 90 days via Letter.    Max number of attempts reached: Yes. Will try again in 90 days if patient still on fail list.    Questions for provider review:    None     Regina Madrid, CMA

## 2022-07-13 ENCOUNTER — PATIENT OUTREACH (OUTPATIENT)
Dept: GERIATRIC MEDICINE | Facility: CLINIC | Age: 67
End: 2022-07-13

## 2022-07-13 NOTE — PROGRESS NOTES
[facility] Care Coordination Contact     W, attempted to [facility] mbr on preventative care. Unable to lv voice mail.      LORRIE Glass  [facility]  [phone]

## 2022-07-24 DIAGNOSIS — I10 ESSENTIAL HYPERTENSION WITH GOAL BLOOD PRESSURE LESS THAN 140/90: ICD-10-CM

## 2022-07-25 RX ORDER — AMLODIPINE BESYLATE 10 MG/1
TABLET ORAL
Qty: 90 TABLET | Refills: 1 | Status: SHIPPED | OUTPATIENT
Start: 2022-07-25

## 2022-07-25 NOTE — TELEPHONE ENCOUNTER
"Requested Prescriptions   Pending Prescriptions Disp Refills     amLODIPine (NORVASC) 10 MG tablet [Pharmacy Med Name: AMLODIPINE BESYLATE 10MG TABLETS] 90 tablet 1     Sig: TAKE 1 TABLET(10 MG) BY MOUTH DAILY       Calcium Channel Blockers Protocol  Failed - 7/24/2022  7:56 AM        Failed - Blood pressure under 140/90 in past 12 months     BP Readings from Last 3 Encounters:   01/24/22 (!) 146/86   01/19/22 117/74   01/07/22 138/88                 Failed - Normal serum creatinine on file in past 12 months     Recent Labs   Lab Test 01/07/22  1239   CR 0.51*       Ok to refill medication if creatinine is low          Passed - Recent (12 mo) or future (30 days) visit within the authorizing provider's specialty     Patient has had an office visit with the authorizing provider or a provider within the authorizing providers department within the previous 12 mos or has a future within next 30 days. See \"Patient Info\" tab in inbasket, or \"Choose Columns\" in Meds & Orders section of the refill encounter.              Passed - Medication is active on med list        Passed - Patient is age 18 or older        Passed - No active pregnancy on record        Passed - No positive pregnancy test in past 12 months           Routing refill request to provider for review/approval because:  Labs not current:  Cr  BP    ThanksStephie RN  [facility]           "

## 2022-08-17 ENCOUNTER — PATIENT OUTREACH (OUTPATIENT)
Dept: GERIATRIC MEDICINE | Facility: CLINIC | Age: 67
End: 2022-08-17

## 2022-08-18 NOTE — PROGRESS NOTES
No member contact. [facility] Regulatory Update.    Ruth Long RN BSN PHN  [facility] Coordinator  [phone]  Fax:[phone]

## 2022-08-25 ENCOUNTER — PATIENT OUTREACH (OUTPATIENT)
Dept: GERIATRIC MEDICINE | Facility: CLINIC | Age: 67
End: 2022-08-25

## 2022-08-25 NOTE — PROGRESS NOTES
[facility] Care Coordination Contact    Unable to reach member for six month telephone assessment due to out of country per family. Will re-attempt to contact member in 6 months. Notified CMS to send member RUST letter.     Ruth Long RN BSN PHN  [facility] Care Coordinator  [phone]  Fax:[phone]

## 2022-11-20 ENCOUNTER — PATIENT OUTREACH (OUTPATIENT)
Dept: GERIATRIC MEDICINE | Facility: CLINIC | Age: 67
End: 2022-11-20

## 2022-11-20 NOTE — PROGRESS NOTES
Encounter opened due to Regulatory [employer] [place] Update to open FVP Program.    Evelyn Tran  Care Management Specialist  [facility]  [phone]

## 2022-11-20 NOTE — PROGRESS NOTES
Encounter opened due to Regulatory [employer] Update to close FVP Program.    Evelyn Tran  Care Management Specialist  [facility]  [phone]

## 2022-12-06 ENCOUNTER — PATIENT OUTREACH (OUTPATIENT)
Dept: GERIATRIC MEDICINE | Facility: CLINIC | Age: 67
End: 2022-12-06

## 2022-12-06 NOTE — PROGRESS NOTES
[facility] Care Coordination Contact    Made an attempts to reach client with no response.  Spoke with daughter-Terell who reported member is still out of [place].   Completed MMIS entry.  Completed health plan required [employer] POC.    Follow-up Plan: CC will attempt to reach member in six months.    This CC note routed to PCP.    Ruth Long RN BSN PHN  [facility] Care Coordinator  [phone]  Fax:[phone]

## 2022-12-06 NOTE — LETTER
December 7, 2022    TORI LOGAN  [address]    [place] [address]    Dear Tori:     I m your care coordinator. I ve been unable to reach you by phone. I am writing to ask you or your authorized representative to call me at [phone]. If you reach my voicemail, leave a message with your daytime phone number. Include a date and time that I can call you. If you are hearing impaired, call the [place] Relay at 231 or [phone] (fxpsny-lv-espaac relay service).    The reason I am trying to reach you is:     [x] To schedule an assessment  [] For your six (6)-month check-in  [] Other:      Please call me as soon as you receive this letter. I look forward to speaking with you.    Sincerely,    Ruth Long RN, PHN    E-mail: [email or web address]  Phone: [phone]      [employer]        [id number] accepted  [id number]                                                                        B  (08/2022)

## 2022-12-07 NOTE — PROGRESS NOTES
"[facility] Care Coordination Contact    Per CC, mailed client an \"Unable to Contact\" letter.    Evelyn Tran  Care Management Specialist  [facility]  [phone]      "

## 2023-01-14 ENCOUNTER — HEALTH MAINTENANCE LETTER (OUTPATIENT)
Age: 68
End: 2023-01-14

## 2023-01-25 DIAGNOSIS — E55.9 AVITAMINOSIS D: ICD-10-CM

## 2023-01-25 DIAGNOSIS — K21.9 GASTROESOPHAGEAL REFLUX DISEASE, UNSPECIFIED WHETHER ESOPHAGITIS PRESENT: ICD-10-CM

## 2023-01-25 RX ORDER — CHOLECALCIFEROL (VITAMIN D3) 25 MCG
TABLET ORAL
Qty: 100 TABLET | Refills: 3 | Status: SHIPPED | OUTPATIENT
Start: 2023-01-25

## 2023-01-31 ENCOUNTER — TELEPHONE (OUTPATIENT)
Dept: FAMILY MEDICINE | Facility: CLINIC | Age: 68
End: 2023-01-31
Payer: COMMERCIAL

## 2023-01-31 NOTE — LETTER
February 15, 2023    To  Sanjuana Salamanca  [address]    [place] [address]    Your team at [facility] cares about your health. We have reviewed your chart and based on our findings; we are making the following recommendations to better manage your health.     You are in particular need of attention regarding the following:     Asthma Control Test     This screening tool helps us to assess how well your asthma is controlled.Good asthma control leads to fewer asthma symptoms and greater health. If your asthma is not in good control (score is 19 or less) or you have been to the ER or urgent care for your asthma, it is recommended you be seen by your provider for medication and lifestyle adjustments.      Please complete and return the attached Asthma Control Test respond below with you answers for each question: Adult ACT     This is valuable information that is requested by your Care Team.      HYPERTENSION FOLLOW UP: Office Visit  Schedule Annual MAMMOGRAPHY. [facility] number is [phone] or schedule in [employer] (self referral).  1 in 8 women will develop invasive breast cancer during her lifetime and it is the most common non-skin cancer in [place]. EARLY detection, new treatments, and a better understanding of the disease have increased survival rates- the 5 year survival rate in the 1960's was 63% and today it is close to 90%.  Call or MyChart message your clinic to schedule a colonoscopy, schedule/ a FIT Test, or order a Cologuard test. If you are unsure what type of test you need, please call your clinic and speak to clinic staff.   Colon cancer is now the second leading cause of cancer-related deaths in the [place] for both men and women and there are over 130,000 new cases and 50,000 deaths per year from colon cancer. Colonoscopies can prevent 90-95% of these deaths. Problem lesions can be removed before they ever become cancer. This test is not  only looking for cancer, but also getting rid of precancerous lesions.   If you are under/uninsured, we recommend you contact the [employer] Program[employer] is a free colorectal cancer screening program that provides colonoscopies for eligible under/uninsured [place] men and women. If you are interested in receiving a free colonoscopy, please call [employer] at t [phone] (mention code ScopesWeb) to see if you're eligible. Please have them send us the results.   PREVENTATIVE VISIT: Annual Medicare Wellness:Schedule an Annual Medicare Wellness Exam. Please call your [facility] clinic to set up your appointment.    If you have already completed these items, please contact the clinic via phone or   MyChart so your care team can review and update your records. Thank you for   choosing [facility] for your healthcare needs. For any questions,   concerns, or to schedule an appointment please contact our clinic.    Healthy Regards,      Your [facility] Care Team

## 2023-01-31 NOTE — TELEPHONE ENCOUNTER
Patient Quality Outreach    Patient is due for the following:   Asthma  -  ACT needed  Hypertension -  BP check  Colon Cancer Screening  Breast Cancer Screening - Mammogram  Physical Annual Wellness Visit      Topic Date Due     COVID-19 Vaccine (1) Never done     Zoster (Shingles) Vaccine (1 of 2) Never done     Pneumococcal Vaccine (1 - PCV) Never done     Flu Vaccine (1) 09/01/2022       Next Steps:   Schedule a Annual Wellness Visit    Type of outreach:    Sent [employer] message. and Copy of ACT mailed to patient.      Questions for provider review:    None     Regina Madrid, CMA           Pt here for bp check   Has been having some mild dizziness and lightheadedness while adjusting to new medication   bp taken x 2 in left arm with regular cuff   Was 144/90  Pt not currently having any symptoms   Routed to pcp

## 2023-02-15 NOTE — TELEPHONE ENCOUNTER
Patient Quality Outreach    Patient is due for the following:   Asthma  -  ACT needed  Hypertension -  BP check  Colon Cancer Screening  Breast Cancer Screening - Mammogram  Physical Annual Wellness Visit      Topic Date Due     COVID-19 Vaccine (1) Never done     Zoster (Shingles) Vaccine (1 of 2) Never done     Pneumococcal Vaccine (1 - PCV) Never done     Flu Vaccine (1) 09/01/2022       Next Steps:   Schedule a office visit for hypertension and asthma Annual Wellness Visit    Type of outreach:    Sent letter. and Copy of ACT mailed to patient.    Next Steps:  Reach out within 90 days via Letter.    Max number of attempts reached: Yes. Will try again in 90 days if patient still on fail list.    Questions for provider review:    None     Regina Madrid, CMA

## 2023-02-22 DIAGNOSIS — M17.9 OSTEOARTHRITIS OF KNEE, UNSPECIFIED LATERALITY, UNSPECIFIED OSTEOARTHRITIS TYPE: ICD-10-CM

## 2023-02-22 RX ORDER — DICLOFENAC SODIUM 75 MG/1
TABLET, DELAYED RELEASE ORAL
Qty: 60 TABLET | Refills: 1 | Status: SHIPPED | OUTPATIENT
Start: 2023-02-22 | End: 2023-11-01

## 2023-03-13 DIAGNOSIS — E87.6 HYPOKALEMIA: ICD-10-CM

## 2023-03-14 RX ORDER — POTASSIUM CHLORIDE 750 MG/1
TABLET, EXTENDED RELEASE ORAL
Qty: 90 TABLET | Refills: 0 | Status: SHIPPED | OUTPATIENT
Start: 2023-03-14 | End: 2024-01-02

## 2023-04-23 ENCOUNTER — HEALTH MAINTENANCE LETTER (OUTPATIENT)
Age: 68
End: 2023-04-23

## 2023-05-29 ENCOUNTER — PATIENT OUTREACH (OUTPATIENT)
Dept: GERIATRIC MEDICINE | Facility: CLINIC | Age: 68
End: 2023-05-29
Payer: COMMERCIAL

## 2023-05-29 NOTE — PROGRESS NOTES
[facility] Care Coordination Contact      [facility] Six-Month Telephone Assessment    Member is still out of country per daughter Emma. Notifed CMS to send unable to reach letter.    Ruth Long RN BSN PHN  [facility] Care Coordinator  [phone]  Fax:[phone]

## 2023-05-29 NOTE — LETTER
May 30, 2023    TORI LOGAN  [address]    [place] [address]    Dear Tori:     I m your care coordinator. I ve been unable to reach you by phone. I am writing to ask you or your authorized representative to call me at [phone]. If you reach my voicemail, leave a message with your daytime phone number. Include a date and time that I can call you. If you are hearing impaired, call the [place] Relay at 691 or [phone] (aelvtx-xp-qyeoen relay service).    The reason I am trying to reach you is:     [] To schedule an assessment  [x] For your six (6)-month check-in  [] Other:      Please call me as soon as you receive this letter. I look forward to speaking with you.    Sincerely,    Ruth Long RN, PHN    E-mail: [email or web address]  Phone: [phone]      [employer]        [id number] accepted  [id number]                                                                        B  (08/2022)

## 2023-05-30 NOTE — TELEPHONE ENCOUNTER
"[facility] Care Coordination Contact    Per CC, mailed client an \"Unable to Contact\" letter.    Evelyn Tran  Care Management Specialist  [facility]  [phone]      "

## 2023-07-17 ENCOUNTER — TELEPHONE (OUTPATIENT)
Dept: FAMILY MEDICINE | Facility: CLINIC | Age: 68
End: 2023-07-17

## 2023-07-17 NOTE — LETTER
August 22, 2023    To  Sanjuana Salamanca  [address]    [place] [address]    Your team at [facility] cares about your health. We have reviewed your chart and based on our findings; we are making the following recommendations to better manage your health.     You are in particular need of attention regarding the following:     Asthma Control Test     This screening tool helps us to assess how well your asthma is controlled.Good asthma control leads to fewer asthma symptoms and greater health. If your asthma is not in good control (score is 19 or less) or you have been to the ER or urgent care for your asthma, it is recommended you be seen by your provider for medication and lifestyle adjustments.      Please complete and return the attached Asthma Control Test respond below with you answers for each question: Adult ACT     This is valuable information that is requested by your Care Team.    HYPERTENSION FOLLOW UP: Office Visit  Schedule Annual MAMMOGRAPHY. [facility] number is [phone] or schedule in [employer] (self referral).  Call or [employer] message your clinic to schedule a colonoscopy, schedule/ a FIT Test, or order a Cologuard test. If you are unsure what type of test you need, please call your clinic and speak to clinic staff.   Colon cancer is now the second leading cause of cancer-related deaths in the [place] for both men and women and there are over 130,000 new cases and 50,000 deaths per year from colon cancer. Colonoscopies can prevent 90-95% of these deaths. Problem lesions can be removed before they ever become cancer. This test is not only looking for cancer, but also getting rid of precancerous lesions.   If you are under/uninsured, we recommend you contact the [employer] Program.[employer] is a free colorectal cancer screening program that provides colonoscopies for eligible under/uninsured [place] men and women. If you are interested in  receiving a free colonoscopy, please call [employer] at t [phone] (mention code [employer]opesWeb) to see if you're eligible. Please have them send us the results.   Please schedule a Nurse Only Appointment with your primary care clinic to update your immunizations that are due.  PREVENTATIVE VISIT: Annual Medicare Wellness:Schedule an Annual Medicare Wellness Exam. Please call your [facility] clinic to set up your appointment.    If you have already completed these items, please contact the clinic via phone or   [employer] so your care team can review and update your records. Thank you for   choosing [facility] for your healthcare needs. For any questions,   concerns, or to schedule an appointment please contact our clinic.    Healthy Regards,      Your [facility] Care Team

## 2023-07-17 NOTE — TELEPHONE ENCOUNTER
Patient Quality Outreach    Patient is due for the following:   Asthma  -  ACT needed, Asthma follow-up visit and AAP  Hypertension -  Hypertension follow-up visit  Colon Cancer Screening  Breast Cancer Screening - Mammogram  Physical Annual Wellness Visit      Topic Date Due     COVID-19 Vaccine (1) Never done     Zoster (Shingles) Vaccine (1 of 2) Never done     Pneumococcal Vaccine (1 - PCV) Never done       Next Steps:   Schedule a office visit for medication check Annual Wellness Visit    Type of outreach:    Sent [employer] message. and copy of ACT      Questions for provider review:    None           Regina Madrid, CMA

## 2023-08-22 NOTE — TELEPHONE ENCOUNTER
Patient Quality Outreach    Patient is due for the following:   Asthma  -  ACT needed, Asthma follow-up visit, and AAP  Hypertension -  Hypertension follow-up visit  Colon Cancer Screening  Breast Cancer Screening - Mammogram  Physical Annual Wellness Visit      Topic Date Due    COVID-19 Vaccine (1) Never done    Zoster (Shingles) Vaccine (1 of 2) Never done    Pneumococcal Vaccine (1 - PCV) Never done       Next Steps:   Schedule a Annual Wellness Visit and medication check    Type of outreach:    Sent letter.    Next Steps:  Reach out within 90 days via Letter.    Max number of attempts reached: Yes. Will try again in 90 days if patient still on fail list.    Questions for provider review:    None           Regina Madrid CMA  Chart routed to Care Team.

## 2023-10-05 DIAGNOSIS — M25.511 ACUTE PAIN OF RIGHT SHOULDER: ICD-10-CM

## 2023-10-06 RX ORDER — PSEUDOEPHED/ACETAMINOPH/DIPHEN 30MG-500MG
TABLET ORAL
Qty: 100 TABLET | Refills: 1 | Status: SHIPPED | OUTPATIENT
Start: 2023-10-06

## 2023-11-01 DIAGNOSIS — M17.9 OSTEOARTHRITIS OF KNEE, UNSPECIFIED LATERALITY, UNSPECIFIED OSTEOARTHRITIS TYPE: ICD-10-CM

## 2023-11-01 RX ORDER — DICLOFENAC SODIUM 75 MG/1
TABLET, DELAYED RELEASE ORAL
Qty: 60 TABLET | Refills: 1 | Status: SHIPPED | OUTPATIENT
Start: 2023-11-01

## 2023-11-16 ENCOUNTER — PATIENT OUTREACH (OUTPATIENT)
Dept: GERIATRIC MEDICINE | Facility: CLINIC | Age: 68
End: 2023-11-16
Payer: COMMERCIAL

## 2023-11-16 NOTE — PROGRESS NOTES
[facility] Care Coordination Contact    Attempted to reach client but phone line is disconnected. Reached out to daughter Talia who shared member had permanently moved to [place] and has no intention of returning to the [place].  Member is officially unable to contact effective today.  Completed MMIS entry.  Completed health plan required [facility] POC.    Follow-up Plan: CC will attempt to reach member in six months.    This CC note routed to PCP, Drea Denise

## 2023-11-16 NOTE — LETTER
November 17, 2023    TORI LOGAN  [address]   [place] [address]    Dear Tori:     I m your care coordinator. I ve been unable to reach you by phone. I am writing to ask you or your authorized representative to call me at [phone]. If you reach my voicemail, leave a message with your daytime phone number. Include a date and time that I can call you. If you are hearing impaired, call the [place] Relay at 599 or [phone] (dxieif-rz-clraqu relay service).    The reason I am trying to reach you is:     [x] To schedule an assessment  [] For your six (6)-month check-in  [] Other:      Please call me as soon as you receive this letter. I look forward to speaking with you.    Sincerely,    Ruth Long RN, PHN    E-mail: [email or web address]  Phone: [phone]      [employer]        [id number] accepted  [id number]                                                                        B  (08/2022)

## 2023-11-17 NOTE — PROGRESS NOTES
"[facility] Care Coordination Contact    Per CC, mailed client an \"Unable to Contact\" letter.    Anjelica Levy  Care Management Specialist  [facility]  [phone]     "

## 2023-12-31 DIAGNOSIS — E87.6 HYPOKALEMIA: ICD-10-CM

## 2024-01-02 RX ORDER — POTASSIUM CHLORIDE 750 MG/1
TABLET, EXTENDED RELEASE ORAL
Qty: 30 TABLET | Refills: 0 | Status: SHIPPED | OUTPATIENT
Start: 2024-01-02

## 2024-04-21 ENCOUNTER — HEALTH MAINTENANCE LETTER (OUTPATIENT)
Age: 69
End: 2024-04-21

## 2024-06-17 PROBLEM — Z76.89 HEALTH CARE HOME: Status: RESOLVED | Noted: 2020-02-13 | Resolved: 2024-06-17

## 2024-06-30 ENCOUNTER — HEALTH MAINTENANCE LETTER (OUTPATIENT)
Age: 69
End: 2024-06-30

## 2024-09-23 ENCOUNTER — PATIENT OUTREACH (OUTPATIENT)
Dept: GERIATRIC MEDICINE | Facility: CLINIC | Age: 69
End: 2024-09-23
Payer: COMMERCIAL

## 2024-09-23 NOTE — PROGRESS NOTES
[facility] Care Coordination Contact    No longer active with [facility] case management effective 01/31/24. Health plan ended 4/30/24.   Reason for community disenrollment: SALLY Long RN BSN PHN  [facility] Care Coordinator  [phone]  Fax:[phone]

## 2025-01-17 NOTE — PATIENT INSTRUCTIONS
If the Tretinoin cream needs a prior authorization Corry needs to notify the clinic for the next steps.   
None

## 2025-07-13 ENCOUNTER — HEALTH MAINTENANCE LETTER (OUTPATIENT)
Age: 70
End: 2025-07-13